# Patient Record
Sex: MALE | Race: WHITE | NOT HISPANIC OR LATINO | Employment: OTHER | URBAN - NONMETROPOLITAN AREA
[De-identification: names, ages, dates, MRNs, and addresses within clinical notes are randomized per-mention and may not be internally consistent; named-entity substitution may affect disease eponyms.]

---

## 2017-06-27 ENCOUNTER — AMBULATORY - GICH (OUTPATIENT)
Dept: SCHEDULING | Facility: OTHER | Age: 77
End: 2017-06-27

## 2017-12-14 ENCOUNTER — AMBULATORY - GICH (OUTPATIENT)
Dept: SCHEDULING | Facility: OTHER | Age: 77
End: 2017-12-14

## 2018-01-30 ENCOUNTER — DOCUMENTATION ONLY (OUTPATIENT)
Dept: FAMILY MEDICINE | Facility: OTHER | Age: 78
End: 2018-01-30

## 2018-01-30 RX ORDER — ASPIRIN 81 MG/1
81 TABLET, CHEWABLE ORAL DAILY
COMMUNITY
End: 2022-01-01

## 2018-01-30 RX ORDER — LOSARTAN POTASSIUM 100 MG/1
1 TABLET ORAL DAILY
COMMUNITY
Start: 2012-11-28 | End: 2020-03-30

## 2018-01-30 RX ORDER — INSULIN GLARGINE 100 [IU]/ML
45 INJECTION, SOLUTION SUBCUTANEOUS
COMMUNITY
Start: 2015-11-25 | End: 2020-06-04

## 2018-01-30 RX ORDER — SIMVASTATIN 20 MG
1 TABLET ORAL
COMMUNITY
Start: 2015-12-09 | End: 2020-07-30 | Stop reason: DRUGHIGH

## 2018-01-30 RX ORDER — GLIPIZIDE 10 MG/1
1 TABLET ORAL
COMMUNITY
Start: 2016-02-01 | End: 2020-03-30

## 2018-01-30 RX ORDER — DIPHENOXYLATE HYDROCHLORIDE AND ATROPINE SULFATE 2.5; .025 MG/1; MG/1
1 TABLET ORAL DAILY
COMMUNITY
Start: 2015-11-04 | End: 2021-01-01

## 2018-01-30 RX ORDER — ATENOLOL 50 MG/1
1 TABLET ORAL 2 TIMES DAILY
COMMUNITY
Start: 2012-11-28 | End: 2018-11-28

## 2018-01-30 RX ORDER — BENZONATATE 100 MG/1
1 CAPSULE ORAL 3 TIMES DAILY PRN
COMMUNITY
Start: 2016-06-14 | End: 2018-11-28

## 2018-08-19 ENCOUNTER — APPOINTMENT (OUTPATIENT)
Dept: CT IMAGING | Facility: OTHER | Age: 78
End: 2018-08-19
Attending: EMERGENCY MEDICINE
Payer: COMMERCIAL

## 2018-08-19 ENCOUNTER — HOSPITAL ENCOUNTER (EMERGENCY)
Facility: OTHER | Age: 78
Discharge: HOME | End: 2018-08-19
Attending: EMERGENCY MEDICINE | Admitting: EMERGENCY MEDICINE
Payer: COMMERCIAL

## 2018-08-19 VITALS
OXYGEN SATURATION: 98 % | DIASTOLIC BLOOD PRESSURE: 85 MMHG | RESPIRATION RATE: 16 BRPM | TEMPERATURE: 97.4 F | SYSTOLIC BLOOD PRESSURE: 153 MMHG

## 2018-08-19 DIAGNOSIS — R42 VERTIGO: ICD-10-CM

## 2018-08-19 DIAGNOSIS — R73.9 HYPERGLYCEMIA: ICD-10-CM

## 2018-08-19 LAB
ALBUMIN SERPL-MCNC: 3.9 G/DL (ref 3.5–5.7)
ALBUMIN UR-MCNC: NEGATIVE MG/DL
ALP SERPL-CCNC: 51 U/L (ref 34–104)
ALT SERPL W P-5'-P-CCNC: 8 U/L (ref 7–52)
ANION GAP SERPL CALCULATED.3IONS-SCNC: 6 MMOL/L (ref 3–14)
APPEARANCE UR: CLEAR
AST SERPL W P-5'-P-CCNC: 12 U/L (ref 13–39)
BASOPHILS # BLD AUTO: 0 10E9/L (ref 0–0.2)
BASOPHILS NFR BLD AUTO: 0.6 %
BILIRUB SERPL-MCNC: 0.6 MG/DL (ref 0.3–1)
BILIRUB UR QL STRIP: NEGATIVE
BUN SERPL-MCNC: 12 MG/DL (ref 7–25)
CALCIUM SERPL-MCNC: 9.1 MG/DL (ref 8.6–10.3)
CHLORIDE SERPL-SCNC: 101 MMOL/L (ref 98–107)
CO2 SERPL-SCNC: 27 MMOL/L (ref 21–31)
COLOR UR AUTO: YELLOW
CREAT SERPL-MCNC: 0.86 MG/DL (ref 0.7–1.3)
DIFFERENTIAL METHOD BLD: ABNORMAL
EOSINOPHIL # BLD AUTO: 0.3 10E9/L (ref 0–0.7)
EOSINOPHIL NFR BLD AUTO: 5.7 %
ERYTHROCYTE [DISTWIDTH] IN BLOOD BY AUTOMATED COUNT: 13.2 % (ref 10–15)
GFR SERPL CREATININE-BSD FRML MDRD: 86 ML/MIN/1.7M2
GLUCOSE SERPL-MCNC: 246 MG/DL (ref 70–105)
GLUCOSE UR STRIP-MCNC: >1000 MG/DL
HCT VFR BLD AUTO: 39.6 % (ref 40–53)
HGB BLD-MCNC: 13.8 G/DL (ref 13.3–17.7)
HGB UR QL STRIP: NEGATIVE
IMM GRANULOCYTES # BLD: 0 10E9/L (ref 0–0.4)
IMM GRANULOCYTES NFR BLD: 0.2 %
KETONES UR STRIP-MCNC: NEGATIVE MG/DL
LEUKOCYTE ESTERASE UR QL STRIP: NEGATIVE
LYMPHOCYTES # BLD AUTO: 1.4 10E9/L (ref 0.8–5.3)
LYMPHOCYTES NFR BLD AUTO: 26.2 %
MAGNESIUM SERPL-MCNC: 1.9 MG/DL (ref 1.9–2.7)
MCH RBC QN AUTO: 31.5 PG (ref 26.5–33)
MCHC RBC AUTO-ENTMCNC: 34.8 G/DL (ref 31.5–36.5)
MCV RBC AUTO: 90 FL (ref 78–100)
MONOCYTES # BLD AUTO: 0.4 10E9/L (ref 0–1.3)
MONOCYTES NFR BLD AUTO: 7.6 %
NEUTROPHILS # BLD AUTO: 3.1 10E9/L (ref 1.6–8.3)
NEUTROPHILS NFR BLD AUTO: 59.7 %
NITRATE UR QL: NEGATIVE
PH UR STRIP: 6 PH (ref 5–9)
PLATELET # BLD AUTO: 164 10E9/L (ref 150–450)
POTASSIUM SERPL-SCNC: 4.2 MMOL/L (ref 3.5–5.1)
PROT SERPL-MCNC: 6.2 G/DL (ref 6.4–8.9)
RBC # BLD AUTO: 4.38 10E12/L (ref 4.4–5.9)
RBC #/AREA URNS AUTO: NORMAL /HPF
SODIUM SERPL-SCNC: 134 MMOL/L (ref 134–144)
SOURCE: ABNORMAL
SP GR UR STRIP: 1.01 (ref 1–1.03)
UROBILINOGEN UR STRIP-ACNC: 0.2 EU/DL (ref 0.2–1)
WBC # BLD AUTO: 5.2 10E9/L (ref 4–11)
WBC #/AREA URNS AUTO: NORMAL /HPF

## 2018-08-19 PROCEDURE — 70450 CT HEAD/BRAIN W/O DYE: CPT

## 2018-08-19 PROCEDURE — 85025 COMPLETE CBC W/AUTO DIFF WBC: CPT | Performed by: EMERGENCY MEDICINE

## 2018-08-19 PROCEDURE — 96361 HYDRATE IV INFUSION ADD-ON: CPT | Performed by: EMERGENCY MEDICINE

## 2018-08-19 PROCEDURE — 81001 URINALYSIS AUTO W/SCOPE: CPT | Performed by: EMERGENCY MEDICINE

## 2018-08-19 PROCEDURE — 80053 COMPREHEN METABOLIC PANEL: CPT | Performed by: EMERGENCY MEDICINE

## 2018-08-19 PROCEDURE — 99284 EMERGENCY DEPT VISIT MOD MDM: CPT | Mod: Z6 | Performed by: EMERGENCY MEDICINE

## 2018-08-19 PROCEDURE — 96360 HYDRATION IV INFUSION INIT: CPT | Performed by: EMERGENCY MEDICINE

## 2018-08-19 PROCEDURE — 93010 ELECTROCARDIOGRAM REPORT: CPT | Performed by: INTERNAL MEDICINE

## 2018-08-19 PROCEDURE — 93005 ELECTROCARDIOGRAM TRACING: CPT | Performed by: EMERGENCY MEDICINE

## 2018-08-19 PROCEDURE — 83735 ASSAY OF MAGNESIUM: CPT | Performed by: EMERGENCY MEDICINE

## 2018-08-19 PROCEDURE — 36415 COLL VENOUS BLD VENIPUNCTURE: CPT | Performed by: EMERGENCY MEDICINE

## 2018-08-19 PROCEDURE — 99285 EMERGENCY DEPT VISIT HI MDM: CPT | Mod: 25 | Performed by: EMERGENCY MEDICINE

## 2018-08-19 PROCEDURE — 25000128 H RX IP 250 OP 636: Performed by: EMERGENCY MEDICINE

## 2018-08-19 RX ORDER — SODIUM CHLORIDE 9 MG/ML
100 INJECTION, SOLUTION INTRAVENOUS CONTINUOUS
Status: DISCONTINUED | OUTPATIENT
Start: 2018-08-19 | End: 2018-08-19 | Stop reason: HOSPADM

## 2018-08-19 RX ADMIN — SODIUM CHLORIDE 100 ML: 900 INJECTION, SOLUTION INTRAVENOUS at 12:53

## 2018-08-19 ASSESSMENT — ENCOUNTER SYMPTOMS
HEADACHES: 0
CONSTIPATION: 0
NAUSEA: 0
DYSURIA: 0
DIZZINESS: 1
SHORTNESS OF BREATH: 0
FACIAL ASYMMETRY: 0
ARTHRALGIAS: 0
SPEECH DIFFICULTY: 0
AGITATION: 0
SEIZURES: 0
DIARRHEA: 0
NUMBNESS: 0
CHILLS: 0
VOMITING: 0
TREMORS: 0
LIGHT-HEADEDNESS: 1
WEAKNESS: 0
CHEST TIGHTNESS: 0
FEVER: 0

## 2018-08-19 NOTE — ED PROVIDER NOTES
History     Chief Complaint   Patient presents with     Hyperglycemia     The history is provided by the patient.     Vikas Ji is a 78 year old male [place].  He said he felt fine this morning.  They went out to [employer] and he had some pancakes and syrup prior to going to the fair.  He took his oral diabetes medications and his insulin this morning about 6 AM.  When he was walking around at the fair perhaps at 11 AM or so he suddenly felt dizzy and like he was listing to the left.  He said he felt lites.  His chest felt light also.  He could hardly stand and had to sit.  He felt like the ground was tipping to the left like he was walking on the side of her roof.  Paramedics came to evaluate him got him in the ambulance took his vitals and his symptoms seem to have resolved at this time.  On seen the paramedics reported that he had a negative stroke scale.  His blood sugar however was elevated in the 270 range.  He has not been ill recently.    Problem List:    Patient Active Problem List    Diagnosis Date Noted     Dietary B12 deficiency 11/04/2015     Priority: Medium     Balance problems 10/06/2015     Priority: Medium     Generalized weakness 10/06/2015     Priority: Medium     Diabetes mellitus type 2, uncontrolled (H) 09/10/2015     Priority: Medium     History of tobacco use 09/10/2015     Priority: Medium     Low blood magnesium level 09/10/2015     Priority: Medium     Cough 11/28/2012     Priority: Medium     Diabetes mellitus without complication (H) 04/19/2011     Priority: Medium     Hypercholesterolemia 04/19/2011     Priority: Medium     Hypertension 04/19/2011     Priority: Medium     Inguinal hernia, right 04/19/2011     Priority: Medium     Diverticulosis of large intestine 10/18/2010     Priority: Medium     Personal history of other diseases of digestive system 10/18/2010     Priority: Medium        Past Medical History:    Past Medical History:    Diagnosis Date     Essential (primary) hypertension      Hypomagnesemia      Other abnormalities of gait and mobility      Pure hypercholesterolemia      Type 2 diabetes mellitus with hyperglycemia (H)        Past Surgical History:    Past Surgical History:   Procedure Laterality Date     COLONOSCOPY      2010,F/U 2020  Ulcerative colitis       Family History:    No family history on file.    Social History:  Marital Status:   [2]  Social History   Substance Use Topics     Smoking status: Former Smoker     Quit date: 1/1/1987     Smokeless tobacco: Current User     Types: Snuff     Alcohol use 3.6 oz/week      Comment: occasional        Medications:      aspirin 81 MG chewable tablet   atenolol (TENORMIN) 50 MG tablet   benzonatate (TESSALON) 100 MG capsule   calcium & magnesium carbonates 311-232 MG TABS   cyanocobalamin (RA VITAMIN B-12 TR) 1000 MCG TBCR   glipiZIDE (GLUCOTROL) 10 MG tablet   insulin glargine (LANTUS) 100 UNIT/ML injection   losartan (COZAAR) 100 MG tablet   metFORMIN (GLUCOPHAGE) 1000 MG tablet   Multiple Vitamin (MULTI-VITAMINS) TABS   omeprazole (PRILOSEC) 20 MG CR capsule   simvastatin (ZOCOR) 20 MG tablet   [DISCONTINUED] metFORMIN (GLUCOPHAGE) 1000 MG tablet         Review of Systems   Constitutional: Negative for chills and fever.   HENT: Negative for congestion.    Eyes: Negative for visual disturbance.   Respiratory: Negative for chest tightness and shortness of breath.    Cardiovascular: Negative for chest pain.   Gastrointestinal: Negative for constipation, diarrhea, nausea and vomiting.   Genitourinary: Negative for dysuria.   Musculoskeletal: Negative for arthralgias.   Skin: Negative for rash.   Neurological: Positive for dizziness and light-headedness. Negative for tremors, seizures, syncope, facial asymmetry, speech difficulty, weakness, numbness and headaches.   Psychiatric/Behavioral: Negative for agitation.       Physical Exam   BP: 148/75  Heart Rate: 60  Temp: 97.4  F  (36.3  C)  Resp: 16  SpO2: 98 %  Lying Orthostatic BP: 144/70  Lying Orthostatic Pulse: 59 bpm  Sitting Orthostatic BP: 134/70  Sitting Orthostatic Pulse: 60 bpm  Standing Orthostatic BP: 144/70  Standing Orthostatic Pulse: 62 bpm      Physical Exam   Constitutional: He is oriented to person, place, and time. He appears well-developed and well-nourished. No distress.   HENT:   Head: Normocephalic and atraumatic.   Left tympanic membrane is clear, right tympanic membrane has cerumen quite deep in the canal could be resting on tympanic membrane.  Cannot visualize tympanic membrane appear   Eyes: Conjunctivae are normal.   Neck: Neck supple.   Cardiovascular: Normal rate, regular rhythm and normal heart sounds.    Pulmonary/Chest: Effort normal and breath sounds normal. No respiratory distress.   Abdominal: Soft. Bowel sounds are normal.   Neurological: He is alert and oriented to person, place, and time. He has normal strength. He displays a negative Romberg sign. GCS eye subscore is 4. GCS verbal subscore is 5. GCS motor subscore is 6.   Cranial nerves II through XII intact.   Skin: Skin is warm and dry. He is not diaphoretic.   Psychiatric: He has a normal mood and affect. His behavior is normal.   Nursing note and vitals reviewed.      ED Course     ED Course     Procedures               EKG Interpretation:      Interpreted by Jeferson Mcgrath  Time reviewed: 1250  Symptoms at time of EKG: dizziness   Rhythm: normal sinus   Rate: 50-60  Axis: Normal  Ectopy: none  Conduction: normal  ST Segments/ T Waves: No ST-T wave changes  Q Waves: none  Comparison to prior: Unchanged    Clinical Impression: sinus bradycardia                     Results for orders placed or performed during the hospital encounter of 08/19/18 (from the past 24 hour(s))   CBC with platelets differential   Result Value Ref Range    WBC 5.2 4.0 - 11.0 10e9/L    RBC Count 4.38 (L) 4.4 - 5.9 10e12/L    Hemoglobin 13.8 13.3 - 17.7 g/dL    Hematocrit 39.6  (L) 40.0 - 53.0 %    MCV 90 78 - 100 fl    MCH 31.5 26.5 - 33.0 pg    MCHC 34.8 31.5 - 36.5 g/dL    RDW 13.2 10.0 - 15.0 %    Platelet Count 164 150 - 450 10e9/L    Diff Method Automated Method     % Neutrophils 59.7 %    % Lymphocytes 26.2 %    % Monocytes 7.6 %    % Eosinophils 5.7 %    % Basophils 0.6 %    % Immature Granulocytes 0.2 %    Absolute Neutrophil 3.1 1.6 - 8.3 10e9/L    Absolute Lymphocytes 1.4 0.8 - 5.3 10e9/L    Absolute Monocytes 0.4 0.0 - 1.3 10e9/L    Absolute Eosinophils 0.3 0.0 - 0.7 10e9/L    Absolute Basophils 0.0 0.0 - 0.2 10e9/L    Abs Immature Granulocytes 0.0 0 - 0.4 10e9/L   Comprehensive metabolic panel   Result Value Ref Range    Sodium 134 134 - 144 mmol/L    Potassium 4.2 3.5 - 5.1 mmol/L    Chloride 101 98 - 107 mmol/L    Carbon Dioxide 27 21 - 31 mmol/L    Anion Gap 6 3 - 14 mmol/L    Glucose 246 (H) 70 - 105 mg/dL    Urea Nitrogen 12 7 - 25 mg/dL    Creatinine 0.86 0.70 - 1.30 mg/dL    GFR Estimate 86 >60 mL/min/1.7m2    GFR Estimate If Black >90 >60 mL/min/1.7m2    Calcium 9.1 8.6 - 10.3 mg/dL    Bilirubin Total 0.6 0.3 - 1.0 mg/dL    Albumin 3.9 3.5 - 5.7 g/dL    Protein Total 6.2 (L) 6.4 - 8.9 g/dL    Alkaline Phosphatase 51 34 - 104 U/L    ALT 8 7 - 52 U/L    AST 12 (L) 13 - 39 U/L   Magnesium   Result Value Ref Range    Magnesium 1.9 1.9 - 2.7 mg/dL   *UA reflex to Microscopic   Result Value Ref Range    Color Urine Yellow     Appearance Urine Clear     Glucose Urine >1000 (A) NEG^Negative mg/dL    Bilirubin Urine Negative NEG^Negative    Ketones Urine Negative NEG^Negative mg/dL    Specific Gravity Urine 1.010 1.000 - 1.030    Blood Urine Negative NEG^Negative    pH Urine 6.0 5.0 - 9.0 pH    Protein Albumin Urine Negative NEG^Negative mg/dL    Urobilinogen Urine 0.2 0.2 - 1.0 EU/dL    Nitrite Urine Negative NEG^Negative    Leukocyte Esterase Urine Negative NEG^Negative    Source Midstream Urine    Urine Microscopic   Result Value Ref Range    WBC Urine 0 - 5 OTO5^0 - 5 /HPF     RBC Urine O - 2 OTO2^O - 2 /HPF   CT Head w/o Contrast    Narrative    PROCEDURE: CT HEAD W/O CONTRAST     HISTORY: vertigo; .    COMPARISON: None.    TECHNIQUE:  Helical images of the head from the foramen magnum to the  vertex were obtained without contrast.    FINDINGS: There is some widening of the cortical sulci consistent with  atrophy. There are no masses ventricular shifts or extracerebral  collections. The brainstem and cerebellum appear normal. No abnormal  fluid is seen in the middle ear cavity or mastoids.  Moderate mucosal  thickening is seen in the sphenoid sinuses.      Impression    IMPRESSION: Sphenoid sinusitis. Atrophy of the brain is noted.      NEDA TRENT MD       Medications   sodium chloride 0.9% infusion (0 mLs Intravenous Stopped 8/19/18 5153)       Assessments & Plan (with Medical Decision Making)     I have reviewed the nursing notes.    I have reviewed the findings, diagnosis, plan and need for follow up with the patient.  Patient symptoms have resolved.  With the exception of hyperglycemia which is worse than is normal for him, evaluation is unremarkable.  He does have some new hearing aids and is wondering if perhaps those could have caused this.  He also cleans his ears with a Q-tip and on his right ear he does have some cerumen which appears to be resting against his tympanic membrane and this could potentially be causing some symptoms, however I really have no definitive answer for why he was having his vertigo type symptoms.  He will be discharged home at this time, return if worse or follow-up in clinic if continues to have symptoms.    New Prescriptions    No medications on file       Final diagnoses:   Vertigo   Hyperglycemia       8/19/2018   [facility]     Jeferson Mcgrath MD  08/19/18 3304       Jeferson Mcgrath MD  08/19/18 3746

## 2018-08-19 NOTE — ED AVS SNAPSHOT
[facility]    [address]    [place] [address]    Phone:  [phone]    Fax:  [phone]                                       Vikas Ji   MRN: [id number]    Department:  [facility]   Date of Visit:  8/19/2018           After Visit Summary Signature Page     I have received my discharge instructions, and my questions have been answered. I have discussed any challenges I see with this plan with the nurse or doctor.    ..........................................................................................................................................  Patient/Patient Representative Signature      ..........................................................................................................................................  Patient Representative Print Name and Relationship to Patient    ..................................................               ................................................  Date                                            Time    ..........................................................................................................................................  Reviewed by Signature/Title    ...................................................              ..............................................  Date                                                            Time

## 2018-08-19 NOTE — DISCHARGE INSTRUCTIONS
Possible Causes of Dizziness or Fainting    Dizziness and fainting can have many causes. Below are some examples of possible causes your healthcare provider will look to rule out.  Benign paroxysmal positional vertigo (BPPV)  BPPV results when calcium crystals inside the inner ear shift into the wrong position. BPPV causes episodes of vertigo (a spinning sensation). Episodes most often happen when the head is moved in a certain way. This is more common in people 65 and older.   Infection or inflammation  The semicircular canals of the ear may become infected or inflamed. In this case, they can send the wrong balance signals. This can cause vertigo.  Meniere disease  Meniere disease happens when there is too much fluid in the semicircular canals. This can cause vertigo. It also can cause hearing problems and buzzing or ringing in the ears (called tinnitus). You may also have a feeling of pressure or fullness in the ear.  Syncope  Syncope is fainting that happens when the brain doesn t get enough oxygen-rich blood. It can be caused by low heart rate or low blood pressure. This is called vasovagal syncope. It can also be caused by sitting or standing up too quickly. This is called orthostatic hypotension. Syncope may also be due to a heart valve problem, an abnormal heart rhythm, or other heart problems. Dizziness can also happen from stroke, hemorrhage in the brain, or other problems in the brain. Your healthcare provider may do certain tests to rule out these conditions.  Other causes  Other causes include:    Medicines. Certain medicines can cause dizziness and even fainting. In some cases, stopping a medicine too quickly can lead to withdrawal symptoms, including dizziness and fainting.    Anxiety. Being anxious can lead to breathing changes, such as hyperventilation. These can lead to dizziness and fainting.  Additional causes for dizziness and fainting also exist. Talk to your healthcare provider for more  information.     Date Last Reviewed: 10/6/2015    2303-6202 [employer]. [address] [place] [address], [place] [address]. All rights reserved. This information is not intended as a substitute for professional medical care. Always follow your healthcare professional's instructions.

## 2018-08-19 NOTE — ED AVS SNAPSHOT
[facility]    [address]    [place] [address]    Phone:  [phone]    Fax:  [phone]                                       Vikas Ji   MRN: [id number]    Department:  [facility]   Date of Visit:  8/19/2018           Patient Information     Date Of Birth          1940        Your diagnoses for this visit were:     Vertigo        You were seen by Jeferson Mcgrath MD.        Discharge Instructions         Possible Causes of Dizziness or Fainting    Dizziness and fainting can have many causes. Below are some examples of possible causes your healthcare provider will look to rule out.  Benign paroxysmal positional vertigo (BPPV)  BPPV results when calcium crystals inside the inner ear shift into the wrong position. BPPV causes episodes of vertigo (a spinning sensation). Episodes most often happen when the head is moved in a certain way. This is more common in people 65 and older.   Infection or inflammation  The semicircular canals of the ear may become infected or inflamed. In this case, they can send the wrong balance signals. This can cause vertigo.  Meniere disease  Meniere disease happens when there is too much fluid in the semicircular canals. This can cause vertigo. It also can cause hearing problems and buzzing or ringing in the ears (called tinnitus). You may also have a feeling of pressure or fullness in the ear.  Syncope  Syncope is fainting that happens when the brain doesn t get enough oxygen-rich blood. It can be caused by low heart rate or low blood pressure. This is called vasovagal syncope. It can also be caused by sitting or standing up too quickly. This is called orthostatic hypotension. Syncope may also be due to a heart valve problem, an abnormal heart rhythm, or other heart problems. Dizziness can also happen from stroke, hemorrhage in the brain, or other problems in the brain. Your healthcare provider may do certain tests  to rule out these conditions.  Other causes  Other causes include:    Medicines. Certain medicines can cause dizziness and even fainting. In some cases, stopping a medicine too quickly can lead to withdrawal symptoms, including dizziness and fainting.    Anxiety. Being anxious can lead to breathing changes, such as hyperventilation. These can lead to dizziness and fainting.  Additional causes for dizziness and fainting also exist. Talk to your healthcare provider for more information.     Date Last Reviewed: 10/6/2015    0830-3278 [employer]. [address] [place] [address], [place] [address]. All rights reserved. This information is not intended as a substitute for professional medical care. Always follow your healthcare professional's instructions.          24 Hour Appointment Hotline     To schedule an appointment at [facility], please call [phone]. If you don't have a family doctor or clinic, we will help you find one. [facility] clinics are conveniently located to serve the needs of you and your family.           Review of your medicines      Our records show that you are taking the medicines listed below. If these are incorrect, please call your family doctor or clinic.        Dose / Directions Last dose taken    aspirin 81 MG chewable tablet   Dose:  81 mg        Take 81 mg by mouth once   Refills:  0        atenolol 50 MG tablet   Commonly known as:  TENORMIN   Dose:  1 tablet        Take 1 tablet by mouth 2 times daily   Refills:  0        benzonatate 100 MG capsule   Commonly known as:  TESSALON   Dose:  1 capsule        Take 1 capsule by mouth 3 times daily as needed for cough   Refills:  0        calcium & magnesium carbonates 311-232 MG Tabs   Dose:  3 tablet        Take 3 tablets by mouth daily   Refills:  0        glipiZIDE 10 MG tablet   Commonly known as:  GLUCOTROL   Dose:  1 tablet        Take 1 tablet by mouth 2 times daily (before meals) In AM and PM -- and add 1/2 tablet daily with  Lunch.   Refills:  0        insulin glargine 100 UNIT/ML injection   Commonly known as:  LANTUS   Dose:  45 Units        Inject 45 Units Subcutaneous every morning (before breakfast)   Refills:  0        losartan 100 MG tablet   Commonly known as:  COZAAR   Dose:  1 tablet        Take 1 tablet by mouth daily   Refills:  0        metFORMIN 1000 MG tablet   Commonly known as:  GLUCOPHAGE   Dose:  1 tablet        Take 1 tablet by mouth 2 times daily (with meals)   Refills:  0        MULTI-VITAMINS Tabs   Dose:  2 tablet        Take 2 tablets by mouth daily   Refills:  0        omeprazole 20 MG CR capsule   Commonly known as:  priLOSEC   Dose:  1 capsule        Take 1 capsule by mouth daily   Refills:  0        RA VITAMIN B-12 TR 1000 MCG Tbcr   Dose:  1 tablet   Generic drug:  cyanocobalamin        Take 1 tablet by mouth daily   Refills:  0        simvastatin 20 MG tablet   Commonly known as:  ZOCOR   Dose:  1 tablet        Take 1 tablet by mouth daily (with dinner) Patient started taking 20 mg 11/25/15.   Refills:  0                Procedures and tests performed during your visit     *UA reflex to Microscopic    CBC with platelets differential    CT Head w/o Contrast    Comprehensive metabolic panel    EKG 12-lead, tracing only    Magnesium    Urine Microscopic      Orders Needing Specimen Collection     None      Pending Results     No orders found from 8/17/2018 to 8/20/2018.            Pending Culture Results     No orders found from 8/17/2018 to 8/20/2018.            Pending Results Instructions     If you had any lab results that were not finalized at the time of your Discharge, you can call the ED Lab Result RN at [phone]. You will be contacted by this team for any positive Lab results or changes in treatment. The nurses are available 7 days a week from 10A to 6:30P.  You can leave a message 24 hours per day and they will return your call.        Thank you for choosing [facility]       Thank you for choosing  "[facility] for your care. Our goal is always to provide you with excellent care. Hearing back from our patients is one way we can continue to improve our services. Please take a few minutes to complete the written survey that you may receive in the mail after you visit with us. Thank you!        [employer] Information     [employer] lets you send messages to your doctor, view your test results, renew your prescriptions, schedule appointments and more. To sign up, go to [email or web address] . Click on \"Log in\" on the left side of the screen, which will take you to the Welcome page. Then click on \"Sign up Now\" on the right side of the page.     You will be asked to enter the access code listed below, as well as some personal information. Please follow the directions to create your username and password.     Your access code is: [email or web address]  Expires: 2018  3:00 PM     Your access code will  in 90 days. If you need help or a new code, please call your [facility] clinic or [phone].        Care EveryWhere ID     This is your Care EveryWhere ID. This could be used by other organizations to access your [facility] medical records  [id number]        Equal Access to Services     VASQUEZ CASTRO : Ela Sorto, david salinas, qasimona valdez, taran johnson. So [facility] [phone].    ATENCIÓN: Si habla español, tiene a gonzalez disposición servicios gratuitos de asistencia lingüística. Llame al [phone].    We comply with applicable federal civil rights laws and [place] laws. We do not discriminate on the basis of race, color, national origin, age, disability, sex, sexual orientation, or gender identity.            After Visit Summary       This is your record. Keep this with you and show to your community pharmacist(s) and doctor(s) at your next visit.                  "

## 2018-08-19 NOTE — ED NOTES
"Took pt on a \"road test\" and the pt reports not having any dizziness.  Pt's gait was not impaired.  "

## 2018-08-19 NOTE — ED TRIAGE NOTES
"[place]  Pt had a sudden onset of dizziness and left sided weakness.  Pt states he does have a hx of a CVA.  Pt states that he has new hearing aides and wondering if that may have got him off balance.  Pt states that he was walking and felt like he was leaning to the left and had never felt like this before, almost like he was \"drunk\" the pt states.  Pt states that he doesn't have any dizziness, just a mild headache across his eyes.  "

## 2018-11-28 ENCOUNTER — OFFICE VISIT (OUTPATIENT)
Dept: INTERNAL MEDICINE | Facility: OTHER | Age: 78
End: 2018-11-28
Attending: INTERNAL MEDICINE
Payer: COMMERCIAL

## 2018-11-28 VITALS
HEART RATE: 72 BPM | DIASTOLIC BLOOD PRESSURE: 74 MMHG | SYSTOLIC BLOOD PRESSURE: 138 MMHG | BODY MASS INDEX: 26.6 KG/M2 | WEIGHT: 180.13 LBS

## 2018-11-28 DIAGNOSIS — I10 BENIGN ESSENTIAL HYPERTENSION: Primary | ICD-10-CM

## 2018-11-28 DIAGNOSIS — E11.9 CONTROLLED TYPE 2 DIABETES MELLITUS WITHOUT COMPLICATION, WITH LONG-TERM CURRENT USE OF INSULIN (H): ICD-10-CM

## 2018-11-28 DIAGNOSIS — Z79.4 CONTROLLED TYPE 2 DIABETES MELLITUS WITHOUT COMPLICATION, WITH LONG-TERM CURRENT USE OF INSULIN (H): ICD-10-CM

## 2018-11-28 DIAGNOSIS — E78.2 MIXED HYPERLIPIDEMIA: ICD-10-CM

## 2018-11-28 LAB
ALBUMIN SERPL-MCNC: 4.5 G/DL (ref 3.5–5.7)
ALP SERPL-CCNC: 51 U/L (ref 34–104)
ALT SERPL W P-5'-P-CCNC: 9 U/L (ref 7–52)
ANION GAP SERPL CALCULATED.3IONS-SCNC: 6 MMOL/L (ref 3–14)
AST SERPL W P-5'-P-CCNC: 13 U/L (ref 13–39)
BILIRUB SERPL-MCNC: 0.9 MG/DL (ref 0.3–1)
BUN SERPL-MCNC: 13 MG/DL (ref 7–25)
CALCIUM SERPL-MCNC: 9.4 MG/DL (ref 8.6–10.3)
CHLORIDE SERPL-SCNC: 101 MMOL/L (ref 98–107)
CHOLEST SERPL-MCNC: 113 MG/DL
CO2 SERPL-SCNC: 29 MMOL/L (ref 21–31)
CREAT SERPL-MCNC: 0.79 MG/DL (ref 0.7–1.3)
ERYTHROCYTE [DISTWIDTH] IN BLOOD BY AUTOMATED COUNT: 12.4 % (ref 10–15)
GFR SERPL CREATININE-BSD FRML MDRD: >90 ML/MIN/1.7M2
GLUCOSE SERPL-MCNC: 127 MG/DL (ref 70–105)
HBA1C MFR BLD: 6.7 % (ref 4–6)
HCT VFR BLD AUTO: 41.4 % (ref 40–53)
HDLC SERPL-MCNC: 37 MG/DL (ref 23–92)
HGB BLD-MCNC: 14.6 G/DL (ref 13.3–17.7)
LDLC SERPL CALC-MCNC: 56 MG/DL
MCH RBC QN AUTO: 31.7 PG (ref 26.5–33)
MCHC RBC AUTO-ENTMCNC: 35.3 G/DL (ref 31.5–36.5)
MCV RBC AUTO: 90 FL (ref 78–100)
NONHDLC SERPL-MCNC: 76 MG/DL
PLATELET # BLD AUTO: 172 10E9/L (ref 150–450)
POTASSIUM SERPL-SCNC: 4.4 MMOL/L (ref 3.5–5.1)
PROT SERPL-MCNC: 7 G/DL (ref 6.4–8.9)
RBC # BLD AUTO: 4.6 10E12/L (ref 4.4–5.9)
SODIUM SERPL-SCNC: 136 MMOL/L (ref 134–144)
TRIGL SERPL-MCNC: 102 MG/DL
WBC # BLD AUTO: 7.1 10E9/L (ref 4–11)

## 2018-11-28 PROCEDURE — 80061 LIPID PANEL: CPT | Performed by: INTERNAL MEDICINE

## 2018-11-28 PROCEDURE — 80053 COMPREHEN METABOLIC PANEL: CPT | Performed by: INTERNAL MEDICINE

## 2018-11-28 PROCEDURE — 99214 OFFICE O/P EST MOD 30 MIN: CPT | Performed by: INTERNAL MEDICINE

## 2018-11-28 PROCEDURE — 36415 COLL VENOUS BLD VENIPUNCTURE: CPT | Performed by: INTERNAL MEDICINE

## 2018-11-28 PROCEDURE — 83036 HEMOGLOBIN GLYCOSYLATED A1C: CPT | Performed by: INTERNAL MEDICINE

## 2018-11-28 PROCEDURE — 85027 COMPLETE CBC AUTOMATED: CPT | Performed by: INTERNAL MEDICINE

## 2018-11-28 RX ORDER — ATENOLOL 50 MG/1
50 TABLET ORAL 2 TIMES DAILY
Qty: 180 TABLET | Refills: 3 | COMMUNITY
Start: 2018-11-28 | End: 2019-09-18

## 2018-11-28 ASSESSMENT — PAIN SCALES - GENERAL: PAINLEVEL: NO PAIN (0)

## 2018-11-28 ASSESSMENT — ENCOUNTER SYMPTOMS
CONFUSION: 0
HEMATURIA: 0
ABDOMINAL PAIN: 0
ARTHRALGIAS: 0
BRUISES/BLEEDS EASILY: 0
MYALGIAS: 0
VOMITING: 0
PALPITATIONS: 0
EYE PAIN: 0
SHORTNESS OF BREATH: 0
FEVER: 0
COUGH: 0
DIZZINESS: 0
WHEEZING: 0
CHILLS: 0
AGITATION: 0
FATIGUE: 0
LIGHT-HEADEDNESS: 0
DIARRHEA: 0
DYSURIA: 0
NAUSEA: 0

## 2018-11-28 ASSESSMENT — ANXIETY QUESTIONNAIRES
3. WORRYING TOO MUCH ABOUT DIFFERENT THINGS: NOT AT ALL
7. FEELING AFRAID AS IF SOMETHING AWFUL MIGHT HAPPEN: NOT AT ALL
2. NOT BEING ABLE TO STOP OR CONTROL WORRYING: NOT AT ALL
IF YOU CHECKED OFF ANY PROBLEMS ON THIS QUESTIONNAIRE, HOW DIFFICULT HAVE THESE PROBLEMS MADE IT FOR YOU TO DO YOUR WORK, TAKE CARE OF THINGS AT HOME, OR GET ALONG WITH OTHER PEOPLE: NOT DIFFICULT AT ALL
5. BEING SO RESTLESS THAT IT IS HARD TO SIT STILL: NOT AT ALL
GAD7 TOTAL SCORE: 0
6. BECOMING EASILY ANNOYED OR IRRITABLE: NOT AT ALL
1. FEELING NERVOUS, ANXIOUS, OR ON EDGE: NOT AT ALL

## 2018-11-28 ASSESSMENT — PATIENT HEALTH QUESTIONNAIRE - PHQ9
5. POOR APPETITE OR OVEREATING: NOT AT ALL
SUM OF ALL RESPONSES TO PHQ QUESTIONS 1-9: 0

## 2018-11-28 NOTE — PROGRESS NOTES
"Nursing Notes:   Yesenia Trammell LPN  11/28/2018 10:46 AM  Signed  Patient presents to the clinic for discussion of current medications.      Previous A1C is at goal of <8  No results found for: A1C  Urine microalbumin:creatine: n/a  Foot exam over a year ago-today  Eye exam Spring 2018    Tobacco User no  Patient is on a daily aspirin  Patient is on a Statin.  Blood pressure today of:     BP Readings from Last 1 Encounters:   08/19/18 153/85      is not at the goal of <139/89 for diabetics.    Chief Complaint   Patient presents with     Recheck Medication       Initial There were no vitals taken for this visit. Estimated body mass index is 28.8 kg/(m^2) as calculated from the following:    Height as of 3/22/16: 5' 9\" (1.753 m).    Weight as of 6/14/16: 195 lb (88.5 kg).  Medication Reconciliation: complete    Yesenia Trammell LPN        Nursing note reviewed with patient.  Accurracy and completeness verified.   Mr. Ji is a 78 year old male who:  Patient presents with:  Recheck Medication      ICD-10-CM    1. Benign essential hypertension I10 atenolol (TENORMIN) 50 MG tablet   2. Mixed hyperlipidemia E78.2 Lipid Profile     Lipid Profile   3. Controlled type 2 diabetes mellitus without complication, with long-term current use of insulin (H) E11.9 Albumin Random Urine Quantitative with Creat Ratio    Z79.4 CBC with platelets     Comprehensive metabolic panel     *UA reflex to Microscopic     Hemoglobin A1c     CBC with platelets     Comprehensive metabolic panel     Hemoglobin A1c     HPI  Patient presents with numerous medication questions.    Hypertension, currently well controlled.  Taking atenolol 50 mg twice daily.  [facility] 100 mg twice daily.  He thought that was way too much medication.  He is doing well 50 mg twice daily advised to continue.  Med list updated.  Refill sent to pharmacy.    Hyperlipidemia, currently on simvastatin.  Seems to be tolerating " well.  Check labs.    Diabetes, previously uncontrolled, now controlled.  Labs ordered.  Med list updated.    To help with weight loss and improve blood sugar control....    -- Try to avoid Carbohydrates as much as possible -- breads, pasta, baked goods, cakes, oatmeal, cold cereal, potatoes.   These are turned to sugar in one metabolic conversion, cause insulin secretion and increased fat deposition / weight gain.      -- Eat more lean meats, proteins, eggs, nuts, vegetables.      Functional Capacity: about 4 METS.   No orthopnea/paroxysmal nocturnal dyspnea  Review of Systems   Constitutional: Negative for chills, fatigue and fever.   HENT: Negative for congestion and hearing loss.    Eyes: Negative for pain and visual disturbance.   Respiratory: Negative for cough, shortness of breath and wheezing.    Cardiovascular: Negative for chest pain and palpitations.   Gastrointestinal: Negative for abdominal pain, diarrhea, nausea and vomiting.   Endocrine: Negative for cold intolerance and heat intolerance.   Genitourinary: Negative for dysuria and hematuria.   Musculoskeletal: Negative for arthralgias and myalgias.   Skin: Negative for pallor.   Allergic/Immunologic: Negative for immunocompromised state.   Neurological: Negative for dizziness and light-headedness.   Hematological: Does not bruise/bleed easily.   Psychiatric/Behavioral: Negative for agitation and confusion.      LEYDA:   LEYDA-7 SCORE 11/28/2018   Total Score 0     PHQ9:  PHQ-9 SCORE 9/10/2015 10/6/2015 11/28/2018   PHQ-9 Total Score 4 1 0       I have personally reviewed the past medical history, past surgical history, medications, allergies, family and social history as listed below, on 11/28/2018.    No Known Allergies    Current Outpatient Prescriptions   Medication Sig Dispense Refill     aspirin 81 MG chewable tablet Take 81 mg by mouth once       atenolol (TENORMIN) 50 MG tablet Take 1 tablet (50 mg) by mouth 2 times daily - dose clarification  11/28/2018 180 tablet 3     calcium & magnesium carbonates 311-232 MG TABS Take 3 tablets by mouth daily       cyanocobalamin (RA VITAMIN B-12 TR) 1000 MCG TBCR Take 1 tablet by mouth daily       glipiZIDE (GLUCOTROL) 10 MG tablet Take 1 tablet by mouth 2 times daily (before meals) In AM and PM -- and add 1/2 tablet daily with Lunch.       insulin glargine (LANTUS) 100 UNIT/ML injection Inject 45 Units Subcutaneous every morning (before breakfast)       losartan (COZAAR) 100 MG tablet Take 1 tablet by mouth daily       metFORMIN (GLUCOPHAGE) 1000 MG tablet Take 1 tablet by mouth 2 times daily (with meals)       Multiple Vitamin (MULTI-VITAMINS) TABS Take 2 tablets by mouth daily       omeprazole (PRILOSEC) 20 MG CR capsule Take 1 capsule by mouth daily       simvastatin (ZOCOR) 20 MG tablet Take 1 tablet by mouth daily (with dinner) Patient started taking 20 mg 11/25/15.          Patient Active Problem List    Diagnosis Date Noted     Benign essential hypertension 11/28/2018     Priority: Medium     Mixed hyperlipidemia 11/28/2018     Priority: Medium     Dietary B12 deficiency 11/04/2015     Priority: Medium     Balance problems 10/06/2015     Priority: Medium     Generalized weakness 10/06/2015     Priority: Medium     Controlled type 2 diabetes mellitus without complication, with long-term current use of insulin (H) 09/10/2015     Priority: Medium     History of tobacco use 09/10/2015     Priority: Medium     Low blood magnesium level 09/10/2015     Priority: Medium     Cough 11/28/2012     Priority: Medium     Inguinal hernia, right 04/19/2011     Priority: Medium     Diverticulosis of large intestine 10/18/2010     Priority: Medium     Personal history of other diseases of digestive system 10/18/2010     Priority: Medium     Past Medical History:   Diagnosis Date     Essential (primary) hypertension     4/19/2011     Hypomagnesemia     9/10/2015     Other abnormalities of gait and mobility     10/6/2015      "Pure hypercholesterolemia     4/19/2011     Type 2 diabetes mellitus with hyperglycemia (H)     9/10/2015     Past Surgical History:   Procedure Laterality Date     COLONOSCOPY      2010,F/U 2020  Ulcerative colitis     Social History     Social History     Marital status:      Spouse name: N/A     Number of children: N/A     Years of education: N/A     Social History Main Topics     Smoking status: Former Smoker     Quit date: 1/1/1987     Smokeless tobacco: Current User     Types: Snuff     Alcohol use 3.6 oz/week     Drug use: No     Sexual activity: No     Other Topics Concern     None     Social History Narrative    , lives with his wife, remains active     History reviewed. No pertinent family history.    EXAM:   Vitals:    11/28/18 1041   BP: 138/74   BP Location: Right arm   Patient Position: Sitting   Cuff Size: Adult Regular   Pulse: 72   Weight: 180 lb 2 oz (81.7 kg)       Current Pain Score: No Pain (0)     BP Readings from Last 3 Encounters:   11/28/18 138/74   08/19/18 153/85   06/14/16 (!) 152/92      Wt Readings from Last 3 Encounters:   11/28/18 180 lb 2 oz (81.7 kg)   06/14/16 195 lb (88.5 kg)   03/22/16 202 lb (91.6 kg)      Estimated body mass index is 26.6 kg/(m^2) as calculated from the following:    Height as of 3/22/16: 5' 9\" (1.753 m).    Weight as of this encounter: 180 lb 2 oz (81.7 kg).     Physical Exam   Constitutional: He appears well-developed and well-nourished. No distress.   HENT:   Head: Normocephalic and atraumatic.   Eyes: Conjunctivae and EOM are normal. No scleral icterus.   Neck: No thyromegaly present.   Cardiovascular: Normal rate and regular rhythm.    Pulmonary/Chest: Effort normal. No respiratory distress. He has no wheezes.   Abdominal: Soft. There is no tenderness.   Musculoskeletal: He exhibits no tenderness or deformity.   Lymphadenopathy:     He has no cervical adenopathy.   Neurological: He is alert. No cranial nerve deficit.   Skin: Skin is warm and " dry.   Psychiatric: He has a normal mood and affect.      Procedures   INVESTIGATIONS:  Results for orders placed or performed in visit on 11/28/18   CBC with platelets   Result Value Ref Range    WBC 7.1 4.0 - 11.0 10e9/L    RBC Count 4.60 4.4 - 5.9 10e12/L    Hemoglobin 14.6 13.3 - 17.7 g/dL    Hematocrit 41.4 40.0 - 53.0 %    MCV 90 78 - 100 fl    MCH 31.7 26.5 - 33.0 pg    MCHC 35.3 31.5 - 36.5 g/dL    RDW 12.4 10.0 - 15.0 %    Platelet Count 172 150 - 450 10e9/L   Comprehensive metabolic panel   Result Value Ref Range    Sodium 136 134 - 144 mmol/L    Potassium 4.4 3.5 - 5.1 mmol/L    Chloride 101 98 - 107 mmol/L    Carbon Dioxide 29 21 - 31 mmol/L    Anion Gap 6 3 - 14 mmol/L    Glucose 127 (H) 70 - 105 mg/dL    Urea Nitrogen 13 7 - 25 mg/dL    Creatinine 0.79 0.70 - 1.30 mg/dL    GFR Estimate >90 >60 mL/min/1.7m2    GFR Estimate If Black >90 >60 mL/min/1.7m2    Calcium 9.4 8.6 - 10.3 mg/dL    Bilirubin Total 0.9 0.3 - 1.0 mg/dL    Albumin 4.5 3.5 - 5.7 g/dL    Protein Total 7.0 6.4 - 8.9 g/dL    Alkaline Phosphatase 51 34 - 104 U/L    ALT 9 7 - 52 U/L    AST 13 13 - 39 U/L   Lipid Profile   Result Value Ref Range    Cholesterol 113 <200 mg/dL    Triglycerides 102 <150 mg/dL    HDL Cholesterol 37 23 - 92 mg/dL    LDL Cholesterol Calculated 56 <100 mg/dL    Non HDL Cholesterol 76 <130 mg/dL   Hemoglobin A1c   Result Value Ref Range    Hemoglobin A1C 6.7 (H) 4.0 - 6.0 %       ASSESSMENT AND PLAN:  Problem List Items Addressed This Visit        Endocrine    Controlled type 2 diabetes mellitus without complication, with long-term current use of insulin (H)    Relevant Orders    Albumin Random Urine Quantitative with Creat Ratio    CBC with platelets    Comprehensive metabolic panel    *UA reflex to Microscopic    Hemoglobin A1c    Mixed hyperlipidemia    Relevant Orders    Lipid Profile       Circulatory    Benign essential hypertension - Primary    Relevant Medications    atenolol (TENORMIN) 50 MG tablet         reviewed diet, exercise and weight control, recommended sodium restriction  -- Expected clinical course discussed    -- Medications and their side effects discussed    Patient Instructions   Medication list updated.     -- Continue Atenolol 50 mg -- twice daily.     Labs today.     Restart taking your Glipizide diabetes meal tablets.    [facility] [place] [facility]     Return as needed for follow-up with Dr. Salter.    Clinic : [phone]  Appointment line: [phone]      Ace Salter MD  Internal Medicine  [facility]     Portions of this note were dictated using speech recognition software. The note has been proofread but errors in the text may have been overlooked. Please contact me if there are any concerns regarding the accuracy of the dictation.

## 2018-11-28 NOTE — NURSING NOTE
"Patient presents to the clinic for discussion of current medications.      Previous A1C is at goal of <8  No results found for: A1C  Urine microalbumin:creatine: n/a  Foot exam over a year ago-today  Eye exam Spring 2018    Tobacco User no  Patient is on a daily aspirin  Patient is on a Statin.  Blood pressure today of:     BP Readings from Last 1 Encounters:   08/19/18 153/85      is not at the goal of <139/89 for diabetics.    Chief Complaint   Patient presents with     Recheck Medication       Initial There were no vitals taken for this visit. Estimated body mass index is 28.8 kg/(m^2) as calculated from the following:    Height as of 3/22/16: 5' 9\" (1.753 m).    Weight as of 6/14/16: 195 lb (88.5 kg).  Medication Reconciliation: complete    Yesenia Trammell LPN        "

## 2018-11-28 NOTE — LETTER
December 3, 2018    Vikas Molinalamarvan  [address] [address] [address]      Dear Vikas Ji,    The result of your recent tests are included below:    Hemoglobin A1c shows well-controlled type 2 diabetes.    Blood counts are normal.    Kidney function/creatinine is normal.    Cholesterol levels look good.    Results for orders placed or performed in visit on 11/28/18   CBC with platelets   Result Value Ref Range    WBC 7.1 4.0 - 11.0 10e9/L    RBC Count 4.60 4.4 - 5.9 10e12/L    Hemoglobin 14.6 13.3 - 17.7 g/dL    Hematocrit 41.4 40.0 - 53.0 %    MCV 90 78 - 100 fl    MCH 31.7 26.5 - 33.0 pg    MCHC 35.3 31.5 - 36.5 g/dL    RDW 12.4 10.0 - 15.0 %    Platelet Count 172 150 - 450 10e9/L   Comprehensive metabolic panel   Result Value Ref Range    Sodium 136 134 - 144 mmol/L    Potassium 4.4 3.5 - 5.1 mmol/L    Chloride 101 98 - 107 mmol/L    Carbon Dioxide 29 21 - 31 mmol/L    Anion Gap 6 3 - 14 mmol/L    Glucose 127 (H) 70 - 105 mg/dL    Urea Nitrogen 13 7 - 25 mg/dL    Creatinine 0.79 0.70 - 1.30 mg/dL    GFR Estimate >90 >60 mL/min/1.7m2    GFR Estimate If Black >90 >60 mL/min/1.7m2    Calcium 9.4 8.6 - 10.3 mg/dL    Bilirubin Total 0.9 0.3 - 1.0 mg/dL    Albumin 4.5 3.5 - 5.7 g/dL    Protein Total 7.0 6.4 - 8.9 g/dL    Alkaline Phosphatase 51 34 - 104 U/L    ALT 9 7 - 52 U/L    AST 13 13 - 39 U/L   Lipid Profile   Result Value Ref Range    Cholesterol 113 <200 mg/dL    Triglycerides 102 <150 mg/dL    HDL Cholesterol 37 23 - 92 mg/dL    LDL Cholesterol Calculated 56 <100 mg/dL    Non HDL Cholesterol 76 <130 mg/dL   Hemoglobin A1c   Result Value Ref Range    Hemoglobin A1C 6.7 (H) 4.0 - 6.0 %       If you have any further questions or problems, please contact my office at [phone] and schedule an appointment.    Clinic : [phone]  Appointment line: [phone]     Thank you,    Ace Salter MD    Internal Medicine  [facility]     Reviewed and electronically  signed by provider.

## 2018-11-28 NOTE — PATIENT INSTRUCTIONS
Medication list updated.     -- Continue Atenolol 50 mg -- twice daily.     Labs today.     Restart taking your Glipizide diabetes meal tablets.    -- [facility] [place] [facility].     Return as needed for follow-up with Dr. Salter.    Clinic : [phone]  Appointment line: [phone]

## 2018-11-28 NOTE — MR AVS SNAPSHOT
After Visit Summary   11/28/2018    Vikas Ji    MRN: [id number]           Patient Information     Date Of Birth          1940        Visit Information        Provider Department      11/28/2018 10:20 AM Ace Salter MD [facility]        Today's Diagnoses     Benign essential hypertension    -  1    Mixed hyperlipidemia        Uncontrolled type 2 diabetes mellitus without complication, with long-term current use of insulin (H)          Care Instructions    Medication list updated.     -- Continue Atenolol 50 mg -- twice daily.     Labs today.     Restart taking your Glipizide diabetes meal tablets.    -- [facility] [place] [facility]     Return as needed for follow-up with Dr. Salter.    Clinic : [phone]  Appointment line: [phone]            Follow-ups after your visit        Follow-up notes from your care team     Return if symptoms worsen or fail to improve.      Future tests that were ordered for you today     Open Standing Orders        Priority Remaining Interval Expires Ordered    Albumin Random Urine Quantitative with Creat Ratio Routine 4/4 Every 3 Months 11/28/2019 11/28/2018    CBC with platelets Routine 4/4 Every 3 Months 11/28/2019 11/28/2018    Comprehensive metabolic panel Routine 4/4 Every 3 Months 11/28/2019 11/28/2018    *UA reflex to Microscopic Routine 4/4 Every 3 Months 11/28/2019 11/28/2018    Lipid Profile Routine 4/4 Every 3 Months 11/28/2019 11/28/2018    Hemoglobin A1c Routine 4/4 Every 3 Months 11/28/2019 11/28/2018            Who to contact     If you have questions or need follow up information about today's clinic visit or your schedule please contact [facility] directly at [phone].  Normal or non-critical lab and imaging results will be communicated to you by MyChart, letter or phone within 4 business days after the clinic has received the results. If you do not hear from us  within 7 days, please contact the clinic through [employer] or phone. If you have a critical or abnormal lab result, we will notify you by phone as soon as possible.  Submit refill requests through [employer] or call your pharmacy and they will forward the refill request to us. Please allow 3 business days for your refill to be completed.          Additional Information About Your Visit        Care EveryWhere ID     This is your Care EveryWhere ID. This could be used by other organizations to access your [place] medical records  [id number]        Your Vitals Were     Pulse BMI (Body Mass Index)                72 26.6 kg/m2           Blood Pressure from Last 3 Encounters:   11/28/18 138/74   08/19/18 153/85   06/14/16 (!) 152/92    Weight from Last 3 Encounters:   11/28/18 180 lb 2 oz (81.7 kg)   06/14/16 195 lb (88.5 kg)   03/22/16 202 lb (91.6 kg)                 Today's Medication Changes          These changes are accurate as of 11/28/18 10:57 AM.  If you have any questions, ask your nurse or doctor.               These medicines have changed or have updated prescriptions.        Dose/Directions    atenolol 50 MG tablet   Commonly known as:  TENORMIN   This may have changed:  additional instructions   Used for:  Benign essential hypertension   Changed by:  Ace Salter MD        Dose:  50 mg   Take 1 tablet (50 mg) by mouth 2 times daily - dose clarification 11/28/2018   Quantity:  180 tablet   Refills:  3                Primary Care Provider Office Phone # Fax #    Ace Salter [phone]       [address] [facility] [address]        Equal Access to Services     [facility]: Hadii poornima garcia Sofranck, waaxda luqadaha, qaybta kaalmataran rojas . So [facility] [phone].    ATENCIÓN: Si habla español, tiene a gonzalez disposición servicios gratuitos de asistencia lingüística. Llame al [phone].    We comply with applicable federal civil  rights laws and [place] laws. We do not discriminate on the basis of race, color, national origin, age, disability, sex, sexual orientation, or gender identity.            Thank you!     Thank you for choosing [facility] AND [facility]  for your care. Our goal is always to provide you with excellent care. Hearing back from our patients is one way we can continue to improve our services. Please take a few minutes to complete the written survey that you may receive in the mail after your visit with us. Thank you!             Your Updated Medication List - Protect others around you: Learn how to safely use, store and throw away your medicines at www.disposemymeds.org.          This list is accurate as of 11/28/18 10:57 AM.  Always use your most recent med list.                   Brand Name Dispense Instructions for use Diagnosis    aspirin 81 MG chewable tablet    ASA     Take 81 mg by mouth once        atenolol 50 MG tablet    TENORMIN    180 tablet    Take 1 tablet (50 mg) by mouth 2 times daily - dose clarification 11/28/2018    Benign essential hypertension       calcium & magnesium carbonates 311-232 MG tablet      Take 3 tablets by mouth daily        glipiZIDE 10 MG tablet    GLUCOTROL     Take 1 tablet by mouth 2 times daily (before meals) In AM and PM -- and add 1/2 tablet daily with Lunch.        insulin glargine 100 UNIT/ML vial    LANTUS VIAL     Inject 45 Units Subcutaneous every morning (before breakfast)        losartan 100 MG tablet    COZAAR     Take 1 tablet by mouth daily        metFORMIN 1000 MG tablet    GLUCOPHAGE     Take 1 tablet by mouth 2 times daily (with meals)        MULTI-VITAMINS Tabs      Take 2 tablets by mouth daily        omeprazole 20 MG DR capsule    priLOSEC     Take 1 capsule by mouth daily        RA VITAMIN B-12 TR 1000 MCG CR tablet   Generic drug:  vitamin B-12      Take 1 tablet by mouth daily        simvastatin 20 MG tablet    ZOCOR     Take 1 tablet by mouth daily  (with dinner) Patient started taking 20 mg 11/25/15.

## 2018-11-29 ASSESSMENT — ANXIETY QUESTIONNAIRES: GAD7 TOTAL SCORE: 0

## 2019-01-09 ENCOUNTER — TRANSFERRED RECORDS (OUTPATIENT)
Dept: HEALTH INFORMATION MANAGEMENT | Facility: OTHER | Age: 79
End: 2019-01-09

## 2019-04-04 ENCOUNTER — THERAPY VISIT (OUTPATIENT)
Dept: CHIROPRACTIC MEDICINE | Facility: OTHER | Age: 79
End: 2019-04-04
Attending: CHIROPRACTOR
Payer: COMMERCIAL

## 2019-04-04 DIAGNOSIS — M54.6 PAIN IN THORACIC SPINE: ICD-10-CM

## 2019-04-04 DIAGNOSIS — M99.02 SEGMENTAL AND SOMATIC DYSFUNCTION OF THORACIC REGION: ICD-10-CM

## 2019-04-04 DIAGNOSIS — M99.04 SEGMENTAL AND SOMATIC DYSFUNCTION OF SACRAL REGION: Primary | ICD-10-CM

## 2019-04-04 DIAGNOSIS — M54.50 LOW BACK PAIN WITHOUT SCIATICA, UNSPECIFIED BACK PAIN LATERALITY, UNSPECIFIED CHRONICITY: ICD-10-CM

## 2019-04-04 DIAGNOSIS — M25.511 RIGHT SHOULDER PAIN: ICD-10-CM

## 2019-04-04 PROCEDURE — 98940 CHIROPRACT MANJ 1-2 REGIONS: CPT | Mod: AT | Performed by: CHIROPRACTOR

## 2019-04-04 PROCEDURE — 99202 OFFICE O/P NEW SF 15 MIN: CPT | Mod: 25 | Performed by: CHIROPRACTOR

## 2019-04-04 NOTE — PATIENT INSTRUCTIONS
Perform tennis ball massage, door frame stretch, and clamshell exercises as discussed at your visit.  Utilize ice and heat as needed for 15 minutes every 2 hours as needed.  Continue to perform other activities as tolerated.

## 2019-04-04 NOTE — PROGRESS NOTES
PATIENT:  Vikas Ji is a 79 year old male presenting for low back and right arm pain    PROBLEM:   Date of Initial Visit for this Episode:  4/4/2019     Visit #1    SUBJECTIVE / HPI: Patient presents with primary complaints of lower back pain.  Patient reports symptoms of low back pain have been ongoing for several years.  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  Patient denies any history of traumatic events or other problems with his back.  Patient has worked with chiropractic care most recently 10 years ago.  Patient denies any recent traumatic events or overuse injuries which may have caused his symptoms to increase.  Patient notes however symptoms have been progressively worsening over the past year.  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  Patient also notes secondary complaints of right arm pain.  Patient reports unsure of causation.  Patient reports approximately 6 months ago noticing pain when trying to pull a boat off of his tractor.  Patient notes pain increases with any type of rotational motion of the forearm.  Patient has not had prior history of right arm pain.  Due to ongoing symptoms patient presents to our office for further evaluation and treatment if necessary.    Duration and Frequency of Pain: Low back over the past year, right arm 6 months and intermittent  Radiation of pain: No numbness or tingling of the upper extremity.  Patient notes pain of the right arm extends from the shoulder to the elbow mostly laterally.  No numbness or tingling into the legs as reported by the patient  Pain rated at it's worst: 9/10  Pain rated currently:  3/10  Pain course: Not changing  Worse with: Standing, sitting, and lifting.  Patient also plays the accordion which will cause his right arm to become quite painful.  Improved by:  Nothing  Additional Features: Unremarkable  Other Health Care Providers seen for this: Patient was  under chiropractic care for her lower back complaints.  Patient did not name his chiropractor from 10 years ago.  Patient has had no prior history of right arm pain  Previous treatment: Patient has been under chiropractic care for low back complaints.  Previous injury: Unremarkable.  Patient noted low back pain first noted while he was in the [employer].          See flowsheets in chart for details.  4/4/2019   Oswestry (LILIA) Questionnaire    OSWESTRY DISABILITY INDEX 4/4/2019   Count 9   Sum 16   Oswestry Score (%) 35.56   Some recent data might be hidden      Functional limitations: Sitting, standing, lifting, and twisting motions of the right arm      Sleeping habits: unremarkable    Past D.C. Care: yes, helpful             PAST MEDICAL HISTORY:  Past Medical History:   Diagnosis Date     Essential (primary) hypertension     4/19/2011     Hypomagnesemia     9/10/2015     Other abnormalities of gait and mobility     10/6/2015     Pure hypercholesterolemia     4/19/2011     Type 2 diabetes mellitus with hyperglycemia (H)     9/10/2015       PAST SURGICAL HISTORY:  Past Surgical History:   Procedure Laterality Date     COLONOSCOPY      2010,F/U 2020  Ulcerative colitis       ALLERGIES:  No Known Allergies    CURRENT MEDICATIONS:  Current Outpatient Medications   Medication Sig Dispense Refill     aspirin 81 MG chewable tablet Take 81 mg by mouth once       atenolol (TENORMIN) 50 MG tablet Take 1 tablet (50 mg) by mouth 2 times daily - dose clarification 11/28/2018 180 tablet 3     calcium & magnesium carbonates 311-232 MG TABS Take 3 tablets by mouth daily       cyanocobalamin (RA VITAMIN B-12 TR) 1000 MCG TBCR Take 1 tablet by mouth daily       glipiZIDE (GLUCOTROL) 10 MG tablet Take 1 tablet by mouth 2 times daily (before meals) In AM and PM -- and add 1/2 tablet daily with Lunch.       insulin glargine (LANTUS) 100 UNIT/ML injection Inject 45 Units Subcutaneous every morning (before breakfast)       losartan  (COZAAR) 100 MG tablet Take 1 tablet by mouth daily       metFORMIN (GLUCOPHAGE) 1000 MG tablet Take 1 tablet by mouth 2 times daily (with meals)       Multiple Vitamin (MULTI-VITAMINS) TABS Take 2 tablets by mouth daily       omeprazole (PRILOSEC) 20 MG CR capsule Take 1 capsule by mouth daily       simvastatin (ZOCOR) 20 MG tablet Take 1 tablet by mouth daily (with dinner) Patient started taking 20 mg 11/25/15.         SOCIAL HISTORY:  Marital Status: .  Children: yes.  Occupation: Farmer.  Alcohol use:yes.  Tobacco use: Smoker: former.      FAMILY HISTORY:  History reviewed. No pertinent family history.    Patient Active Problem List   Diagnosis     Balance problems     Cough     Controlled type 2 diabetes mellitus without complication, with long-term current use of insulin (H)     Dietary B12 deficiency     Diverticulosis of large intestine     Generalized weakness     History of tobacco use     Inguinal hernia, right     Low blood magnesium level     Personal history of other diseases of digestive system     Benign essential hypertension     Mixed hyperlipidemia         ROS:  The patient denies any fevers, chills, nausea, vomiting, diarrhea, constipation,dysuria, hematuria, or urinary hesitancy or incontinence.  No shortness of breath, chest pain, or rashes.    OBJECTIVE:    DIAGNOSTICS:  No current spinal imaging taken.     PHYSICAL EXAM:     GENERAL APPEARANCE: healthy, alert, active, no distress, cooperative and smiling   GAIT: Patient does appear mildly antalgic anteriorly.      MUSCULOSKELETAL:   Posture: Generally poor.  Patient exhibits mild to moderate accentuation of upper thoracic kyphotic curvature with accentuation of cervical lordosis.  Right shoulder appears elevated compared to left and there is bilateral rounding of the shoulders however right is predominant.  Right iliac crest appears elevated when compared to the left.  Gait: See prior comment    .    Shoulder Depression: localized pain  on right side  Positive minors sign. Patient exhibited difficulty standing from a seated position.        Thoracic and Lumbar  ROM:  50/60 flexion with pain 50/60 extension    40/45 RLF    30/45 LLF with pain   30/45 RR      45/45 LR    +Kemps: bilateral at the sacral base  - Straight leg raise.   +Leg length inequality: none [place]ield +; Restricted left heel to buttock   Other:  + left Ely's, +left nachlas    +Tenderness: Present at the sacral base bilaterally.  Tenderness also noted at T3 on the right and T10 midline  +Muscle spasm not present.  Localized taut and tender fibers of the thoracic paraspinal musculature are noted primarily located at T3 and T10.  Right lower trapezius and right rhomboids are also taut and tender.  Tender fibers are also noted of the lumbar paraspinals bilaterally.  +Joint asymmetry and restriction: T3 with extension and right rotation.  T10 with extension.  S1 with extension    ASSESSMENT: Vikas Ji is a 79 year old male presenting with complaints of low back pain and right arm pain.  Low back pain does appear to be due to segmental somatic dysfunction of the sacrum.  Right arm pain as reported by the patient appears to be due to mechanical alteration of the thoracic spine resulting in manifestation of pain symptoms with in the right shoulder.  While there could possibly be injuries within the glenohumeral joint itself patient history did not reveal traumas or other over-use injuries consistent with soft tissue injuries of the shoulder.  We will continue to monitor patient's right arm pain as it is possible other issues may still exist.  No other contraindications present and I do believe patient will respond well with course of chiropractic care.  We will also provide patient with home exercises.     1. Segmental and somatic dysfunction of sacral region    2. Low back pain without sciatica, unspecified back pain laterality, unspecified chronicity    3. Segmental and somatic  dysfunction of thoracic region    4. Pain in thoracic spine    5. Right shoulder pain        PLAN    Evaluation and Management:  80805 Low to moderate level exam 20 min    Procedures:  Modalities:  None performed this visit    CMT:  11189 Chiropractic manipulative treatment 1-2 regions performed   Thoracic: Diversified, T3, T10, Prone  Pelvis: Drop Table, Sacrum , Prone    Therapeutic procedures:  Patient was educated on self massage by use of a tennis ball.  Patient was instructed to place tennis ball against a solid surface such as a wall, hard back chair, or floor.  Patient was then instructed to place affected sore muscles against the ball and massage for up to 2 minutes or as tolerated.  This to be done 1-2 times a day.  Patient was instructed on door frame stretch.  Patient was to grasp doorframe with affected side of body, in this case the right, patient is then to return ipsilaterally noting stretching sensation of the right scapula and posterior musculature.  This to be performed for 1-2 minutes 1-2 times daily.  Patient was also instructed on clamshell exercises.  Patient is to sit with a piece of resistance band such as Thera-Band wrapped proximally to his knees.  Patient was then instructed to keep his feet planted on the floor and move his knees laterally to one another.  This to be done for 3 sets of 10 repetitions or as tolerated by the patient 1-2 times a day.    Response to Treatment  Reduction in symptoms as reported by patient    Prognosis: Good    4/4/2019 Plan of Care:  4-6 visits of Chiropractic Care including Spinal Adjustments and/or physiotherapy and active rehabilitation, to include exercises in the office and/or at home to meet care plan goals.     Frequency: 1-2xweek for up to 4 weeks. A reevaluation would be clinically appropriate in 4-6 visits, to determine progress and further course of care.    POC discussed and patient agreeable to plan of care.      4/4/2019 Goals:      Patient will  report improved pain of the low back and right arm.   Patient will report able to sit, stand, and lift without painful limits.   Patient will report able to play the accordion without painful limits.   Patient will demonstrate an improved ability to complete Activities of Daily Living  as shown by a reported 10% reduced score on back index.    Patient will demonstrate improved ROM of the thoracic and lumbar spine.        INSTRUCTIONS   Perform tennis ball massage, door frame stretch, and clamshell exercises as discussed at your visit.  Utilize ice and heat as needed for 15 minutes every 2 hours as needed.  Continue to perform other activities as tolerated.    Follow-up:  Return to care in 5 days.        Disclaimer: This note consists of symbols derived from keyboarding, dictation and/or voice recognition software. As a result, there may be errors in the script that have gone undetected. Please consider this when interpreting information found in this chart.

## 2019-09-17 PROBLEM — E53.8 VITAMIN B12 DEFICIENCY: Status: ACTIVE | Noted: 2019-09-17

## 2019-09-17 PROBLEM — R41.3 MEMORY PROBLEM: Status: ACTIVE | Noted: 2019-09-17

## 2019-09-17 NOTE — PROGRESS NOTES
"Nursing Notes:   Yesenia Trammell LPN  9/18/2019  2:50 PM  Signed  Patient presents to the clinic for gradual memory loss for the past year-increase difficulty over the past 1-2 months.      Previous A1C is at goal of <8  Lab Results   Component Value Date    A1C 6.7 11/28/2018     Urine microalbumin:creatine: n.a  [facility]  [facility]    Tobacco User no  Patient is on a daily aspirin  Patient is on a Statin.  Blood pressure today of:     BP Readings from Last 1 Encounters:   11/28/18 138/74      is at the goal of <139/89 for diabetics.      Chief Complaint   Patient presents with     Memory Loss       Initial /84 (BP Location: Right arm, Patient Position: Sitting, Cuff Size: Adult Regular)   Pulse 108   Temp 98.1  F (36.7  C) (Tympanic)   Resp 16   Ht 1.721 m (5' 7.75\")   Wt 79.5 kg (175 lb 4 oz)   BMI 26.84 kg/m    Estimated body mass index is 26.84 kg/m  as calculated from the following:    Height as of this encounter: 1.721 m (5' 7.75\").    Weight as of this encounter: 79.5 kg (175 lb 4 oz).  Medication Reconciliation: complete    Yesenia Trammell LPN    Nursing note reviewed with patient.  Accuracy and completeness verified.   Mr. Ji is a 79 year old male who:  Patient presents with:  Memory Loss      ICD-10-CM    1. Memory problem R41.3    2. Vitamin B12 deficiency E53.8 cyanocobalamin injection 1,000 mcg   3. Controlled type 2 diabetes mellitus without complication, with long-term current use of insulin (H) E11.9 Hemoglobin A1c    Z79.4 Comprehensive metabolic panel     CBC with platelets   4. Mixed hyperlipidemia E78.2 Lipid Profile     HPI  Patient comes in with his wife.  Daughter states that he is been having more issues with memory in the last couple months.  The last year has also been noticing some decrease in memory.  More balance issues recently as well.  Vitamin B12 level was noted to be very low back in 2015.  See results below.  We discussed need for B12/B " complex replacement.  He would like to get a B12 shot today.    [facility]  [facility].    Hypertension, currently controlled.  Tolerating current medication regimen.  Labs today.    Mixed hyperlipidemia, seems to be doing well with simvastatin.  [facility]  Labs today.    Functional Capacity: about 4 METS.   Review of Systems   Constitutional: Negative for chills and fever.   HENT: Negative for congestion and hearing loss.    Eyes: Negative for visual disturbance.   Respiratory: Negative for cough, shortness of breath and wheezing.    Cardiovascular: Negative for chest pain and palpitations.   Gastrointestinal: Negative for abdominal pain, diarrhea, nausea and vomiting.   Endocrine: Negative for cold intolerance and heat intolerance.   Genitourinary: Negative for dysuria and hematuria.   Musculoskeletal: Positive for gait problem (+ some balance problems  ). Negative for arthralgias and myalgias.   Skin: Negative for rash and wound.   Allergic/Immunologic: Negative for immunocompromised state.   Neurological: Negative for dizziness and light-headedness.   Hematological: Does not bruise/bleed easily.   Psychiatric/Behavioral: Positive for confusion (  + memory loss). Negative for agitation.        LEYDA:   LEYDA-7 SCORE 11/28/2018 9/18/2019   Total Score 0 0     PHQ9:  PHQ-9 SCORE 10/6/2015 11/28/2018 9/18/2019   PHQ-9 Total Score 1 0 0       I have personally reviewed the past medical history, past surgical history, medications, allergies, family and social history as listed below.     No Known Allergies    Current Outpatient Medications   Medication Sig Dispense Refill     aspirin 81 MG chewable tablet Take 81 mg by mouth once       calcium & magnesium carbonates 311-232 MG TABS Take 3 tablets by mouth daily       glipiZIDE (GLUCOTROL) 10 MG tablet Take 1 tablet by mouth 2 times daily (before meals) In AM and PM -- and add 1/2 tablet daily with Lunch.       insulin glargine  (LANTUS) 100 UNIT/ML injection Inject 45 Units Subcutaneous every morning (before breakfast)       losartan (COZAAR) 100 MG tablet Take 1 tablet by mouth daily       metFORMIN (GLUCOPHAGE) 1000 MG tablet Take 1 tablet by mouth 2 times daily (with meals)       Multiple Vitamin (MULTI-VITAMINS) TABS Take 2 tablets by mouth daily       omeprazole (PRILOSEC) 20 MG CR capsule Take 1 capsule by mouth daily       simvastatin (ZOCOR) 20 MG tablet Take 1 tablet by mouth daily (with dinner) Patient started taking 20 mg 11/25/15.          Patient Active Problem List    Diagnosis Date Noted     Vitamin B12 deficiency 09/17/2019     Priority: Medium     Memory problem 09/17/2019     Priority: Medium     Benign essential hypertension 11/28/2018     Priority: Medium     Mixed hyperlipidemia 11/28/2018     Priority: Medium     Balance problems 10/06/2015     Priority: Medium     Generalized weakness 10/06/2015     Priority: Medium     Controlled type 2 diabetes mellitus without complication, with long-term current use of insulin (H) 09/10/2015     Priority: Medium     History of tobacco use 09/10/2015     Priority: Medium     Low blood magnesium level 09/10/2015     Priority: Medium     Cough 11/28/2012     Priority: Medium     Inguinal hernia, right 04/19/2011     Priority: Medium     Diverticulosis of large intestine 10/18/2010     Priority: Medium     Personal history of other diseases of digestive system 10/18/2010     Priority: Medium     Past Medical History:   Diagnosis Date     Essential (primary) hypertension     4/19/2011     Hypomagnesemia     9/10/2015     Other abnormalities of gait and mobility     10/6/2015     Pure hypercholesterolemia     4/19/2011     Type 2 diabetes mellitus with hyperglycemia (H)     9/10/2015     Past Surgical History:   Procedure Laterality Date     COLONOSCOPY  09/27/2010 2010,F/U 2020  Ulcerative colitis     Social History     Socioeconomic History     Marital status:      Spouse  "name: None     Number of children: None     Years of education: None     Highest education level: None   Occupational History     None   Social Needs     Financial resource strain: None     Food insecurity:     Worry: None     Inability: None     Transportation needs:     Medical: None     Non-medical: None   Tobacco Use     Smoking status: Former Smoker     Last attempt to quit: 1987     Years since quittin.7     Smokeless tobacco: Current User     Types: Snuff   Substance and Sexual Activity     Alcohol use: Yes     Alcohol/week: 3.6 oz     Drug use: No     Sexual activity: Never   Lifestyle     Physical activity:     Days per week: None     Minutes per session: None     Stress: None   Relationships     Social connections:     Talks on phone: None     Gets together: None     Attends Protestant service: None     Active member of club or organization: None     Attends meetings of clubs or organizations: None     Relationship status: None     Intimate partner violence:     Fear of current or ex partner: None     Emotionally abused: None     Physically abused: None     Forced sexual activity: None   Other Topics Concern     Parent/sibling w/ CABG, MI or angioplasty before 65F 55M? Not Asked   Social History Narrative    , lives with his wife, remains active     History reviewed. No pertinent family history.    EXAM:   Vitals:    19 1433   BP: 132/84   BP Location: Right arm   Patient Position: Sitting   Cuff Size: Adult Regular   Pulse: 108   Resp: 16   Temp: 98.1  F (36.7  C)   TempSrc: Tympanic   Weight: 79.5 kg (175 lb 4 oz)   Height: 1.721 m (5' 7.75\")       Current Pain Score: No Pain (0)     BP Readings from Last 3 Encounters:   19 132/84   18 138/74   18 153/85      Wt Readings from Last 3 Encounters:   19 79.5 kg (175 lb 4 oz)   18 81.7 kg (180 lb 2 oz)   16 88.5 kg (195 lb)      Estimated body mass index is 26.84 kg/m  as calculated from the " "following:    Height as of this encounter: 1.721 m (5' 7.75\").    Weight as of this encounter: 79.5 kg (175 lb 4 oz).     Physical Exam   Constitutional: He appears well-developed and well-nourished. No distress.   HENT:   Head: Normocephalic and atraumatic.   Eyes: Conjunctivae are normal. No scleral icterus.   Neck: Neck supple.   Cardiovascular: Normal rate and regular rhythm.   Pulmonary/Chest: Effort normal and breath sounds normal.   Abdominal: Soft. There is no tenderness.   Musculoskeletal: He exhibits no deformity.   Lymphadenopathy:     He has no cervical adenopathy.   Neurological: He is alert.   Skin: Skin is warm and dry. No rash noted. He is not diaphoretic.   Psychiatric: He has a normal mood and affect.        Procedures   INVESTIGATIONS:  Results for orders placed or performed in visit on 09/18/19   CBC with platelets   Result Value Ref Range    WBC 7.3 4.0 - 11.0 10e9/L    RBC Count 4.70 4.4 - 5.9 10e12/L    Hemoglobin 14.9 13.3 - 17.7 g/dL    Hematocrit 43.3 40.0 - 53.0 %    MCV 92 78 - 100 fl    MCH 31.7 26.5 - 33.0 pg    MCHC 34.4 31.5 - 36.5 g/dL    RDW 12.1 10.0 - 15.0 %    Platelet Count 193 150 - 450 10e9/L       ASSESSMENT AND PLAN:  Problem List Items Addressed This Visit        Digestive    Vitamin B12 deficiency    Relevant Medications    cyanocobalamin injection 1,000 mcg       Endocrine    Controlled type 2 diabetes mellitus without complication, with long-term current use of insulin (H)    Mixed hyperlipidemia       Other    Memory problem - Primary        reviewed diet, exercise and weight control, recommended sodium restriction, cardiovascular risk and specific lipid/LDL goals reviewed  -- Expected clinical course discussed    -- Medications and their side effects discussed    Patient Instructions     Goal B12 level is about 1000.    B12 shot today.     -- Take a Super-B-Complex with B12 -- DAILY -- to help energy / mood and balance.     -- Consider Under the tongue / Liquid. " ([facility])     Results for DYLON DESOUZA (MRN [id number]) as of 9/18/2019 14:51   Ref. Range 10/6/2015 17:24 11/4/2015 17:44   Vitamin B12 Latest Ref Range: 180 - 914 pg/mL 285 354       The Memory pills are not very helpful..... (Like Namenda)    Return as needed for follow-up with Dr. Salter.    Clinic : [phone]  Appointment line: [phone]     Ace Salter MD  Internal Medicine  [facility]     Portions of this note were dictated using speech recognition software. The note has been proofread but errors in the text may have been overlooked. Please contact me if there are any concerns regarding the accuracy of the dictation.

## 2019-09-18 ENCOUNTER — OFFICE VISIT (OUTPATIENT)
Dept: INTERNAL MEDICINE | Facility: OTHER | Age: 79
End: 2019-09-18
Attending: INTERNAL MEDICINE
Payer: COMMERCIAL

## 2019-09-18 VITALS
HEART RATE: 108 BPM | SYSTOLIC BLOOD PRESSURE: 132 MMHG | RESPIRATION RATE: 16 BRPM | HEIGHT: 68 IN | BODY MASS INDEX: 26.56 KG/M2 | WEIGHT: 175.25 LBS | TEMPERATURE: 98.1 F | DIASTOLIC BLOOD PRESSURE: 84 MMHG

## 2019-09-18 DIAGNOSIS — E78.2 MIXED HYPERLIPIDEMIA: ICD-10-CM

## 2019-09-18 DIAGNOSIS — Z79.4 CONTROLLED TYPE 2 DIABETES MELLITUS WITHOUT COMPLICATION, WITH LONG-TERM CURRENT USE OF INSULIN (H): ICD-10-CM

## 2019-09-18 DIAGNOSIS — R41.3 MEMORY PROBLEM: Primary | ICD-10-CM

## 2019-09-18 DIAGNOSIS — E53.8 VITAMIN B12 DEFICIENCY: ICD-10-CM

## 2019-09-18 DIAGNOSIS — E11.9 CONTROLLED TYPE 2 DIABETES MELLITUS WITHOUT COMPLICATION, WITH LONG-TERM CURRENT USE OF INSULIN (H): ICD-10-CM

## 2019-09-18 LAB
ALBUMIN SERPL-MCNC: 4.5 G/DL (ref 3.5–5.7)
ALP SERPL-CCNC: 45 U/L (ref 34–104)
ALT SERPL W P-5'-P-CCNC: 11 U/L (ref 7–52)
ANION GAP SERPL CALCULATED.3IONS-SCNC: 8 MMOL/L (ref 3–14)
AST SERPL W P-5'-P-CCNC: 17 U/L (ref 13–39)
BILIRUB SERPL-MCNC: 0.5 MG/DL (ref 0.3–1)
BUN SERPL-MCNC: 16 MG/DL (ref 7–25)
CALCIUM SERPL-MCNC: 9.4 MG/DL (ref 8.6–10.3)
CHLORIDE SERPL-SCNC: 102 MMOL/L (ref 98–107)
CHOLEST SERPL-MCNC: 122 MG/DL
CO2 SERPL-SCNC: 24 MMOL/L (ref 21–31)
CREAT SERPL-MCNC: 0.79 MG/DL (ref 0.7–1.3)
ERYTHROCYTE [DISTWIDTH] IN BLOOD BY AUTOMATED COUNT: 12.1 % (ref 10–15)
GFR SERPL CREATININE-BSD FRML MDRD: >90 ML/MIN/{1.73_M2}
GLUCOSE SERPL-MCNC: 76 MG/DL (ref 70–105)
HBA1C MFR BLD: 5.8 % (ref 4–6)
HCT VFR BLD AUTO: 43.3 % (ref 40–53)
HDLC SERPL-MCNC: 36 MG/DL (ref 23–92)
HGB BLD-MCNC: 14.9 G/DL (ref 13.3–17.7)
LDLC SERPL CALC-MCNC: 67 MG/DL
MCH RBC QN AUTO: 31.7 PG (ref 26.5–33)
MCHC RBC AUTO-ENTMCNC: 34.4 G/DL (ref 31.5–36.5)
MCV RBC AUTO: 92 FL (ref 78–100)
NONHDLC SERPL-MCNC: 86 MG/DL
PLATELET # BLD AUTO: 193 10E9/L (ref 150–450)
POTASSIUM SERPL-SCNC: 4.2 MMOL/L (ref 3.5–5.1)
PROT SERPL-MCNC: 6.6 G/DL (ref 6.4–8.9)
RBC # BLD AUTO: 4.7 10E12/L (ref 4.4–5.9)
SODIUM SERPL-SCNC: 134 MMOL/L (ref 134–144)
TRIGL SERPL-MCNC: 97 MG/DL
WBC # BLD AUTO: 7.3 10E9/L (ref 4–11)

## 2019-09-18 PROCEDURE — 96372 THER/PROPH/DIAG INJ SC/IM: CPT

## 2019-09-18 PROCEDURE — 85027 COMPLETE CBC AUTOMATED: CPT | Mod: ZL | Performed by: INTERNAL MEDICINE

## 2019-09-18 PROCEDURE — 36415 COLL VENOUS BLD VENIPUNCTURE: CPT | Mod: ZL | Performed by: INTERNAL MEDICINE

## 2019-09-18 PROCEDURE — 99214 OFFICE O/P EST MOD 30 MIN: CPT | Performed by: INTERNAL MEDICINE

## 2019-09-18 PROCEDURE — 80053 COMPREHEN METABOLIC PANEL: CPT | Mod: ZL | Performed by: INTERNAL MEDICINE

## 2019-09-18 PROCEDURE — 25000128 H RX IP 250 OP 636: Performed by: INTERNAL MEDICINE

## 2019-09-18 PROCEDURE — 83036 HEMOGLOBIN GLYCOSYLATED A1C: CPT | Mod: ZL | Performed by: INTERNAL MEDICINE

## 2019-09-18 PROCEDURE — 80061 LIPID PANEL: CPT | Mod: ZL | Performed by: INTERNAL MEDICINE

## 2019-09-18 RX ORDER — CYANOCOBALAMIN 1000 UG/ML
1000 INJECTION, SOLUTION INTRAMUSCULAR; SUBCUTANEOUS ONCE
Status: COMPLETED | OUTPATIENT
Start: 2019-09-18 | End: 2019-09-18

## 2019-09-18 RX ADMIN — CYANOCOBALAMIN 1000 MCG: 1000 INJECTION, SOLUTION INTRAMUSCULAR at 15:13

## 2019-09-18 ASSESSMENT — MIFFLIN-ST. JEOR: SCORE: 1475.46

## 2019-09-18 ASSESSMENT — ENCOUNTER SYMPTOMS
CHILLS: 0
VOMITING: 0
BRUISES/BLEEDS EASILY: 0
DYSURIA: 0
HEMATURIA: 0
PALPITATIONS: 0
CONFUSION: 1
COUGH: 0
DIZZINESS: 0
SHORTNESS OF BREATH: 0
WHEEZING: 0
WOUND: 0
LIGHT-HEADEDNESS: 0
DIARRHEA: 0
FEVER: 0
MYALGIAS: 0
ARTHRALGIAS: 0
ABDOMINAL PAIN: 0
NAUSEA: 0
AGITATION: 0

## 2019-09-18 ASSESSMENT — ANXIETY QUESTIONNAIRES
5. BEING SO RESTLESS THAT IT IS HARD TO SIT STILL: NOT AT ALL
1. FEELING NERVOUS, ANXIOUS, OR ON EDGE: NOT AT ALL
7. FEELING AFRAID AS IF SOMETHING AWFUL MIGHT HAPPEN: NOT AT ALL
3. WORRYING TOO MUCH ABOUT DIFFERENT THINGS: NOT AT ALL
GAD7 TOTAL SCORE: 0
IF YOU CHECKED OFF ANY PROBLEMS ON THIS QUESTIONNAIRE, HOW DIFFICULT HAVE THESE PROBLEMS MADE IT FOR YOU TO DO YOUR WORK, TAKE CARE OF THINGS AT HOME, OR GET ALONG WITH OTHER PEOPLE: NOT DIFFICULT AT ALL
6. BECOMING EASILY ANNOYED OR IRRITABLE: NOT AT ALL
2. NOT BEING ABLE TO STOP OR CONTROL WORRYING: NOT AT ALL

## 2019-09-18 ASSESSMENT — PATIENT HEALTH QUESTIONNAIRE - PHQ9
SUM OF ALL RESPONSES TO PHQ QUESTIONS 1-9: 0
5. POOR APPETITE OR OVEREATING: NOT AT ALL

## 2019-09-18 ASSESSMENT — PAIN SCALES - GENERAL: PAINLEVEL: NO PAIN (0)

## 2019-09-18 NOTE — NURSING NOTE
"Patient presents to the clinic for gradual memory loss for the past year-increase difficulty over the past 1-2 months.      Previous A1C is at goal of <8  Lab Results   Component Value Date    A1C 6.7 11/28/2018     Urine microalbumin:creatine: n.a  [facility]  [facility]    Tobacco User no  Patient is on a daily aspirin  Patient is on a Statin.  Blood pressure today of:     BP Readings from Last 1 Encounters:   11/28/18 138/74      is at the goal of <139/89 for diabetics.      Chief Complaint   Patient presents with     Memory Loss       Initial /84 (BP Location: Right arm, Patient Position: Sitting, Cuff Size: Adult Regular)   Pulse 108   Temp 98.1  F (36.7  C) (Tympanic)   Resp 16   Ht 1.721 m (5' 7.75\")   Wt 79.5 kg (175 lb 4 oz)   BMI 26.84 kg/m   Estimated body mass index is 26.84 kg/m  as calculated from the following:    Height as of this encounter: 1.721 m (5' 7.75\").    Weight as of this encounter: 79.5 kg (175 lb 4 oz).  Medication Reconciliation: complete    Yesenia Trammell LPN    "

## 2019-09-18 NOTE — PATIENT INSTRUCTIONS
Goal B12 level is about 1000.    B12 shot today.     -- Take a Super-B-Complex with B12 -- DAILY -- to help energy / mood and balance.     -- Consider Under the tongue / Liquid. ([facility])     Results for DYLON DESOUZA (MRN [id number]) as of 9/18/2019 14:51   Ref. Range 10/6/2015 17:24 11/4/2015 17:44   Vitamin B12 Latest Ref Range: 180 - 914 pg/mL 285 354       The Memory pills are not very helpful..... (Like Namenda)    Return as needed for follow-up with Dr. Salter.    Clinic : [phone]  Appointment line: [phone]

## 2019-09-18 NOTE — LETTER
September 18, 2019      Vikas Ji  [address] [address] [address]        Dear ,    We are writing to inform you of your test results.    Your labs look excellent.  Continue current medications.    Resulted Orders   Hemoglobin A1c   Result Value Ref Range    Hemoglobin A1C 5.8 4.0 - 6.0 %   Lipid Profile   Result Value Ref Range    Cholesterol 122 <200 mg/dL    Triglycerides 97 <150 mg/dL    HDL Cholesterol 36 23 - 92 mg/dL    LDL Cholesterol Calculated 67 <100 mg/dL      Comment:      Desirable:       <100 mg/dl    Non HDL Cholesterol 86 <130 mg/dL   Comprehensive metabolic panel   Result Value Ref Range    Sodium 134 134 - 144 mmol/L    Potassium 4.2 3.5 - 5.1 mmol/L    Chloride 102 98 - 107 mmol/L    Carbon Dioxide 24 21 - 31 mmol/L    Anion Gap 8 3 - 14 mmol/L    Glucose 76 70 - 105 mg/dL    Urea Nitrogen 16 7 - 25 mg/dL    Creatinine 0.79 0.70 - 1.30 mg/dL    GFR Estimate >90 >60 mL/min/[1.73_m2]    GFR Estimate If Black >90 >60 mL/min/[1.73_m2]    Calcium 9.4 8.6 - 10.3 mg/dL    Bilirubin Total 0.5 0.3 - 1.0 mg/dL    Albumin 4.5 3.5 - 5.7 g/dL    Protein Total 6.6 6.4 - 8.9 g/dL    Alkaline Phosphatase 45 34 - 104 U/L    ALT 11 7 - 52 U/L    AST 17 13 - 39 U/L   CBC with platelets   Result Value Ref Range    WBC 7.3 4.0 - 11.0 10e9/L    RBC Count 4.70 4.4 - 5.9 10e12/L    Hemoglobin 14.9 13.3 - 17.7 g/dL    Hematocrit 43.3 40.0 - 53.0 %    MCV 92 78 - 100 fl    MCH 31.7 26.5 - 33.0 pg    MCHC 34.4 31.5 - 36.5 g/dL    RDW 12.1 10.0 - 15.0 %    Platelet Count 193 150 - 450 10e9/L       If you have any questions or concerns, please call the clinic at the number listed above.       Sincerely,        Ace Salter MD

## 2019-09-19 DIAGNOSIS — Z79.4 CONTROLLED TYPE 2 DIABETES MELLITUS WITHOUT COMPLICATION, WITH LONG-TERM CURRENT USE OF INSULIN (H): ICD-10-CM

## 2019-09-19 DIAGNOSIS — E11.9 CONTROLLED TYPE 2 DIABETES MELLITUS WITHOUT COMPLICATION, WITH LONG-TERM CURRENT USE OF INSULIN (H): ICD-10-CM

## 2019-09-19 LAB
ALBUMIN UR-MCNC: NEGATIVE MG/DL
APPEARANCE UR: CLEAR
BILIRUB UR QL STRIP: NEGATIVE
COLOR UR AUTO: YELLOW
CREAT UR-MCNC: 44 MG/DL
GLUCOSE UR STRIP-MCNC: NEGATIVE MG/DL
HGB UR QL STRIP: NEGATIVE
KETONES UR STRIP-MCNC: NEGATIVE MG/DL
LEUKOCYTE ESTERASE UR QL STRIP: NEGATIVE
MICROALBUMIN UR-MCNC: 6 MG/L
MICROALBUMIN/CREAT UR: 13.11 MG/G CR (ref 0–17)
NITRATE UR QL: NEGATIVE
PH UR STRIP: 5.5 PH (ref 5–9)
SOURCE: NORMAL
SP GR UR STRIP: 1.01 (ref 1–1.03)
UROBILINOGEN UR STRIP-ACNC: 0.2 EU/DL (ref 0.2–1)

## 2019-09-19 PROCEDURE — 82043 UR ALBUMIN QUANTITATIVE: CPT | Mod: ZL | Performed by: INTERNAL MEDICINE

## 2019-09-19 PROCEDURE — 81003 URINALYSIS AUTO W/O SCOPE: CPT | Mod: ZL | Performed by: INTERNAL MEDICINE

## 2019-09-19 ASSESSMENT — ANXIETY QUESTIONNAIRES: GAD7 TOTAL SCORE: 0

## 2019-09-19 NOTE — NURSING NOTE
Clinic Administered Medication Documentation    MEDICATION LIST:   Injectable Medication Documentation    Patient was given Cyanocobalamin (B-12). Prior to medication administration, verified patients identity using patient s name and date of birth. Please see MAR and medication order for additional information. Patient instructed to remain in clinic for 15 minutes.      Was entire vial of medication used? Yes  Vial/Syringe: Single dose vial  Expiration Date:  02/01/2021  Was this medication supplied by the patient? No       Yesenia Trammell LPN 9/19/2019 12:35 PM

## 2019-09-28 ENCOUNTER — HOSPITAL ENCOUNTER (EMERGENCY)
Facility: OTHER | Age: 79
Discharge: HOME | End: 2019-09-28
Attending: EMERGENCY MEDICINE | Admitting: EMERGENCY MEDICINE
Payer: COMMERCIAL

## 2019-09-28 VITALS
WEIGHT: 197.09 LBS | BODY MASS INDEX: 28.22 KG/M2 | TEMPERATURE: 96.8 F | RESPIRATION RATE: 14 BRPM | OXYGEN SATURATION: 97 % | HEART RATE: 75 BPM | HEIGHT: 70 IN | DIASTOLIC BLOOD PRESSURE: 78 MMHG | SYSTOLIC BLOOD PRESSURE: 149 MMHG

## 2019-09-28 DIAGNOSIS — E16.2 HYPOGLYCEMIA: ICD-10-CM

## 2019-09-28 LAB
ALBUMIN UR-MCNC: NEGATIVE MG/DL
ANION GAP SERPL CALCULATED.3IONS-SCNC: 9 MMOL/L (ref 3–14)
APPEARANCE UR: CLEAR
BASOPHILS # BLD AUTO: 0 10E9/L (ref 0–0.2)
BASOPHILS NFR BLD AUTO: 0.3 %
BILIRUB UR QL STRIP: NEGATIVE
BUN SERPL-MCNC: 14 MG/DL (ref 7–25)
CALCIUM SERPL-MCNC: 9.4 MG/DL (ref 8.6–10.3)
CHLORIDE SERPL-SCNC: 99 MMOL/L (ref 98–107)
CO2 SERPL-SCNC: 25 MMOL/L (ref 21–31)
COLOR UR AUTO: YELLOW
CREAT SERPL-MCNC: 0.83 MG/DL (ref 0.7–1.3)
DIFFERENTIAL METHOD BLD: NORMAL
EOSINOPHIL # BLD AUTO: 0.3 10E9/L (ref 0–0.7)
EOSINOPHIL NFR BLD AUTO: 3.3 %
ERYTHROCYTE [DISTWIDTH] IN BLOOD BY AUTOMATED COUNT: 12 % (ref 10–15)
GFR SERPL CREATININE-BSD FRML MDRD: 89 ML/MIN/{1.73_M2}
GLUCOSE SERPL-MCNC: 87 MG/DL (ref 70–105)
GLUCOSE UR STRIP-MCNC: NEGATIVE MG/DL
HCT VFR BLD AUTO: 40.6 % (ref 40–53)
HGB BLD-MCNC: 13.9 G/DL (ref 13.3–17.7)
HGB UR QL STRIP: NEGATIVE
IMM GRANULOCYTES # BLD: 0 10E9/L (ref 0–0.4)
IMM GRANULOCYTES NFR BLD: 0.3 %
KETONES UR STRIP-MCNC: ABNORMAL MG/DL
LEUKOCYTE ESTERASE UR QL STRIP: NEGATIVE
LYMPHOCYTES # BLD AUTO: 1 10E9/L (ref 0.8–5.3)
LYMPHOCYTES NFR BLD AUTO: 11.6 %
MCH RBC QN AUTO: 31.5 PG (ref 26.5–33)
MCHC RBC AUTO-ENTMCNC: 34.2 G/DL (ref 31.5–36.5)
MCV RBC AUTO: 92 FL (ref 78–100)
MONOCYTES # BLD AUTO: 0.5 10E9/L (ref 0–1.3)
MONOCYTES NFR BLD AUTO: 5.8 %
NEUTROPHILS # BLD AUTO: 6.8 10E9/L (ref 1.6–8.3)
NEUTROPHILS NFR BLD AUTO: 78.7 %
NITRATE UR QL: NEGATIVE
PH UR STRIP: 6.5 PH (ref 5–9)
PLATELET # BLD AUTO: 181 10E9/L (ref 150–450)
POTASSIUM SERPL-SCNC: 3.9 MMOL/L (ref 3.5–5.1)
RBC # BLD AUTO: 4.41 10E12/L (ref 4.4–5.9)
SODIUM SERPL-SCNC: 133 MMOL/L (ref 134–144)
SOURCE: ABNORMAL
SP GR UR STRIP: 1.01 (ref 1–1.03)
UROBILINOGEN UR STRIP-ACNC: 0.2 EU/DL (ref 0.2–1)
WBC # BLD AUTO: 8.6 10E9/L (ref 4–11)

## 2019-09-28 PROCEDURE — 81003 URINALYSIS AUTO W/O SCOPE: CPT | Performed by: EMERGENCY MEDICINE

## 2019-09-28 PROCEDURE — 99283 EMERGENCY DEPT VISIT LOW MDM: CPT | Performed by: EMERGENCY MEDICINE

## 2019-09-28 PROCEDURE — 99282 EMERGENCY DEPT VISIT SF MDM: CPT | Mod: Z6 | Performed by: EMERGENCY MEDICINE

## 2019-09-28 PROCEDURE — 36415 COLL VENOUS BLD VENIPUNCTURE: CPT | Performed by: EMERGENCY MEDICINE

## 2019-09-28 PROCEDURE — 80048 BASIC METABOLIC PNL TOTAL CA: CPT | Performed by: EMERGENCY MEDICINE

## 2019-09-28 PROCEDURE — 85025 COMPLETE CBC W/AUTO DIFF WBC: CPT | Performed by: EMERGENCY MEDICINE

## 2019-09-28 ASSESSMENT — ENCOUNTER SYMPTOMS
NAUSEA: 0
CONFUSION: 1
CHEST TIGHTNESS: 0
VOMITING: 0
AGITATION: 0
LIGHT-HEADEDNESS: 0
FEVER: 0
DYSURIA: 0
CHILLS: 0
ARTHRALGIAS: 0
SHORTNESS OF BREATH: 0

## 2019-09-28 ASSESSMENT — MIFFLIN-ST. JEOR: SCORE: 1610.25

## 2019-09-28 NOTE — ED AVS SNAPSHOT
[facility]  [address]  [place] [address]  Phone:  [phone]  Fax:  [phone]                                    Vikas Ji   MRN: [id number]    Department:  [facility]   Date of Visit:  9/28/2019           After Visit Summary Signature Page    I have received my discharge instructions, and my questions have been answered. I have discussed any challenges I see with this plan with the nurse or doctor.    ..........................................................................................................................................  Patient/Patient Representative Signature      ..........................................................................................................................................  Patient Representative Print Name and Relationship to Patient    ..................................................               ................................................  Date                                   Time    ..........................................................................................................................................  Reviewed by Signature/Title    ...................................................              ..............................................  Date                                               Time          22EPIC Rev 08/18

## 2019-09-28 NOTE — ED TRIAGE NOTES
"Patient at a restaurant and suddenfy felt weak, dizzy and \"jibbery\", odd behavior per family.  Patient having difficulty following commands, repeats sentences. Denies pain. Family says it lasted about 30 minutes.  last known well at 1730.  "

## 2019-09-29 NOTE — ED NOTES
Patient drinking juice and milk and eating cookies. Provider at bedside. Christine Hernandez RN on 9/28/2019 at 7:10 PM

## 2019-09-29 NOTE — ED PROVIDER NOTES
"  History     Chief Complaint   Patient presents with     Altered Mental Status     HPI  Vikas Ji is a 79 year old male who is brought in by family after having had an episode of confusion which has resolved.  Patient is a type II diabetic.  He does say that he took his insulin this morning, however he is a little bit of a hard time remembering exactly how much he took and what his blood sugar was.  He just takes the Lantus once daily.  He is also on both glipizide and metformin.  Says he has been taking his normally.  He thinks maybe he was a little is here today than normal and is not sure if he ate well this afternoon.  His wife thought he looked a little bit listless all day.  They went to a restaurant and as he was walking into the restaurant he stumbled and nearly fell.  He was then \"acting goofy\" seemed confused and even had a little bit of hard time sitting upright in the chair.  He was able to eat a little bit there and they brought him here.  By the time he arrives here his mentation is pretty much back to baseline.  Initial glucose was 60 here.    Allergies:  No Known Allergies    Problem List:    Patient Active Problem List    Diagnosis Date Noted     Vitamin B12 deficiency 09/17/2019     Priority: Medium     Memory problem 09/17/2019     Priority: Medium     Benign essential hypertension 11/28/2018     Priority: Medium     Mixed hyperlipidemia 11/28/2018     Priority: Medium     Balance problems 10/06/2015     Priority: Medium     Generalized weakness 10/06/2015     Priority: Medium     Controlled type 2 diabetes mellitus without complication, with long-term current use of insulin (H) 09/10/2015     Priority: Medium     History of tobacco use 09/10/2015     Priority: Medium     Low blood magnesium level 09/10/2015     Priority: Medium     Cough 11/28/2012     Priority: Medium     Inguinal hernia, right 04/19/2011     Priority: Medium     Diverticulosis of large intestine 10/18/2010     Priority: " Medium     Personal history of other diseases of digestive system 10/18/2010     Priority: Medium        Past Medical History:    Past Medical History:   Diagnosis Date     Essential (primary) hypertension      Hypomagnesemia      Other abnormalities of gait and mobility      Pure hypercholesterolemia      Type 2 diabetes mellitus with hyperglycemia (H)        Past Surgical History:    Past Surgical History:   Procedure Laterality Date     COLONOSCOPY  2010,F/U 2020  Ulcerative colitis       Family History:    History reviewed. No pertinent family history.    Social History:  Marital Status:   [2]  Social History     Tobacco Use     Smoking status: Former Smoker     Last attempt to quit: 1987     Years since quittin.7     Smokeless tobacco: Current User     Types: Snuff   Substance Use Topics     Alcohol use: Yes     Alcohol/week: 6.0 standard drinks     Drug use: No        Medications:    aspirin 81 MG chewable tablet  glipiZIDE (GLUCOTROL) 10 MG tablet  insulin glargine (LANTUS) 100 UNIT/ML injection  metFORMIN (GLUCOPHAGE) 1000 MG tablet  calcium & magnesium carbonates 311-232 MG TABS  losartan (COZAAR) 100 MG tablet  Multiple Vitamin (MULTI-VITAMINS) TABS  omeprazole (PRILOSEC) 20 MG CR capsule  simvastatin (ZOCOR) 20 MG tablet          Review of Systems   Constitutional: Negative for chills and fever.   HENT: Negative for congestion.    Eyes: Negative for visual disturbance.   Respiratory: Negative for chest tightness and shortness of breath.    Cardiovascular: Negative for chest pain.   Gastrointestinal: Negative for nausea and vomiting.   Genitourinary: Negative for dysuria.   Musculoskeletal: Negative for arthralgias.   Skin: Negative for rash.   Neurological: Negative for light-headedness.   Psychiatric/Behavioral: Positive for confusion. Negative for agitation.       Physical Exam   BP: (!) 156/84  Pulse: 83  Heart Rate: 78  Temp: 96.8  F (36  C)  Resp: 12  Height: 177.8 cm  "(5' 10\")  Weight: 89.4 kg (197 lb 1.5 oz)  SpO2: 96 %      Physical Exam  Vitals signs and nursing note reviewed.   Constitutional:       General: He is not in acute distress.     Appearance: He is well-developed. He is not toxic-appearing.   HENT:      Head: Normocephalic.   Eyes:      Conjunctiva/sclera: Conjunctivae normal.   Neck:      Musculoskeletal: Normal range of motion.   Cardiovascular:      Rate and Rhythm: Normal rate and regular rhythm.      Heart sounds: Normal heart sounds.   Pulmonary:      Effort: Pulmonary effort is normal.      Breath sounds: Normal breath sounds.   Abdominal:      General: Bowel sounds are normal.      Tenderness: There is no tenderness.   Skin:     General: Skin is warm and dry.   Neurological:      General: No focal deficit present.      Mental Status: He is alert.   Psychiatric:         Mood and Affect: Mood normal.         Behavior: Behavior normal.         ED Course        Procedures                 Results for orders placed or performed during the hospital encounter of 09/28/19 (from the past 24 hour(s))   CBC with platelets differential   Result Value Ref Range    WBC 8.6 4.0 - 11.0 10e9/L    RBC Count 4.41 4.4 - 5.9 10e12/L    Hemoglobin 13.9 13.3 - 17.7 g/dL    Hematocrit 40.6 40.0 - 53.0 %    MCV 92 78 - 100 fl    MCH 31.5 26.5 - 33.0 pg    MCHC 34.2 31.5 - 36.5 g/dL    RDW 12.0 10.0 - 15.0 %    Platelet Count 181 150 - 450 10e9/L    Diff Method Automated Method     % Neutrophils 78.7 %    % Lymphocytes 11.6 %    % Monocytes 5.8 %    % Eosinophils 3.3 %    % Basophils 0.3 %    % Immature Granulocytes 0.3 %    Absolute Neutrophil 6.8 1.6 - 8.3 10e9/L    Absolute Lymphocytes 1.0 0.8 - 5.3 10e9/L    Absolute Monocytes 0.5 0.0 - 1.3 10e9/L    Absolute Eosinophils 0.3 0.0 - 0.7 10e9/L    Absolute Basophils 0.0 0.0 - 0.2 10e9/L    Abs Immature Granulocytes 0.0 0 - 0.4 10e9/L   Basic metabolic panel   Result Value Ref Range    Sodium 133 (L) 134 - 144 mmol/L    Potassium 3.9 " 3.5 - 5.1 mmol/L    Chloride 99 98 - 107 mmol/L    Carbon Dioxide 25 21 - 31 mmol/L    Anion Gap 9 3 - 14 mmol/L    Glucose 87 70 - 105 mg/dL    Urea Nitrogen 14 7 - 25 mg/dL    Creatinine 0.83 0.70 - 1.30 mg/dL    GFR Estimate 89 >60 mL/min/[1.73_m2]    GFR Estimate If Black >90 >60 mL/min/[1.73_m2]    Calcium 9.4 8.6 - 10.3 mg/dL   *UA reflex to Microscopic   Result Value Ref Range    Color Urine Yellow     Appearance Urine Clear     Glucose Urine Negative NEG^Negative mg/dL    Bilirubin Urine Negative NEG^Negative    Ketones Urine Trace (A) NEG^Negative mg/dL    Specific Gravity Urine 1.015 1.000 - 1.030    Blood Urine Negative NEG^Negative    pH Urine 6.5 5.0 - 9.0 pH    Protein Albumin Urine Negative NEG^Negative mg/dL    Urobilinogen Urine 0.2 0.2 - 1.0 EU/dL    Nitrite Urine Negative NEG^Negative    Leukocyte Esterase Urine Negative NEG^Negative    Source Midstream Urine        Medications - No data to display    Assessments & Plan (with Medical Decision Making)     I have reviewed the nursing notes.    I have reviewed the findings, diagnosis, plan and need for follow up with the patient.  He is feeling back to his baseline.  Labs are very reassuring.  I suspect this was just an episode of hypoglycemia.  He will be discharged home at this time.  Return if worse.    New Prescriptions    No medications on file       Final diagnoses:   Hypoglycemia       9/28/2019   [facility]     Jeferson Mcgrath MD  09/28/19 2020

## 2019-12-13 ENCOUNTER — TELEPHONE (OUTPATIENT)
Dept: INTERNAL MEDICINE | Facility: OTHER | Age: 79
End: 2019-12-13

## 2019-12-13 NOTE — TELEPHONE ENCOUNTER
Left message for patient to call back to schedule an appointment for paperwork that needs to be filled out. Left message on daughter Nirmala's phone as well Provider would like daughter to attend the appointment with patient .  Regina Hernandez LPN on 12/13/2019 at 3:54 PM

## 2019-12-18 ENCOUNTER — HOSPITAL ENCOUNTER (OUTPATIENT)
Dept: GENERAL RADIOLOGY | Facility: OTHER | Age: 79
Discharge: HOME | End: 2019-12-18
Attending: INTERNAL MEDICINE | Admitting: INTERNAL MEDICINE
Payer: COMMERCIAL

## 2019-12-18 ENCOUNTER — OFFICE VISIT (OUTPATIENT)
Dept: INTERNAL MEDICINE | Facility: OTHER | Age: 79
End: 2019-12-18
Attending: INTERNAL MEDICINE
Payer: COMMERCIAL

## 2019-12-18 ENCOUNTER — TELEPHONE (OUTPATIENT)
Dept: INTERNAL MEDICINE | Facility: OTHER | Age: 79
End: 2019-12-18

## 2019-12-18 VITALS
WEIGHT: 174.13 LBS | DIASTOLIC BLOOD PRESSURE: 82 MMHG | OXYGEN SATURATION: 97 % | TEMPERATURE: 97.8 F | BODY MASS INDEX: 24.98 KG/M2 | SYSTOLIC BLOOD PRESSURE: 132 MMHG | RESPIRATION RATE: 16 BRPM | HEART RATE: 88 BPM

## 2019-12-18 DIAGNOSIS — R05.8 PRODUCTIVE COUGH: ICD-10-CM

## 2019-12-18 DIAGNOSIS — R05.8 PRODUCTIVE COUGH: Primary | ICD-10-CM

## 2019-12-18 DIAGNOSIS — Z72.0 CHEWING TOBACCO USE: ICD-10-CM

## 2019-12-18 PROCEDURE — 71046 X-RAY EXAM CHEST 2 VIEWS: CPT

## 2019-12-18 PROCEDURE — 99213 OFFICE O/P EST LOW 20 MIN: CPT | Performed by: INTERNAL MEDICINE

## 2019-12-18 ASSESSMENT — ENCOUNTER SYMPTOMS
VOMITING: 0
CONFUSION: 1
DIZZINESS: 0
CHILLS: 0
WHEEZING: 1
MYALGIAS: 0
ABDOMINAL PAIN: 0
DYSURIA: 0
SHORTNESS OF BREATH: 1
HEMATURIA: 0
WOUND: 0
DIARRHEA: 0
LIGHT-HEADEDNESS: 0
ARTHRALGIAS: 0
FEVER: 0
PALPITATIONS: 0
COUGH: 1
AGITATION: 0
NAUSEA: 0
BRUISES/BLEEDS EASILY: 0

## 2019-12-18 ASSESSMENT — ANXIETY QUESTIONNAIRES
7. FEELING AFRAID AS IF SOMETHING AWFUL MIGHT HAPPEN: NOT AT ALL
2. NOT BEING ABLE TO STOP OR CONTROL WORRYING: NOT AT ALL
IF YOU CHECKED OFF ANY PROBLEMS ON THIS QUESTIONNAIRE, HOW DIFFICULT HAVE THESE PROBLEMS MADE IT FOR YOU TO DO YOUR WORK, TAKE CARE OF THINGS AT HOME, OR GET ALONG WITH OTHER PEOPLE: NOT DIFFICULT AT ALL
6. BECOMING EASILY ANNOYED OR IRRITABLE: NOT AT ALL
5. BEING SO RESTLESS THAT IT IS HARD TO SIT STILL: NOT AT ALL
3. WORRYING TOO MUCH ABOUT DIFFERENT THINGS: NOT AT ALL
GAD7 TOTAL SCORE: 0
1. FEELING NERVOUS, ANXIOUS, OR ON EDGE: NOT AT ALL

## 2019-12-18 ASSESSMENT — PATIENT HEALTH QUESTIONNAIRE - PHQ9
SUM OF ALL RESPONSES TO PHQ QUESTIONS 1-9: 0
5. POOR APPETITE OR OVEREATING: NOT AT ALL

## 2019-12-18 ASSESSMENT — PAIN SCALES - GENERAL: PAINLEVEL: NO PAIN (0)

## 2019-12-18 NOTE — TELEPHONE ENCOUNTER
Spoke with patient and relayed message patient will be here at 1515. Pati Duran LPN .............12/18/2019  2:24 PM

## 2019-12-18 NOTE — TELEPHONE ENCOUNTER
He should probably be evaluated to make sure he does not have pneumonia.      I can see him this afternoon.   He could probably come to the clinic anytime now --- we can get a chest x-ray after he gets here.    Ace Salter MD

## 2019-12-18 NOTE — PATIENT INSTRUCTIONS
Chest xray today - concern for possible pneumonia.     --> Xray returned negative for acute infiltrate / pneumonia.     XR Chest 2 Views  Narrative: PROCEDURE:  XR CHEST 2 VW    HISTORY: Productive cough, .    COMPARISON:  6/14/2016    FINDINGS:  The cardiomediastinal contours are normal.  The trachea is midline.   There is calcific aortic atherosclerosis.  No focal consolidation, effusion or pneumothorax.  Slight interstitial  coarsening is chronic. Lung volumes are upper normal.  No suspicious osseous lesion or subdiaphragmatic free air.  Impression: IMPRESSION:      No new focal consolidation.      ENZO WELDON MD     Consider taking generic Mucinex DM --- to help with cough and phlegm.     Have a protein snack every evening.... to help prevent low blood sugars.     Return as needed for follow-up with Dr. Salter.    Clinic : [phone]  Appointment line: [phone]

## 2019-12-18 NOTE — NURSING NOTE
"Patient presents to the clinic for productive cough with white/clear sputum and chest congestion over the past couple of weeks.  [facility] all [facility].      Previous A1C is at goal of <8  Lab Results   Component Value Date    A1C 5.8 09/18/2019    A1C 6.7 11/28/2018     Urine microalbumin:creatine: n/a  Foot exam over a year ago-[facility] checks and patient declines today  Eye exam 1-9-19    Tobacco User no  Patient is on a daily aspirin  Patient is on a Statin.  Blood pressure today of:     BP Readings from Last 1 Encounters:   09/28/19 (!) 149/78      is not at the goal of <139/89 for diabetics.    Chief Complaint   Patient presents with     Cough       Initial BP (!) 142/82 (BP Location: Left arm, Patient Position: Sitting, Cuff Size: Adult Regular)   Pulse 88   Temp 97.8  F (36.6  C) (Tympanic)   Resp 16   Wt 79 kg (174 lb 2 oz)   SpO2 97%   BMI 24.98 kg/m   Estimated body mass index is 24.98 kg/m  as calculated from the following:    Height as of 9/28/19: 1.778 m (5' 10\").    Weight as of this encounter: 79 kg (174 lb 2 oz).  Medication Reconciliation: complete    Yesenia Trammell LPN    "

## 2019-12-18 NOTE — TELEPHONE ENCOUNTER
Called and confirmed last name and . Pt states that he is having a build up of mucus that he needs to cough up. He clears it and it comes back within an hour. Color of mucus is white to grey. Is having light headedness. Isn't out of breath when walking around.     Félix Gore LPN .......  2019  8:37 AM

## 2019-12-18 NOTE — PROGRESS NOTES
"Nursing Notes:   Yesenia Trammell LPN  12/18/2019  4:18 PM  Signed  Patient presents to the clinic for productive cough with white/clear sputum and chest congestion over the past couple of weeks.  [facility].      Previous A1C is at goal of <8  Lab Results   Component Value Date    A1C 5.8 09/18/2019    A1C 6.7 11/28/2018     Urine microalbumin:creatine: n/a  Foot exam over a year ago-[facility] checks and patient declines today  Eye exam 1-9-19    Tobacco User no  Patient is on a daily aspirin  Patient is on a Statin.  Blood pressure today of:     BP Readings from Last 1 Encounters:   09/28/19 (!) 149/78      is not at the goal of <139/89 for diabetics.    Chief Complaint   Patient presents with     Cough       Initial BP (!) 142/82 (BP Location: Left arm, Patient Position: Sitting, Cuff Size: Adult Regular)   Pulse 88   Temp 97.8  F (36.6  C) (Tympanic)   Resp 16   Wt 79 kg (174 lb 2 oz)   SpO2 97%   BMI 24.98 kg/m    Estimated body mass index is 24.98 kg/m  as calculated from the following:    Height as of 9/28/19: 1.778 m (5' 10\").    Weight as of this encounter: 79 kg (174 lb 2 oz).  Medication Reconciliation: complete    Yesenia Trammell LPN    Nursing note reviewed with patient.  Accuracy and completeness verified.   Mr. Ji is a 79 year old male who:  Patient presents with:  Cough      ICD-10-CM    1. Productive cough R05 XR Chest 2 Views   2. Chewing tobacco use Z72.0      HPI  Couple weeks of cough and phlegm. Yellow/grey phlegm. Some wheezing. Chest congestion. Has been getting a little better, per his wife, in past few days. Was worried about possible infection.     States that he still feels like he is having a lot of mid chest congestion.  Reports less cough recently.    Chest x-ray obtained, no infiltrate noted.    Does use chewing tobacco.  We discussed possibility of some tobacco juice microaspirations.  Wife states that he does need this from time to time.  Encouraged chewing " tobacco discontinuation.  States that he has been doing this for years he is not sure how easy that will be.    Functional Capacity: > or about 4 METS.   Review of Systems   Constitutional: Negative for chills and fever.   HENT: Negative for congestion and hearing loss.    Eyes: Negative for visual disturbance.   Respiratory: Positive for cough (  and phlegm), shortness of breath and wheezing.    Cardiovascular: Negative for chest pain and palpitations.   Gastrointestinal: Negative for abdominal pain, diarrhea, nausea and vomiting.   Endocrine: Negative for cold intolerance and heat intolerance.   Genitourinary: Negative for dysuria and hematuria.   Musculoskeletal: Positive for gait problem (+ some balance problems  ). Negative for arthralgias and myalgias.   Skin: Negative for rash and wound.   Allergic/Immunologic: Negative for immunocompromised state.   Neurological: Negative for dizziness and light-headedness.   Hematological: Does not bruise/bleed easily.   Psychiatric/Behavioral: Positive for confusion (  + memory loss). Negative for agitation.        Problem List/PMH: reviewed in EMR, and made relevant updates today.  Medications: reviewed in EMR, and made relevant updates today.  Allergies: reviewed in EMR, and made relevant updates today.  I reviewed family and social history and made relevant updates today.  Social History     Tobacco Use     Smoking status: Former Smoker     Last attempt to quit: 1987     Years since quittin.9     Smokeless tobacco: Current User     Types: Snuff   Substance Use Topics     Alcohol use: Yes     Alcohol/week: 6.0 standard drinks     Comment: 6 pack of beer per week     Drug use: No      Family History   Problem Relation Age of Onset     No Known Problems Mother      No Known Problems Father        EXAM:   Vitals:    19 1528   BP: 132/82   BP Location: Left arm   Patient Position: Sitting   Cuff Size: Adult Regular   Pulse: 88   Resp: 16   Temp: 97.8  F (36.6  " C)   TempSrc: Tympanic   SpO2: 97%   Weight: 79 kg (174 lb 2 oz)       Current Pain Score: No Pain (0)     BP Readings from Last 3 Encounters:   12/18/19 132/82   09/28/19 (!) 149/78   09/18/19 132/84      Wt Readings from Last 3 Encounters:   12/18/19 79 kg (174 lb 2 oz)   09/28/19 89.4 kg (197 lb 1.5 oz)   09/18/19 79.5 kg (175 lb 4 oz)      Estimated body mass index is 24.98 kg/m  as calculated from the following:    Height as of 9/28/19: 1.778 m (5' 10\").    Weight as of this encounter: 79 kg (174 lb 2 oz).     Physical Exam  Constitutional:       General: He is not in acute distress.     Appearance: He is well-developed. He is not diaphoretic.   HENT:      Head: Normocephalic and atraumatic.   Eyes:      General: No scleral icterus.     Conjunctiva/sclera: Conjunctivae normal.   Neck:      Musculoskeletal: Neck supple.   Cardiovascular:      Rate and Rhythm: Normal rate and regular rhythm.   Pulmonary:      Effort: Pulmonary effort is normal.      Breath sounds: Normal breath sounds. No wheezing or rhonchi.   Abdominal:      Palpations: Abdomen is soft.      Tenderness: There is no abdominal tenderness.   Musculoskeletal:         General: No deformity.   Lymphadenopathy:      Cervical: No cervical adenopathy.   Skin:     General: Skin is warm and dry.      Findings: No rash.   Neurological:      General: No focal deficit present.      Mental Status: He is alert.   Psychiatric:         Mood and Affect: Mood normal.         Behavior: Behavior normal.          Procedures   INVESTIGATIONS:  No results found for any visits on 12/18/19.    ASSESSMENT AND PLAN:  Problem List Items Addressed This Visit        Behavioral    Chewing tobacco use      Other Visit Diagnoses     Productive cough    -  Primary    Relevant Orders    XR Chest 2 Views (Completed)        reviewed diet, exercise and weight control, recommended sodium restriction  -- Expected clinical course discussed    -- Medications and their side effects " discussed    Patient Instructions   Chest xray today - concern for possible pneumonia.     --> Xray returned negative for acute infiltrate / pneumonia.     XR Chest 2 Views  Narrative: PROCEDURE:  XR CHEST 2 VW    HISTORY: Productive cough, .    COMPARISON:  6/14/2016    FINDINGS:  The cardiomediastinal contours are normal.  The trachea is midline.   There is calcific aortic atherosclerosis.  No focal consolidation, effusion or pneumothorax.  Slight interstitial  coarsening is chronic. Lung volumes are upper normal.  No suspicious osseous lesion or subdiaphragmatic free air.  Impression: IMPRESSION:      No new focal consolidation.      ENZO WELDON MD     Consider taking generic Mucinex DM --- to help with cough and phlegm.     Have a protein snack every evening.... to help prevent low blood sugars.     Return as needed for follow-up with Dr. Salter.    Clinic : [phone]  Appointment line: [phone]     Ace Salter MD  Internal Medicine  [facility]     Portions of this note were dictated using speech recognition software. The note has been proofread but errors in the text may have been overlooked. Please contact me if there are any concerns regarding the accuracy of the dictation.

## 2019-12-19 ASSESSMENT — ANXIETY QUESTIONNAIRES: GAD7 TOTAL SCORE: 0

## 2020-02-26 ENCOUNTER — TELEPHONE (OUTPATIENT)
Dept: INTERNAL MEDICINE | Facility: OTHER | Age: 80
End: 2020-02-26

## 2020-02-26 NOTE — TELEPHONE ENCOUNTER
DJS - patient is having trouble swallowing at times. Will not wait until next appt on March 16th with Joie. Wants to see him sooner and only him. Please call to advise.   Regina Tobar on 2/26/2020 at 3:15 PM

## 2020-02-26 NOTE — TELEPHONE ENCOUNTER
Go see one of the surgeons in surgery clinic... may need Esophageal Xray... swallow study... or possibly even an EGD.     Ace Salter MD

## 2020-02-27 NOTE — TELEPHONE ENCOUNTER
Spoke with patient's wife.  Dr. Salter's response was relayed and an appointment was made with Dr. Arshad on Monday 3.2.2020.  Chandni Gill on 2/27/2020 at 9:02 AM

## 2020-03-02 ENCOUNTER — HOSPITAL ENCOUNTER (OUTPATIENT)
Facility: OTHER | Age: 80
End: 2020-03-02
Attending: SURGERY | Admitting: SURGERY
Payer: COMMERCIAL

## 2020-03-02 ENCOUNTER — OFFICE VISIT (OUTPATIENT)
Dept: SURGERY | Facility: OTHER | Age: 80
End: 2020-03-02
Attending: SURGERY
Payer: COMMERCIAL

## 2020-03-02 ENCOUNTER — TELEPHONE (OUTPATIENT)
Dept: SURGERY | Facility: OTHER | Age: 80
End: 2020-03-02

## 2020-03-02 VITALS
BODY MASS INDEX: 24.97 KG/M2 | HEART RATE: 98 BPM | HEIGHT: 70 IN | SYSTOLIC BLOOD PRESSURE: 140 MMHG | RESPIRATION RATE: 22 BRPM | TEMPERATURE: 96.6 F | DIASTOLIC BLOOD PRESSURE: 100 MMHG | WEIGHT: 174.4 LBS

## 2020-03-02 DIAGNOSIS — R13.10 ODYNOPHAGIA: Primary | ICD-10-CM

## 2020-03-02 PROCEDURE — 99204 OFFICE O/P NEW MOD 45 MIN: CPT | Performed by: SURGERY

## 2020-03-02 ASSESSMENT — MIFFLIN-ST. JEOR: SCORE: 1502.32

## 2020-03-02 ASSESSMENT — PAIN SCALES - GENERAL: PAINLEVEL: NO PAIN (0)

## 2020-03-02 NOTE — TELEPHONE ENCOUNTER
Screening Questions for the Scheduling of Screening Colonoscopies   (If Colonoscopy is diagnostic, Provider should review the chart before scheduling.)  Are you younger than 50 or older than 80?  YES   Do you take aspirin or fish oil?  YES - ASPIRIN   (if yes, tell patient to stop 1 week prior to Colonoscopy)  Do you take warfarin (Coumadin), clopidogrel (Plavix), apixaban (Eliquis), dabigatram (Pradaxa), rivaroxaban (Xarelto) or any blood thinner? NO   Do you use oxygen at home?  NO   Do you have kidney disease? NO   Are you on dialysis? NO   Have you had a stroke or heart attack in the last year? NO   Have you had a stent in your heart or any blood vessel in the last year? NO   Have you had a transplant of any organ? NO   Have you had a colonoscopy or upper endoscopy (EGD) before? NO          When?    Date of scheduled 03/20/2020  Provider [facility]   Pharmacy

## 2020-03-02 NOTE — PROGRESS NOTES
GENERAL SURGERY CONSULTATION NOTE    Vikas Ji   [address] [address] [address]  80 year old  male    Primary Care Provider:  Ace Salter      HPI: Vikas Ji presents to clinic with odynophagia and constipation. He states he is treating constipation with increased water intake and senna as needed. He is having 1-2 BM's per day and is satisfied with this. He denies thin caliber stools and blood in stools. He has lost weight, ~23lbs over the past 5 months. He states he is not eating as much as he used too because he is not as active. Last colonoscopy was 2010- ?ulcerative colitis. He states he has had pain with swallowing now for the past 1 year. He says this is only when he is eating. He denies classic reflux symptoms such as substernal burning chest pain, water brash and globus sensation. He denies symptoms at night or when supine. He states it feels like food goes through slow and burns. He denies regurgitation. Sometimes he has to drink water to help things get through. He states this has not gotten worse. He has used chewing tobacco regularly since he was a very young man.       REVIEW OF SYSTEMS:    GENERAL: No fevers or chills. Positive fatigue, recent weight loss.  HEENT: No sinus drainage. No changes with vision or hearing. No difficulty swallowing.   LYMPHATICS:  No swollen nodes in axilla, neck or groin.  CARDIOVASCULAR: Denies chest pain, palpitations and dyspnea on exertion.  PULMONARY: No shortness of breath or cough. No increase in sputum production.  GI: Denies melena,bright red blood in stools. No hematemesis. No constipation or diarrhea.  : No dysuria or hematuria. Increased trouble with initiating urination   SKIN: No recent rashes or ulcers.   HEMATOLOGY:  No history of easy bruising or bleeding.  ENDOCRINE:  No history of diabetes or thyroid problems.  NEUROLOGY:  No history of seizures or headaches. No motor or sensory changes.        Patient Active Problem List    Diagnosis     Balance problems     Cough     Controlled type 2 diabetes mellitus without complication, with long-term current use of insulin (H)     Diverticulosis of large intestine     Generalized weakness     History of tobacco use     Inguinal hernia, right     Low blood magnesium level     Personal history of other diseases of digestive system     Benign essential hypertension     Mixed hyperlipidemia     Vitamin B12 deficiency     Memory problem     Chewing tobacco use       Past Medical History:   Diagnosis Date     Hypomagnesemia     9/10/2015     Other abnormalities of gait and mobility     10/6/2015     Type 2 diabetes mellitus with hyperglycemia (H)     9/10/2015       Past Surgical History:   Procedure Laterality Date     COLONOSCOPY  2010,F/U   Ulcerative colitis       Family History   Problem Relation Age of Onset     No Known Problems Mother      No Known Problems Father        Social History     Social History Narrative    , lives with his wife, remains active       Social History     Socioeconomic History     Marital status:      Spouse name: Not on file     Number of children: Not on file     Years of education: Not on file     Highest education level: Not on file   Occupational History     Not on file   Social Needs     Financial resource strain: Not on file     Food insecurity:     Worry: Not on file     Inability: Not on file     Transportation needs:     Medical: Not on file     Non-medical: Not on file   Tobacco Use     Smoking status: Former Smoker     Last attempt to quit: 1987     Years since quittin.1     Smokeless tobacco: Current User     Types: Snuff   Substance and Sexual Activity     Alcohol use: Yes     Alcohol/week: 6.0 standard drinks     Comment: 6 pack of beer per week     Drug use: No     Sexual activity: Not Currently   Lifestyle     Physical activity:     Days per week: Not on file     Minutes per session: Not on file     Stress: Not  "on file   Relationships     Social connections:     Talks on phone: Not on file     Gets together: Not on file     Attends Jain service: Not on file     Active member of club or organization: Not on file     Attends meetings of clubs or organizations: Not on file     Relationship status: Not on file     Intimate partner violence:     Fear of current or ex partner: Not on file     Emotionally abused: Not on file     Physically abused: Not on file     Forced sexual activity: Not on file   Other Topics Concern     Parent/sibling w/ CABG, MI or angioplasty before 65F 55M? Not Asked   Social History Narrative    , lives with his wife, remains active       aspirin 81 MG chewable tablet, Take 81 mg by mouth once  calcium & magnesium carbonates 311-232 MG TABS, Take 3 tablets by mouth daily  glipiZIDE (GLUCOTROL) 10 MG tablet, Take 1 tablet by mouth 2 times daily (before meals) In AM and PM -- and add 1/2 tablet daily with Lunch.  insulin glargine (LANTUS) 100 UNIT/ML injection, Inject 45 Units Subcutaneous every morning (before breakfast)  losartan (COZAAR) 100 MG tablet, Take 1 tablet by mouth daily  metFORMIN (GLUCOPHAGE) 1000 MG tablet, Take 1 tablet by mouth 2 times daily (with meals)  Multiple Vitamin (MULTI-VITAMINS) TABS, Take 2 tablets by mouth daily  omeprazole (PRILOSEC) 20 MG CR capsule, Take 1 capsule by mouth daily  simvastatin (ZOCOR) 20 MG tablet, Take 1 tablet by mouth daily (with dinner) Patient started taking 20 mg 11/25/15.    No current facility-administered medications on file prior to visit.         ALLERGIES/SENSITIVITIES: No Known Allergies    PHYSICAL EXAM:     BP (!) 140/100 (BP Location: Right arm, Patient Position: Sitting)   Pulse 98   Temp 96.6  F (35.9  C) (Tympanic)   Resp 22   Ht 1.778 m (5' 10\")   Wt 79.1 kg (174 lb 6.4 oz)   BMI 25.02 kg/m      General Appearance:   Sitting up in the chair, no apparent distress  HEENT: Pupils are equal and reactive, no scleral icterus, "   Heart & CV:  RRR no murmur.  Intact distal pulses, good cap refill.  LUNGS: No increased work of breathing.Lugns are CTA B/L, no wheezing or crackles.  Abd:  Soft, non-tender, non-distended   Ext: normal bulk and tone, no lower extremity edema   Neuro: alert and oriented, normal speech and mentation         CONSULTATION ASSESSMENT AND PLAN:    80 year old male with odynophagia and possible dysphagia. This may represent atypical reflux symptoms. He may also have a primary esophogeal problem such as web, ring, mass or esophogeal motility disorder.  Mass is especially concerning given history of chewing tobacco use and weight loss.     Xray esophagram  Upper GI endoscopy         Eliseo Arshad MD on 3/2/2020 at 10:26 AM

## 2020-03-02 NOTE — NURSING NOTE
"Chief Complaint   Patient presents with     Consult     difficulty swallowing       Initial BP (!) 140/100 (BP Location: Right arm, Patient Position: Sitting, Cuff Size: Adult Large)   Pulse 98   Temp 96.6  F (35.9  C) (Tympanic)   Resp 22   Ht 1.778 m (5' 10\")   Wt 79.1 kg (174 lb 6.4 oz)   BMI 25.02 kg/m   Estimated body mass index is 25.02 kg/m  as calculated from the following:    Height as of this encounter: 1.778 m (5' 10\").    Weight as of this encounter: 79.1 kg (174 lb 6.4 oz).  Medication Reconciliation: complete    Virginia Umana LPN  "

## 2020-03-04 ENCOUNTER — TELEPHONE (OUTPATIENT)
Dept: INTERNAL MEDICINE | Facility: OTHER | Age: 80
End: 2020-03-04

## 2020-03-04 NOTE — TELEPHONE ENCOUNTER
Patient stopped by with questions regarding the EGD procedure he was scheduled for.  He would like to do a colonoscopy versus the EGD.  In a previous phone note it stated that he was having swallowing issues.  He stated that he is not having any swallowing issues and feels like his issues are from the other end.  He canceled the EGD and would like a new referral for the colonoscopy.  He is aware that Dr. Salter is out of the office until next week.  They are okay to wait until then for a response.  Chandni Gill on 3/4/2020 at 10:08 AM

## 2020-03-10 NOTE — TELEPHONE ENCOUNTER
Have him schedule appointment in surgery clinic to discuss his issues  And they can decide on best test to order.     Ace Salter MD

## 2020-03-10 NOTE — TELEPHONE ENCOUNTER
Patient was called by general surgery schedulers and an appointment was made with Dr. Borges for 3.11.2020 @ 3:30pm to discuss scopes.  Chandni Gill on 3/10/2020 at 12:21 PM

## 2020-03-11 ENCOUNTER — OFFICE VISIT (OUTPATIENT)
Dept: SURGERY | Facility: OTHER | Age: 80
End: 2020-03-11
Attending: SURGERY
Payer: COMMERCIAL

## 2020-03-11 VITALS
DIASTOLIC BLOOD PRESSURE: 72 MMHG | TEMPERATURE: 96.9 F | SYSTOLIC BLOOD PRESSURE: 134 MMHG | WEIGHT: 176.6 LBS | BODY MASS INDEX: 25.34 KG/M2 | RESPIRATION RATE: 16 BRPM | HEART RATE: 84 BPM

## 2020-03-11 DIAGNOSIS — R41.3 MEMORY PROBLEM: ICD-10-CM

## 2020-03-11 DIAGNOSIS — R13.19 OTHER DYSPHAGIA: ICD-10-CM

## 2020-03-11 DIAGNOSIS — R19.4 CHANGE IN BOWEL HABITS: Primary | ICD-10-CM

## 2020-03-11 DIAGNOSIS — R39.198 DIFFICULTY URINATING: ICD-10-CM

## 2020-03-11 PROCEDURE — 99212 OFFICE O/P EST SF 10 MIN: CPT | Performed by: SURGERY

## 2020-03-11 RX ORDER — ONDANSETRON 2 MG/ML
4 INJECTION INTRAMUSCULAR; INTRAVENOUS
Status: CANCELLED | OUTPATIENT
Start: 2020-03-11

## 2020-03-11 RX ORDER — LIDOCAINE 40 MG/G
CREAM TOPICAL
Status: CANCELLED | OUTPATIENT
Start: 2020-03-11

## 2020-03-11 RX ORDER — SODIUM CHLORIDE, SODIUM LACTATE, POTASSIUM CHLORIDE, CALCIUM CHLORIDE 600; 310; 30; 20 MG/100ML; MG/100ML; MG/100ML; MG/100ML
INJECTION, SOLUTION INTRAVENOUS CONTINUOUS
Status: CANCELLED | OUTPATIENT
Start: 2020-03-11

## 2020-03-11 RX ORDER — BISACODYL 5 MG/1
TABLET, DELAYED RELEASE ORAL
Qty: 2 TABLET | Refills: 0 | Status: SHIPPED | OUTPATIENT
Start: 2020-03-11 | End: 2020-07-30

## 2020-03-11 RX ORDER — POLYETHYLENE GLYCOL 3350, SODIUM CHLORIDE, SODIUM BICARBONATE, POTASSIUM CHLORIDE 420; 11.2; 5.72; 1.48 G/4L; G/4L; G/4L; G/4L
POWDER, FOR SOLUTION ORAL
Qty: 4000 ML | Refills: 0 | Status: SHIPPED | OUTPATIENT
Start: 2020-03-11 | End: 2020-07-30

## 2020-03-11 ASSESSMENT — PAIN SCALES - GENERAL: PAINLEVEL: NO PAIN (0)

## 2020-03-11 NOTE — PROGRESS NOTES
Surgical Clinic Consult  Referring physician:  Ace Salter   Primary physician:     Ace Salter    Chief complaint:   Discuss EGD/colonoscopy    History of present illness:  This is a 80 year old male I am seeing in consultation for discussing EGD/colonoscopy.  The patient's last colonoscopy was in 2010.  He has not had upper endoscopy in the past.  He has 3 separate issues.  His main complaint is difficulty urinating.  He wakes up a couple of times at night depending on how much beer he drinks.  He is noticed a change in his bowel habits.  In the past he moved his bowels daily and regularly.  They have become slightly more irregular.  He is taken some senna laxatives and then had diarrhea.  Lastly, he has had some difficulty with swallowing.  There is been no pain with swallowing but food does get caught.  It is never resulted in vomiting.  Food is always made it down.  He has lost some weight.  He chews tobacco daily.  He has a difficult historian as he rapidly forgets what we just discussed.     Past medical history:   Past Medical History:   Diagnosis Date     Hypomagnesemia     9/10/2015     Other abnormalities of gait and mobility     10/6/2015     Type 2 diabetes mellitus with hyperglycemia (H)     9/10/2015       Pastsurgical history:  Past Surgical History:   Procedure Laterality Date     COLONOSCOPY  09/27/2010 2010,F/U 2020  Ulcerative colitis       Current medications:  Current Outpatient Medications   Medication Sig Dispense Refill     bisacodyl (DULCOLAX) 5 MG EC tablet Take two tablets at noon day prior to colonoscopy 2 tablet 0     polyethylene glycol-electrolytes (NULYTELY WITH FLAVOR PACKS) 420 g solution Take 250 mL every 10 minutes the day prior to colonoscopy 4000 mL 0     aspirin 81 MG chewable tablet Take 81 mg by mouth once       calcium & magnesium carbonates 311-232 MG TABS Take 3 tablets by mouth daily       glipiZIDE (GLUCOTROL) 10 MG tablet Take 1 tablet by mouth 2 times daily  (before meals) In AM and PM -- and add 1/2 tablet daily with Lunch.       insulin glargine (LANTUS) 100 UNIT/ML injection Inject 45 Units Subcutaneous every morning (before breakfast)       losartan (COZAAR) 100 MG tablet Take 1 tablet by mouth daily       metFORMIN (GLUCOPHAGE) 1000 MG tablet Take 1 tablet by mouth 2 times daily (with meals)       Multiple Vitamin (MULTI-VITAMINS) TABS Take 2 tablets by mouth daily       omeprazole (PRILOSEC) 20 MG CR capsule Take 1 capsule by mouth daily       simvastatin (ZOCOR) 20 MG tablet Take 1 tablet by mouth daily (with dinner) Patient started taking 20 mg 11/25/15.         Allergies:  No Known Allergies    Family history:  Family History   Problem Relation Age of Onset     No Known Problems Mother      No Known Problems Father        Social history:  Social History     Socioeconomic History     Marital status:      Spouse name: Not on file     Number of children: Not on file     Years of education: Not on file     Highest education level: Not on file   Occupational History     Not on file   Social Needs     Financial resource strain: Not on file     Food insecurity     Worry: Not on file     Inability: Not on file     Transportation needs     Medical: Not on file     Non-medical: Not on file   Tobacco Use     Smoking status: Former Smoker     Last attempt to quit: 1987     Years since quittin.2     Smokeless tobacco: Current User     Types: Snuff   Substance and Sexual Activity     Alcohol use: Yes     Alcohol/week: 6.0 standard drinks     Comment: 6 pack of beer per week     Drug use: No     Sexual activity: Not Currently   Lifestyle     Physical activity     Days per week: Not on file     Minutes per session: Not on file     Stress: Not on file   Relationships     Social connections     Talks on phone: Not on file     Gets together: Not on file     Attends Sikh service: Not on file     Active member of club or organization: Not on file     Attends  meetings of clubs or organizations: Not on file     Relationship status: Not on file     Intimate partner violence     Fear of current or ex partner: Not on file     Emotionally abused: Not on file     Physically abused: Not on file     Forced sexual activity: Not on file   Other Topics Concern     Parent/sibling w/ CABG, MI or angioplasty before 65F 55M? Not Asked   Social History Narrative    , lives with his wife, remains active       PROBLEM LIST:  Patient Active Problem List   Diagnosis     Balance problems     Cough     Controlled type 2 diabetes mellitus without complication, with long-term current use of insulin (H)     Diverticulosis of large intestine     Generalized weakness     History of tobacco use     Inguinal hernia, right     Low blood magnesium level     Personal history of other diseases of digestive system     Benign essential hypertension     Mixed hyperlipidemia     Vitamin B12 deficiency     Memory problem     Chewing tobacco use     Change in bowel habits     Other dysphagia     Difficulty urinating     Review of systems:  COMPLETE REVIEW OF SYSTEMS: Weight loss  Gastrointestinal: Change in bowel habits reflux constipation and difficulty swallowing  Cardiovascular: Denies problems  Respiratory: Shortness of breath and pain with breathing  Genitourinary: Difficulty urinating  Musculoskeletal: Joint pain  Skin: Denies problems  Neurologic: Denies problems  Psychiatric: Anxiety  Endocrine: Diabetes mellitus and cold intolerance  Heme/Lymphatic: Denies problems  Vascular: Leg pain with walking      Physical exam: /72 (BP Location: Right arm, Patient Position: Sitting, Cuff Size: Adult Regular)   Pulse 84   Temp 96.9  F (36.1  C) (Tympanic)   Resp 16   Wt 80.1 kg (176 lb 9.6 oz)   BMI 25.34 kg/m        General: this is a pleasant male patient in no acute distress.  Patient is awake alert and oriented x3 .   Respiratory: Clear without wheezes  Cardiovascular: Regular rate and  rhythm  Abdominal: Soft and nontender.  Reducible bulge in the right inguinal area.  Some discomfort in left inguinal area but no bulging.   Rectal: Deferred to colonoscopy    Assessment:   1.  Difficulty urinating, likely a prostate issue  2.  Change in bowel habits  3.  Dysphasia  4.  Short-term memory loss    Plan:    1.  Consult urology Dr. Carty  2.  EGD/colonoscopy.  Risks of bleeding, perforation, incomplete examination, aspiration discussed and wishes to proceed. MAC needed for age, ASA III.   3.  Recommend work-up of memory loss      Russ Borges MD FACS

## 2020-03-11 NOTE — NURSING NOTE
"Chief Complaint   Patient presents with     Consult     discuss EGD/ Colonoscopy.        Initial /72 (BP Location: Right arm, Patient Position: Sitting, Cuff Size: Adult Regular)   Pulse 84   Temp 96.9  F (36.1  C) (Tympanic)   Resp 16   Wt 80.1 kg (176 lb 9.6 oz)   BMI 25.34 kg/m   Estimated body mass index is 25.34 kg/m  as calculated from the following:    Height as of 3/2/20: 1.778 m (5' 10\").    Weight as of this encounter: 80.1 kg (176 lb 9.6 oz).  Medication Reconciliation: Completed     Christine Lew LPN  "

## 2020-03-12 ENCOUNTER — TELEPHONE (OUTPATIENT)
Dept: SURGERY | Facility: OTHER | Age: 80
End: 2020-03-12

## 2020-03-12 ENCOUNTER — HOSPITAL ENCOUNTER (OUTPATIENT)
Facility: OTHER | Age: 80
End: 2020-03-12
Attending: SURGERY | Admitting: SURGERY
Payer: COMMERCIAL

## 2020-03-30 ENCOUNTER — TELEPHONE (OUTPATIENT)
Dept: INTERNAL MEDICINE | Facility: OTHER | Age: 80
End: 2020-03-30

## 2020-03-30 DIAGNOSIS — E11.9 CONTROLLED TYPE 2 DIABETES MELLITUS WITHOUT COMPLICATION, WITH LONG-TERM CURRENT USE OF INSULIN (H): Primary | ICD-10-CM

## 2020-03-30 DIAGNOSIS — Z79.4 CONTROLLED TYPE 2 DIABETES MELLITUS WITHOUT COMPLICATION, WITH LONG-TERM CURRENT USE OF INSULIN (H): Primary | ICD-10-CM

## 2020-03-30 DIAGNOSIS — E78.2 MIXED HYPERLIPIDEMIA: ICD-10-CM

## 2020-03-30 DIAGNOSIS — I10 BENIGN ESSENTIAL HYPERTENSION: ICD-10-CM

## 2020-03-30 DIAGNOSIS — R12 HEARTBURN: ICD-10-CM

## 2020-03-31 RX ORDER — LOSARTAN POTASSIUM 100 MG/1
100 TABLET ORAL DAILY
Qty: 90 TABLET | Refills: 3 | Status: SHIPPED | OUTPATIENT
Start: 2020-03-31 | End: 2020-09-11

## 2020-03-31 RX ORDER — SIMVASTATIN 40 MG
40 TABLET ORAL AT BEDTIME
Qty: 90 TABLET | Refills: 3 | Status: SHIPPED | OUTPATIENT
Start: 2020-03-31 | End: 2020-09-11

## 2020-03-31 RX ORDER — GLIPIZIDE 10 MG/1
10 TABLET ORAL
Qty: 180 TABLET | Refills: 3 | Status: SHIPPED | OUTPATIENT
Start: 2020-03-31 | End: 2020-08-19

## 2020-05-07 ENCOUNTER — HOSPITAL ENCOUNTER (EMERGENCY)
Facility: OTHER | Age: 80
Discharge: HOME | End: 2020-05-07
Attending: PHYSICIAN ASSISTANT | Admitting: PHYSICIAN ASSISTANT
Payer: COMMERCIAL

## 2020-05-07 ENCOUNTER — APPOINTMENT (OUTPATIENT)
Dept: ULTRASOUND IMAGING | Facility: OTHER | Age: 80
End: 2020-05-07
Attending: PHYSICIAN ASSISTANT
Payer: COMMERCIAL

## 2020-05-07 ENCOUNTER — APPOINTMENT (OUTPATIENT)
Dept: CT IMAGING | Facility: OTHER | Age: 80
End: 2020-05-07
Attending: PHYSICIAN ASSISTANT
Payer: COMMERCIAL

## 2020-05-07 VITALS
OXYGEN SATURATION: 96 % | SYSTOLIC BLOOD PRESSURE: 161 MMHG | HEIGHT: 65 IN | TEMPERATURE: 98.2 F | RESPIRATION RATE: 18 BRPM | HEART RATE: 72 BPM | DIASTOLIC BLOOD PRESSURE: 78 MMHG | WEIGHT: 168 LBS | BODY MASS INDEX: 27.99 KG/M2

## 2020-05-07 DIAGNOSIS — N39.0 URINARY TRACT INFECTION: ICD-10-CM

## 2020-05-07 DIAGNOSIS — R10.84 ABDOMINAL PAIN, GENERALIZED: ICD-10-CM

## 2020-05-07 LAB
ALBUMIN SERPL-MCNC: 4.5 G/DL (ref 3.5–5.7)
ALBUMIN UR-MCNC: 30 MG/DL
ALP SERPL-CCNC: 42 U/L (ref 34–104)
ALT SERPL W P-5'-P-CCNC: 13 U/L (ref 7–52)
ANION GAP SERPL CALCULATED.3IONS-SCNC: 8 MMOL/L (ref 3–14)
APPEARANCE UR: ABNORMAL
AST SERPL W P-5'-P-CCNC: 20 U/L (ref 13–39)
BACTERIA #/AREA URNS HPF: ABNORMAL /HPF
BASOPHILS # BLD AUTO: 0.1 10E9/L (ref 0–0.2)
BASOPHILS NFR BLD AUTO: 0.9 %
BILIRUB SERPL-MCNC: 0.7 MG/DL (ref 0.3–1)
BILIRUB UR QL STRIP: NEGATIVE
BUN SERPL-MCNC: 16 MG/DL (ref 7–25)
CALCIUM SERPL-MCNC: 9.9 MG/DL (ref 8.6–10.3)
CHLORIDE SERPL-SCNC: 103 MMOL/L (ref 98–107)
CO2 SERPL-SCNC: 28 MMOL/L (ref 21–31)
COLOR UR AUTO: YELLOW
CREAT SERPL-MCNC: 0.86 MG/DL (ref 0.7–1.3)
DIFFERENTIAL METHOD BLD: NORMAL
EOSINOPHIL # BLD AUTO: 0.1 10E9/L (ref 0–0.7)
EOSINOPHIL NFR BLD AUTO: 1.8 %
ERYTHROCYTE [DISTWIDTH] IN BLOOD BY AUTOMATED COUNT: 12.6 % (ref 10–15)
GFR SERPL CREATININE-BSD FRML MDRD: 85 ML/MIN/{1.73_M2}
GLUCOSE SERPL-MCNC: 114 MG/DL (ref 70–105)
GLUCOSE UR STRIP-MCNC: NEGATIVE MG/DL
HCT VFR BLD AUTO: 43.2 % (ref 40–53)
HGB BLD-MCNC: 14.4 G/DL (ref 13.3–17.7)
HGB UR QL STRIP: ABNORMAL
IMM GRANULOCYTES # BLD: 0 10E9/L (ref 0–0.4)
IMM GRANULOCYTES NFR BLD: 0.3 %
KETONES UR STRIP-MCNC: 10 MG/DL
LACTATE BLD-SCNC: 1 MMOL/L (ref 0.7–2)
LEUKOCYTE ESTERASE UR QL STRIP: ABNORMAL
LIPASE SERPL-CCNC: 7 U/L (ref 11–82)
LYMPHOCYTES # BLD AUTO: 1.1 10E9/L (ref 0.8–5.3)
LYMPHOCYTES NFR BLD AUTO: 16.9 %
MCH RBC QN AUTO: 30.6 PG (ref 26.5–33)
MCHC RBC AUTO-ENTMCNC: 33.3 G/DL (ref 31.5–36.5)
MCV RBC AUTO: 92 FL (ref 78–100)
MONOCYTES # BLD AUTO: 0.4 10E9/L (ref 0–1.3)
MONOCYTES NFR BLD AUTO: 6.2 %
MUCOUS THREADS #/AREA URNS LPF: PRESENT /LPF
NEUTROPHILS # BLD AUTO: 4.9 10E9/L (ref 1.6–8.3)
NEUTROPHILS NFR BLD AUTO: 73.9 %
NITRATE UR QL: POSITIVE
PH UR STRIP: 7 PH (ref 5–7)
PLATELET # BLD AUTO: 219 10E9/L (ref 150–450)
POTASSIUM SERPL-SCNC: 4.5 MMOL/L (ref 3.5–5.1)
PROT SERPL-MCNC: 7.1 G/DL (ref 6.4–8.9)
RBC # BLD AUTO: 4.7 10E12/L (ref 4.4–5.9)
RBC #/AREA URNS AUTO: 28 /HPF (ref 0–2)
SODIUM SERPL-SCNC: 139 MMOL/L (ref 134–144)
SOURCE: ABNORMAL
SP GR UR STRIP: 1.02 (ref 1–1.03)
UROBILINOGEN UR STRIP-MCNC: NORMAL MG/DL (ref 0–2)
WBC # BLD AUTO: 6.6 10E9/L (ref 4–11)
WBC #/AREA URNS AUTO: >182 /HPF (ref 0–5)
WBC CLUMPS #/AREA URNS HPF: PRESENT /HPF

## 2020-05-07 PROCEDURE — 99284 EMERGENCY DEPT VISIT MOD MDM: CPT | Mod: Z6 | Performed by: PHYSICIAN ASSISTANT

## 2020-05-07 PROCEDURE — 96365 THER/PROPH/DIAG IV INF INIT: CPT | Mod: XU | Performed by: PHYSICIAN ASSISTANT

## 2020-05-07 PROCEDURE — 74177 CT ABD & PELVIS W/CONTRAST: CPT

## 2020-05-07 PROCEDURE — 25000128 H RX IP 250 OP 636: Performed by: PHYSICIAN ASSISTANT

## 2020-05-07 PROCEDURE — 81001 URINALYSIS AUTO W/SCOPE: CPT | Performed by: PHYSICIAN ASSISTANT

## 2020-05-07 PROCEDURE — 87088 URINE BACTERIA CULTURE: CPT | Performed by: PHYSICIAN ASSISTANT

## 2020-05-07 PROCEDURE — 85025 COMPLETE CBC W/AUTO DIFF WBC: CPT | Performed by: PHYSICIAN ASSISTANT

## 2020-05-07 PROCEDURE — 36415 COLL VENOUS BLD VENIPUNCTURE: CPT | Performed by: PHYSICIAN ASSISTANT

## 2020-05-07 PROCEDURE — 76705 ECHO EXAM OF ABDOMEN: CPT

## 2020-05-07 PROCEDURE — 25800030 ZZH RX IP 258 OP 636: Performed by: PHYSICIAN ASSISTANT

## 2020-05-07 PROCEDURE — 25500064 ZZH RX 255 OP 636: Performed by: PHYSICIAN ASSISTANT

## 2020-05-07 PROCEDURE — 80053 COMPREHEN METABOLIC PANEL: CPT | Performed by: PHYSICIAN ASSISTANT

## 2020-05-07 PROCEDURE — 99285 EMERGENCY DEPT VISIT HI MDM: CPT | Mod: 25 | Performed by: PHYSICIAN ASSISTANT

## 2020-05-07 PROCEDURE — 87086 URINE CULTURE/COLONY COUNT: CPT | Performed by: PHYSICIAN ASSISTANT

## 2020-05-07 PROCEDURE — 83605 ASSAY OF LACTIC ACID: CPT | Performed by: PHYSICIAN ASSISTANT

## 2020-05-07 PROCEDURE — 96366 THER/PROPH/DIAG IV INF ADDON: CPT | Performed by: PHYSICIAN ASSISTANT

## 2020-05-07 PROCEDURE — 83690 ASSAY OF LIPASE: CPT | Performed by: PHYSICIAN ASSISTANT

## 2020-05-07 RX ORDER — CEPHALEXIN 500 MG/1
500 CAPSULE ORAL 4 TIMES DAILY
Qty: 40 CAPSULE | Refills: 0 | Status: SHIPPED | OUTPATIENT
Start: 2020-05-07 | End: 2020-06-23

## 2020-05-07 RX ORDER — CEPHALEXIN 500 MG/1
500 CAPSULE ORAL 4 TIMES DAILY
Qty: 40 CAPSULE | Refills: 0 | Status: SHIPPED | OUTPATIENT
Start: 2020-05-07 | End: 2020-05-07

## 2020-05-07 RX ORDER — ONDANSETRON 2 MG/ML
4 INJECTION INTRAMUSCULAR; INTRAVENOUS ONCE
Status: COMPLETED | OUTPATIENT
Start: 2020-05-07 | End: 2020-05-07

## 2020-05-07 RX ORDER — CEFTRIAXONE SODIUM 1 G/50ML
1 INJECTION, SOLUTION INTRAVENOUS ONCE
Status: COMPLETED | OUTPATIENT
Start: 2020-05-07 | End: 2020-05-07

## 2020-05-07 RX ADMIN — ONDANSETRON 4 MG: 2 INJECTION INTRAMUSCULAR; INTRAVENOUS at 11:48

## 2020-05-07 RX ADMIN — IOHEXOL 100 ML: 350 INJECTION, SOLUTION INTRAVENOUS at 14:01

## 2020-05-07 RX ADMIN — SODIUM CHLORIDE 1000 ML: 9 INJECTION, SOLUTION INTRAVENOUS at 11:50

## 2020-05-07 RX ADMIN — CEFTRIAXONE SODIUM 1 G: 1 INJECTION, SOLUTION INTRAVENOUS at 14:10

## 2020-05-07 ASSESSMENT — ENCOUNTER SYMPTOMS
BACK PAIN: 0
FEVER: 0
CONFUSION: 0
ADENOPATHY: 0
ABDOMINAL PAIN: 1
NAUSEA: 1
HEMATURIA: 0
CHEST TIGHTNESS: 0
WOUND: 0
CHILLS: 0
SHORTNESS OF BREATH: 0
VOMITING: 0
BRUISES/BLEEDS EASILY: 0

## 2020-05-07 ASSESSMENT — MIFFLIN-ST. JEOR: SCORE: 1393.92

## 2020-05-07 NOTE — ED AVS SNAPSHOT
[facility]  [address]  [place] [address]  Phone:  [phone]  Fax:  [phone]                                    Vikas Ji   MRN: [id number]    Department:  [facility]   Date of Visit:  5/7/2020           After Visit Summary Signature Page    I have received my discharge instructions, and my questions have been answered. I have discussed any challenges I see with this plan with the nurse or doctor.    ..........................................................................................................................................  Patient/Patient Representative Signature      ..........................................................................................................................................  Patient Representative Print Name and Relationship to Patient    ..................................................               ................................................  Date                                   Time    ..........................................................................................................................................  Reviewed by Signature/Title    ...................................................              ..............................................  Date                                               Time          22EPIC Rev 08/18

## 2020-05-07 NOTE — DISCHARGE INSTRUCTIONS
Get plenty of fluids and rest.  Take your antibiotic as directed.  The rest of your lab work and imaging appears well today.  A referral is placed for you to follow-up with PCP for reassessment.  Return the ED if there are worsening or concerning symptoms.

## 2020-05-07 NOTE — ED PROVIDER NOTES
History     Chief Complaint   Patient presents with     Abdominal Pain     HPI  Vikas Ji is a 80 year old male who presents to the ED today with chief complaint of abdominal pain.  He is somewhat of a poor historian but feels he has had this ongoing abdominal pain for approximately 1 month.  He feels like it is mostly located in his epigastric as well as his suprapubic region.  He does feel like there is a slight burning with urination.  He did feel little nauseous earlier today and has a decreased appetite today.  He denies any close sick contacts, diarrhea, testicle pain, fevers, chest pain or shortness of breath.    Allergies:  No Known Allergies    Problem List:    Patient Active Problem List    Diagnosis Date Noted     Change in bowel habits 03/11/2020     Priority: Medium     Other dysphagia 03/11/2020     Priority: Medium     Difficulty urinating 03/11/2020     Priority: Medium     Chewing tobacco use 12/18/2019     Priority: Medium     Vitamin B12 deficiency 09/17/2019     Priority: Medium     Memory problem 09/17/2019     Priority: Medium     Benign essential hypertension 11/28/2018     Priority: Medium     Mixed hyperlipidemia 11/28/2018     Priority: Medium     Balance problems 10/06/2015     Priority: Medium     Generalized weakness 10/06/2015     Priority: Medium     Controlled type 2 diabetes mellitus without complication, with long-term current use of insulin (H) 09/10/2015     Priority: Medium     History of tobacco use 09/10/2015     Priority: Medium     Low blood magnesium level 09/10/2015     Priority: Medium     Cough 11/28/2012     Priority: Medium     Inguinal hernia, right 04/19/2011     Priority: Medium     Diverticulosis of large intestine 10/18/2010     Priority: Medium     Personal history of other diseases of digestive system 10/18/2010     Priority: Medium        Past Medical History:    Past Medical History:   Diagnosis Date     Hypomagnesemia      Other abnormalities of gait  and mobility      Type 2 diabetes mellitus with hyperglycemia (H)        Past Surgical History:    Past Surgical History:   Procedure Laterality Date     COLONOSCOPY  2010,F/U   Ulcerative colitis       Family History:    Family History   Problem Relation Age of Onset     No Known Problems Mother      No Known Problems Father        Social History:  Marital Status:   [2]  Social History     Tobacco Use     Smoking status: Former Smoker     Last attempt to quit: 1987     Years since quittin.3     Smokeless tobacco: Current User     Types: Snuff   Substance Use Topics     Alcohol use: Yes     Alcohol/week: 6.0 standard drinks     Comment: 6 pack of beer per week     Drug use: No        Medications:    aspirin 81 MG chewable tablet  bisacodyl (DULCOLAX) 5 MG EC tablet  calcium & magnesium carbonates 311-232 MG TABS  cephALEXin (KEFLEX) 500 MG capsule  glipiZIDE (GLUCOTROL) 10 MG tablet  insulin glargine (LANTUS) 100 UNIT/ML injection  losartan (COZAAR) 100 MG tablet  metFORMIN (GLUCOPHAGE) 1000 MG tablet  Multiple Vitamin (MULTI-VITAMINS) TABS  omeprazole (PRILOSEC) 20 MG DR capsule  polyethylene glycol-electrolytes (NULYTELY WITH FLAVOR PACKS) 420 g solution  simvastatin (ZOCOR) 20 MG tablet  simvastatin (ZOCOR) 40 MG tablet          Review of Systems   Constitutional: Negative for chills and fever.   HENT: Negative for congestion.    Eyes: Negative for visual disturbance.   Respiratory: Negative for chest tightness and shortness of breath.    Cardiovascular: Negative for chest pain.   Gastrointestinal: Positive for abdominal pain and nausea. Negative for vomiting.   Genitourinary: Negative for hematuria.   Musculoskeletal: Negative for back pain.   Skin: Negative for rash and wound.   Neurological: Negative for syncope.   Hematological: Negative for adenopathy. Does not bruise/bleed easily.   Psychiatric/Behavioral: Negative for confusion.       Physical Exam   BP: (!) 162/87  Pulse:  "79  Heart Rate: 85  Temp: 98.2  F (36.8  C)  Resp: 9  Height: 165.1 cm (5' 5\")  Weight: 76.2 kg (168 lb)  SpO2: 97 %      Physical Exam  Constitutional:       General: He is not in acute distress.     Appearance: He is well-developed. He is not ill-appearing or diaphoretic.   HENT:      Head: Normocephalic and atraumatic.   Eyes:      General: No scleral icterus.     Conjunctiva/sclera: Conjunctivae normal.   Neck:      Musculoskeletal: Neck supple.   Cardiovascular:      Rate and Rhythm: Normal rate and regular rhythm.   Pulmonary:      Effort: Pulmonary effort is normal.      Breath sounds: Normal breath sounds.   Abdominal:      Palpations: Abdomen is soft.      Tenderness: There is generalized abdominal tenderness and tenderness in the epigastric area and suprapubic area.   Musculoskeletal:         General: No deformity.   Lymphadenopathy:      Cervical: No cervical adenopathy.   Skin:     General: Skin is warm and dry.      Findings: No rash.   Neurological:      Mental Status: He is alert and oriented to person, place, and time. Mental status is at baseline.   Psychiatric:         Mood and Affect: Mood normal.         Behavior: Behavior normal.         ED Course        Procedures               Critical Care time:  none               Results for orders placed or performed during the hospital encounter of 05/07/20 (from the past 24 hour(s))   CBC with platelets differential   Result Value Ref Range    WBC 6.6 4.0 - 11.0 10e9/L    RBC Count 4.70 4.4 - 5.9 10e12/L    Hemoglobin 14.4 13.3 - 17.7 g/dL    Hematocrit 43.2 40.0 - 53.0 %    MCV 92 78 - 100 fl    MCH 30.6 26.5 - 33.0 pg    MCHC 33.3 31.5 - 36.5 g/dL    RDW 12.6 10.0 - 15.0 %    Platelet Count 219 150 - 450 10e9/L    Diff Method Automated Method     % Neutrophils 73.9 %    % Lymphocytes 16.9 %    % Monocytes 6.2 %    % Eosinophils 1.8 %    % Basophils 0.9 %    % Immature Granulocytes 0.3 %    Absolute Neutrophil 4.9 1.6 - 8.3 10e9/L    Absolute " Lymphocytes 1.1 0.8 - 5.3 10e9/L    Absolute Monocytes 0.4 0.0 - 1.3 10e9/L    Absolute Eosinophils 0.1 0.0 - 0.7 10e9/L    Absolute Basophils 0.1 0.0 - 0.2 10e9/L    Abs Immature Granulocytes 0.0 0 - 0.4 10e9/L   Comprehensive metabolic panel   Result Value Ref Range    Sodium 139 134 - 144 mmol/L    Potassium 4.5 3.5 - 5.1 mmol/L    Chloride 103 98 - 107 mmol/L    Carbon Dioxide 28 21 - 31 mmol/L    Anion Gap 8 3 - 14 mmol/L    Glucose 114 (H) 70 - 105 mg/dL    Urea Nitrogen 16 7 - 25 mg/dL    Creatinine 0.86 0.70 - 1.30 mg/dL    GFR Estimate 85 >60 mL/min/[1.73_m2]    GFR Estimate If Black >90 >60 mL/min/[1.73_m2]    Calcium 9.9 8.6 - 10.3 mg/dL    Bilirubin Total 0.7 0.3 - 1.0 mg/dL    Albumin 4.5 3.5 - 5.7 g/dL    Protein Total 7.1 6.4 - 8.9 g/dL    Alkaline Phosphatase 42 34 - 104 U/L    ALT 13 7 - 52 U/L    AST 20 13 - 39 U/L   Lipase   Result Value Ref Range    Lipase 7 (L) 11 - 82 U/L   Lactic acid whole blood   Result Value Ref Range    Lactic Acid 1.0 0.7 - 2.0 mmol/L   UA reflex to Microscopic   Result Value Ref Range    Color Urine Yellow     Appearance Urine Slightly Cloudy     Glucose Urine Negative NEG^Negative mg/dL    Bilirubin Urine Negative NEG^Negative    Ketones Urine 10 (A) NEG^Negative mg/dL    Specific Gravity Urine 1.018 1.003 - 1.035    Blood Urine Small (A) NEG^Negative    pH Urine 7.0 5.0 - 7.0 pH    Protein Albumin Urine 30 (A) NEG^Negative mg/dL    Urobilinogen mg/dL Normal 0.0 - 2.0 mg/dL    Nitrite Urine Positive (A) NEG^Negative    Leukocyte Esterase Urine Large (A) NEG^Negative    Source Midstream Urine     RBC Urine 28 (H) 0 - 2 /HPF    WBC Urine >182 (H) 0 - 5 /HPF    WBC Clumps Present (A) NEG^Negative /HPF    Bacteria Urine Few (A) NEG^Negative /HPF    Mucous Urine Present (A) NEG^Negative /LPF   CT Abdomen Pelvis w Contrast    Narrative    CT ABDOMEN PELVIS W CONTRAST    CLINICAL HISTORY: Male, age 80 years,  generalized abdominal pain,  worsening today, most pain located  epigastric and suprapubic region;    Comparison:  None.    TECHNIQUE:  CT was performed of the abdomen and pelvis with IV  contrast. Sagittal, coronal and axial reconstructions were reviewed.     FINDINGS:    Abdomen/Pelvis CT:  Lung Bases:  Peripheral areas of atelectasis and early fibrotic  changes in the lung bases.    Esophagus/stomach: Moderate size hiatal hernia. No acute normality.    Liver:  Low dense lesions are too small to characterize.  Portal venous system: Patent.  Gallbladder: Gallbladder appears only partially distended. There is an  area of wall enhancement or perhaps a centrally lucent peripherally  dense stone along the lateral margin the gallbladder/gallbladder  fossa.    Spleen: Normal.    Pancreas: Atrophic changes of the pancreas.    Adrenal Glands: Normal.    Kidneys: Normal.  Ureters: Normal.  Urinary bladder: Normal.    Abdominal Aorta: Scattered atherosclerotic calcifications.  IVC: Normal.    Lymph Nodes: Normal.    Large and Small Bowel: Mild diverticulosis of the distal colon. No  diverticulitis or acute abnormality.  Appendix: Normal.    Pelvic Organs:  Prostate hypertrophy.  Peritoneum: Normal.  Bony structures: No acute normality. Degenerative changes are seen  within the lumbar spine, most evident at L4-5.    Other Findings:  Inguinal lymph nodes are normal.      Impression    IMPRESSION:   Abnormal appearance of the gallbladder. It is unclear if the findings  are related to a phrygian cap, incomplete distention or neoplasm.  Ultrasound evaluation may be helpful in assessing whether or not this  mixed density structure actually lies within the gallbladder lumen or  adjacent to the gallbladder.    Low dense lesions of the liver are too small to characterize however  suggest the presence of hepatic cysts.    Diverticulosis of the distal colon without evidence of diverticulitis.    Normal-appearing appendix.    JOSEPH BOSWELL MD   US Abdomen Limited    Narrative    PROCEDURES: US  ABDOMEN LIMITED    HISTORY:80 years Male . With generalized abdominal pain and abnormal  gallbladder as seen on CT from today.    TECHNIQUE:  Ultrasound of the right upper quadrant was performed.    COMPARISON: CT 5/7/2020     FINDINGS:    PANCREAS: The head and body are unremarkable.    ABDOMINAL AORTA AND IVC: There is atherosclerotic disease of the  abdominal aorta without evidence of aneurysm. The inferior vena cava  is patent.    LIVER: Echogenicity is uniform.    GALLBLADDER: Configuration of the gallbladder suggests presence of a  phrygian cap. Wall thickness of the apical component is 4 mm. There is  a small amount of internal sludge. Wall thickness of the lower  component is 2 to 3 mm. There is no pericholecystic fluid.    The common bile duct is 8 to 10 mm in diameter at the upper limits of  normal for patient's age.    Right Kidney: The right kidney is 11.8 cm in length without  hydronephrosis.        Impression    IMPRESSION: Morphology of the gallbladder suggestive of a phrygian  cap.    There is a small volume of sludge in the gallbladder lumen. There is  no significant gallbladder wall thickening or pericholecystic edema.  There is no evidence of biliary dilatation.    BJORN JENKINS MD       Medications   0.9% sodium chloride BOLUS (0 mLs Intravenous Stopped 5/7/20 1343)   ondansetron (ZOFRAN) injection 4 mg (4 mg Intravenous Given 5/7/20 1148)   cefTRIAXone in d5w (ROCEPHIN) intermittent infusion 1 g (0 g Intravenous Stopped 5/7/20 1623)   iohexol (OMNIPAQUE) 350 mg/mL solution 100 mL (100 mLs Intravenous Given 5/7/20 1401)       Assessments & Plan (with Medical Decision Making)   Patient is nontoxic in no acute distress.  Heart, lung, bowel sounds are normal.  Abdomen is soft but generalized tenderness throughout, mostly located in the epigastric and suprapubic region, but without guarding or rebound.  Vital signs are stable and he is afebrile.    His urinalysis is consistent with infection.  He  has no leukocytosis, lactic acid is 1.0.  CMP appears very well with normal LFTs and bilirubin..  Lipase is 7.    CT abdomen pelvis read as Abnormal appearance of the gallbladder. It is unclear if the findings  are related to a phrygian cap, incomplete distention or neoplasm.  Ultrasound evaluation may be helpful in assessing whether or not this  mixed density structure actually lies within the gallbladder lumen or  adjacent to the gallbladder.     Low dense lesions of the liver are too small to characterize however  suggest the presence of hepatic cysts.     Diverticulosis of the distal colon without evidence of diverticulitis.     Normal-appearing appendix.    Ultrasound read as Morphology of the gallbladder suggestive of a phrygian  cap.     There is a small volume of sludge in the gallbladder lumen. There is  no significant gallbladder wall thickening or pericholecystic edema.  There is no evidence of biliary dilatation.  Patient was given    A dose of Rocephin in the ED and will be sent home on Keflex.  Referrals placed for him to follow-up with PCP for reassessment and to ensure that he is improving.    Strict return precautions are given to the pt, they will return if symptoms are worsening or concerning. The pt understands and agrees with the plan and they are discharged.     Pancho Hopson PA-C    I have reviewed the nursing notes.    I have reviewed the findings, diagnosis, plan and need for follow up with the patient.       Discharge Medication List as of 5/7/2020  4:28 PM          Final diagnoses:   Urinary tract infection   Abdominal pain, generalized       5/7/2020   [facility]     Pancho Hopson PA  05/07/20 [id number]

## 2020-05-07 NOTE — ED NOTES
Daughter called request writer call her therefore I called daughter Nirmala [phone]. Answered Nirmala's questions and provided update of patient disposition (discharging) with RX and daughter will await for her father at ER doors and  RX at the [facility] retail pharmacy.

## 2020-05-07 NOTE — ED TRIAGE NOTES
Became slightly nauseated last night but went to bed and woke up this morning with severe nausea and abdominal pain across epigastric area. Rates pain a 10 out of 10. Has vomited in ER approx. 50cc.

## 2020-05-09 LAB
BACTERIA SPEC CULT: ABNORMAL
SPECIMEN SOURCE: ABNORMAL

## 2020-05-21 ENCOUNTER — DOCUMENTATION ONLY (OUTPATIENT)
Dept: OTHER | Facility: CLINIC | Age: 80
End: 2020-05-21

## 2020-06-04 ENCOUNTER — TELEPHONE (OUTPATIENT)
Dept: INTERNAL MEDICINE | Facility: OTHER | Age: 80
End: 2020-06-04

## 2020-06-04 DIAGNOSIS — Z79.4 CONTROLLED TYPE 2 DIABETES MELLITUS WITHOUT COMPLICATION, WITH LONG-TERM CURRENT USE OF INSULIN (H): Primary | ICD-10-CM

## 2020-06-04 DIAGNOSIS — E11.9 CONTROLLED TYPE 2 DIABETES MELLITUS WITHOUT COMPLICATION, WITH LONG-TERM CURRENT USE OF INSULIN (H): Primary | ICD-10-CM

## 2020-06-04 RX ORDER — INSULIN GLARGINE 100 [IU]/ML
45 INJECTION, SOLUTION SUBCUTANEOUS
Qty: 10 ML | Refills: 11 | Status: ON HOLD | OUTPATIENT
Start: 2020-06-04 | End: 2020-08-16

## 2020-06-04 NOTE — TELEPHONE ENCOUNTER
Reason for call: Medication or medication refill    Name of medication requested: Insulin    Are you out of the medication? ALMOST    What pharmacy do you use? [facility] to [facility] for refill    Preferred method for responding to this message: Telephone Call    Phone number patient can be reached at: Home number on file [phone] (home)    If we cannot reach you directly, may we leave a detailed response at the number you provided? Yes    Please contact patients wife, Nirmala regarding this refill.     Daniela Hall on 6/4/2020 at 4:12 PM

## 2020-06-04 NOTE — TELEPHONE ENCOUNTER
Routing refill request to provider for review/approval because:  Medication is reported/historical    LOV: 12/18/2019  Aide Sykes RN on 6/4/2020 at 4:15 PM

## 2020-06-05 RX ORDER — CEPHALEXIN 500 MG/1
500 CAPSULE ORAL 4 TIMES DAILY
Qty: 40 CAPSULE | Refills: 0 | OUTPATIENT
Start: 2020-06-05

## 2020-06-05 NOTE — TELEPHONE ENCOUNTER
Refill denied. Medication prescribed in ED. Patient needs to follow up. Michaela Muhammad RN on 6/5/2020 at 11:26 AM

## 2020-06-09 ENCOUNTER — TELEPHONE (OUTPATIENT)
Dept: UROLOGY | Facility: OTHER | Age: 80
End: 2020-06-09

## 2020-06-09 NOTE — TELEPHONE ENCOUNTER
I called patient to schedule for the urology referral we received. This had been postponed due to covid. Patient states at this time he is not having any issues and will wait to schedule. He will call us if he needs to be seen.    Aileen Norman on 6/9/2020 at 3:34 PM

## 2020-06-16 ENCOUNTER — HOSPITAL ENCOUNTER (EMERGENCY)
Facility: OTHER | Age: 80
Discharge: HOME | End: 2020-06-16
Attending: FAMILY MEDICINE | Admitting: FAMILY MEDICINE
Payer: COMMERCIAL

## 2020-06-16 VITALS
BODY MASS INDEX: 23.3 KG/M2 | RESPIRATION RATE: 16 BRPM | DIASTOLIC BLOOD PRESSURE: 71 MMHG | HEART RATE: 84 BPM | OXYGEN SATURATION: 98 % | TEMPERATURE: 98.2 F | SYSTOLIC BLOOD PRESSURE: 129 MMHG | WEIGHT: 140 LBS

## 2020-06-16 DIAGNOSIS — E16.2 HYPOGLYCEMIA: ICD-10-CM

## 2020-06-16 DIAGNOSIS — R32 URINARY INCONTINENCE, UNSPECIFIED TYPE: ICD-10-CM

## 2020-06-16 LAB
ALBUMIN SERPL-MCNC: 4.6 G/DL (ref 3.5–5.7)
ALBUMIN UR-MCNC: 10 MG/DL
ALP SERPL-CCNC: 45 U/L (ref 34–104)
ALT SERPL W P-5'-P-CCNC: 11 U/L (ref 7–52)
AMMONIA PLAS-SCNC: 37 UMOL/L (ref 16–53)
AMORPH CRY #/AREA URNS HPF: ABNORMAL /HPF
AMYLASE SERPL-CCNC: 22 U/L (ref 29–103)
ANION GAP SERPL CALCULATED.3IONS-SCNC: 10 MMOL/L (ref 3–14)
APPEARANCE UR: ABNORMAL
APTT PPP: 32 SEC (ref 22–37)
AST SERPL W P-5'-P-CCNC: 17 U/L (ref 13–39)
BACTERIA #/AREA URNS HPF: ABNORMAL /HPF
BASOPHILS # BLD AUTO: 0 10E9/L (ref 0–0.2)
BASOPHILS NFR BLD AUTO: 0.3 %
BILIRUB SERPL-MCNC: 1.1 MG/DL (ref 0.3–1)
BILIRUB UR QL STRIP: NEGATIVE
BUN SERPL-MCNC: 16 MG/DL (ref 7–25)
CALCIUM SERPL-MCNC: 10.1 MG/DL (ref 8.6–10.3)
CHLORIDE SERPL-SCNC: 104 MMOL/L (ref 98–107)
CO2 SERPL-SCNC: 26 MMOL/L (ref 21–31)
COLOR UR AUTO: YELLOW
CREAT SERPL-MCNC: 0.85 MG/DL (ref 0.7–1.3)
CRP SERPL-MCNC: 0.3 MG/L
DIFFERENTIAL METHOD BLD: ABNORMAL
EOSINOPHIL # BLD AUTO: 0.2 10E9/L (ref 0–0.7)
EOSINOPHIL NFR BLD AUTO: 1.4 %
ERYTHROCYTE [DISTWIDTH] IN BLOOD BY AUTOMATED COUNT: 12.5 % (ref 10–15)
ERYTHROCYTE [SEDIMENTATION RATE] IN BLOOD BY WESTERGREN METHOD: 6 MM/H (ref 1–10)
GFR SERPL CREATININE-BSD FRML MDRD: 87 ML/MIN/{1.73_M2}
GLUCOSE SERPL-MCNC: 91 MG/DL (ref 70–105)
GLUCOSE UR STRIP-MCNC: 300 MG/DL
HCT VFR BLD AUTO: 42.9 % (ref 40–53)
HGB BLD-MCNC: 14.6 G/DL (ref 13.3–17.7)
HGB UR QL STRIP: NEGATIVE
IMM GRANULOCYTES # BLD: 0.1 10E9/L (ref 0–0.4)
IMM GRANULOCYTES NFR BLD: 0.4 %
INR PPP: 1.05 (ref 0–1.3)
KETONES UR STRIP-MCNC: NEGATIVE MG/DL
LACTATE BLD-SCNC: 1.9 MMOL/L (ref 0.7–2)
LEUKOCYTE ESTERASE UR QL STRIP: NEGATIVE
LIPASE SERPL-CCNC: 10 U/L (ref 11–82)
LYMPHOCYTES # BLD AUTO: 0.9 10E9/L (ref 0.8–5.3)
LYMPHOCYTES NFR BLD AUTO: 7.4 %
MCH RBC QN AUTO: 31.4 PG (ref 26.5–33)
MCHC RBC AUTO-ENTMCNC: 34 G/DL (ref 31.5–36.5)
MCV RBC AUTO: 92 FL (ref 78–100)
MONOCYTES # BLD AUTO: 0.7 10E9/L (ref 0–1.3)
MONOCYTES NFR BLD AUTO: 5.5 %
MUCOUS THREADS #/AREA URNS LPF: PRESENT /LPF
NEUTROPHILS # BLD AUTO: 10.6 10E9/L (ref 1.6–8.3)
NEUTROPHILS NFR BLD AUTO: 85 %
NITRATE UR QL: NEGATIVE
PH UR STRIP: 8 PH (ref 5–7)
PLATELET # BLD AUTO: 196 10E9/L (ref 150–450)
POTASSIUM SERPL-SCNC: 3.9 MMOL/L (ref 3.5–5.1)
PROT SERPL-MCNC: 7.1 G/DL (ref 6.4–8.9)
RBC # BLD AUTO: 4.65 10E12/L (ref 4.4–5.9)
RBC #/AREA URNS AUTO: 5 /HPF (ref 0–2)
SODIUM SERPL-SCNC: 140 MMOL/L (ref 134–144)
SOURCE: ABNORMAL
SP GR UR STRIP: 1.02 (ref 1–1.03)
TROPONIN I SERPL-MCNC: 6.3 PG/ML
UROBILINOGEN UR STRIP-MCNC: NORMAL MG/DL (ref 0–2)
WBC # BLD AUTO: 12.5 10E9/L (ref 4–11)
WBC #/AREA URNS AUTO: 3 /HPF (ref 0–5)

## 2020-06-16 PROCEDURE — 81001 URINALYSIS AUTO W/SCOPE: CPT | Performed by: FAMILY MEDICINE

## 2020-06-16 PROCEDURE — 85025 COMPLETE CBC W/AUTO DIFF WBC: CPT | Performed by: FAMILY MEDICINE

## 2020-06-16 PROCEDURE — 36415 COLL VENOUS BLD VENIPUNCTURE: CPT | Performed by: FAMILY MEDICINE

## 2020-06-16 PROCEDURE — 85730 THROMBOPLASTIN TIME PARTIAL: CPT | Performed by: FAMILY MEDICINE

## 2020-06-16 PROCEDURE — 82140 ASSAY OF AMMONIA: CPT | Performed by: FAMILY MEDICINE

## 2020-06-16 PROCEDURE — 80053 COMPREHEN METABOLIC PANEL: CPT | Performed by: FAMILY MEDICINE

## 2020-06-16 PROCEDURE — 99284 EMERGENCY DEPT VISIT MOD MDM: CPT | Performed by: FAMILY MEDICINE

## 2020-06-16 PROCEDURE — 84484 ASSAY OF TROPONIN QUANT: CPT | Performed by: FAMILY MEDICINE

## 2020-06-16 PROCEDURE — 86140 C-REACTIVE PROTEIN: CPT | Performed by: FAMILY MEDICINE

## 2020-06-16 PROCEDURE — 25800030 ZZH RX IP 258 OP 636: Performed by: FAMILY MEDICINE

## 2020-06-16 PROCEDURE — 93010 ELECTROCARDIOGRAM REPORT: CPT | Performed by: INTERNAL MEDICINE

## 2020-06-16 PROCEDURE — 85652 RBC SED RATE AUTOMATED: CPT | Performed by: FAMILY MEDICINE

## 2020-06-16 PROCEDURE — 85610 PROTHROMBIN TIME: CPT | Performed by: FAMILY MEDICINE

## 2020-06-16 PROCEDURE — 99284 EMERGENCY DEPT VISIT MOD MDM: CPT | Mod: 25 | Performed by: FAMILY MEDICINE

## 2020-06-16 PROCEDURE — 96361 HYDRATE IV INFUSION ADD-ON: CPT | Performed by: FAMILY MEDICINE

## 2020-06-16 PROCEDURE — 99284 EMERGENCY DEPT VISIT MOD MDM: CPT | Mod: Z6 | Performed by: FAMILY MEDICINE

## 2020-06-16 PROCEDURE — 82150 ASSAY OF AMYLASE: CPT | Performed by: FAMILY MEDICINE

## 2020-06-16 PROCEDURE — 83605 ASSAY OF LACTIC ACID: CPT | Performed by: FAMILY MEDICINE

## 2020-06-16 PROCEDURE — 93005 ELECTROCARDIOGRAM TRACING: CPT | Performed by: FAMILY MEDICINE

## 2020-06-16 PROCEDURE — 25800025 ZZH RX 258: Performed by: FAMILY MEDICINE

## 2020-06-16 PROCEDURE — 96360 HYDRATION IV INFUSION INIT: CPT | Performed by: FAMILY MEDICINE

## 2020-06-16 PROCEDURE — 83690 ASSAY OF LIPASE: CPT | Performed by: FAMILY MEDICINE

## 2020-06-16 PROCEDURE — 87040 BLOOD CULTURE FOR BACTERIA: CPT | Mod: 91 | Performed by: FAMILY MEDICINE

## 2020-06-16 RX ORDER — DEXTROSE MONOHYDRATE 25 G/50ML
50 INJECTION, SOLUTION INTRAVENOUS ONCE
Status: COMPLETED | OUTPATIENT
Start: 2020-06-16 | End: 2020-06-16

## 2020-06-16 RX ORDER — SODIUM CHLORIDE 9 MG/ML
1000 INJECTION, SOLUTION INTRAVENOUS CONTINUOUS
Status: DISCONTINUED | OUTPATIENT
Start: 2020-06-16 | End: 2020-06-16 | Stop reason: HOSPADM

## 2020-06-16 RX ADMIN — SODIUM CHLORIDE 1000 ML: 9 INJECTION, SOLUTION INTRAVENOUS at 10:54

## 2020-06-16 RX ADMIN — DEXTROSE MONOHYDRATE 50 ML: 500 INJECTION PARENTERAL at 09:57

## 2020-06-16 ASSESSMENT — ENCOUNTER SYMPTOMS: ABDOMINAL PAIN: 1

## 2020-06-16 NOTE — ED AVS SNAPSHOT
[facility]  [address]  [place] [address]  Phone:  [phone]  Fax:  [phone]                                    Vikas Ji   MRN: [id number]    Department:  [facility]   Date of Visit:  6/16/2020           After Visit Summary Signature Page    I have received my discharge instructions, and my questions have been answered. I have discussed any challenges I see with this plan with the nurse or doctor.    ..........................................................................................................................................  Patient/Patient Representative Signature      ..........................................................................................................................................  Patient Representative Print Name and Relationship to Patient    ..................................................               ................................................  Date                                   Time    ..........................................................................................................................................  Reviewed by Signature/Title    ...................................................              ..............................................  Date                                               Time          22EPIC Rev 08/18

## 2020-06-16 NOTE — ED TRIAGE NOTES
For about 1 month has been experiencing upper abdominal discomfort with nausea and some vomiting which comes and goes but has been getting worse. Eating makes it worse.

## 2020-06-16 NOTE — DISCHARGE INSTRUCTIONS
Schedule follow up appointment with DR Salter . Return to ER for worsening or concerning symptoms

## 2020-06-16 NOTE — ED PROVIDER NOTES
History     Chief Complaint   Patient presents with     Abdominal Pain       Abdominal Pain   Associated symptoms: nausea    Associated symptoms: no constipation, no diarrhea and no vomiting      Vikas Ji is a 80 year old male who presents with issues with urination over the last 1 month . States hes had issues with urination and incontinence over the last month . Also states hes had some nausea and vomitting over the last month  Also has had some urgency . He was recently seen in the ER on 5/7 for UTI and abdominal pain . He also was supposted to have ans outpatient EGD for evaluate his abdominal pain but he has now cancelled this twice . He denies chest pain . NO cough . NO shortness of breath . Denies recent issues with constipation . While in triage patients blood sugar was checked and found to be quite low at 55, Patient states that his insulin dose has not been recently changed and that he did remember to eat breakfast this am prior to returning to ER   Allergies:  No Known Allergies    Problem List:    Patient Active Problem List    Diagnosis Date Noted     Change in bowel habits 03/11/2020     Priority: Medium     Other dysphagia 03/11/2020     Priority: Medium     Difficulty urinating 03/11/2020     Priority: Medium     Chewing tobacco use 12/18/2019     Priority: Medium     Vitamin B12 deficiency 09/17/2019     Priority: Medium     Memory problem 09/17/2019     Priority: Medium     Benign essential hypertension 11/28/2018     Priority: Medium     Mixed hyperlipidemia 11/28/2018     Priority: Medium     Balance problems 10/06/2015     Priority: Medium     Generalized weakness 10/06/2015     Priority: Medium     Controlled type 2 diabetes mellitus without complication, with long-term current use of insulin (H) 09/10/2015     Priority: Medium     History of tobacco use 09/10/2015     Priority: Medium     Low blood magnesium level 09/10/2015     Priority: Medium     Cough 11/28/2012     Priority:  Medium     Inguinal hernia, right 2011     Priority: Medium     Diverticulosis of large intestine 10/18/2010     Priority: Medium     Personal history of other diseases of digestive system 10/18/2010     Priority: Medium        Past Medical History:    Past Medical History:   Diagnosis Date     Hypomagnesemia      Other abnormalities of gait and mobility      Type 2 diabetes mellitus with hyperglycemia (H)        Past Surgical History:    Past Surgical History:   Procedure Laterality Date     COLONOSCOPY  2010,F/U   Ulcerative colitis       Family History:    Family History   Problem Relation Age of Onset     No Known Problems Mother      No Known Problems Father        Social History:  Marital Status:   [2]  Social History     Tobacco Use     Smoking status: Former Smoker     Last attempt to quit: 1987     Years since quittin.4     Smokeless tobacco: Current User     Types: Snuff   Substance Use Topics     Alcohol use: Yes     Alcohol/week: 6.0 standard drinks     Comment: 6 pack of beer per week     Drug use: No        Medications:    aspirin 81 MG chewable tablet  bisacodyl (DULCOLAX) 5 MG EC tablet  calcium & magnesium carbonates 311-232 MG TABS  cephALEXin (KEFLEX) 500 MG capsule  glipiZIDE (GLUCOTROL) 10 MG tablet  insulin glargine (LANTUS VIAL) 100 UNIT/ML vial  losartan (COZAAR) 100 MG tablet  metFORMIN (GLUCOPHAGE) 1000 MG tablet  Multiple Vitamin (MULTI-VITAMINS) TABS  omeprazole (PRILOSEC) 20 MG DR capsule  polyethylene glycol-electrolytes (NULYTELY WITH FLAVOR PACKS) 420 g solution  simvastatin (ZOCOR) 20 MG tablet  simvastatin (ZOCOR) 40 MG tablet          Review of Systems   Constitutional: Negative.    HENT: Negative.    Respiratory: Negative.    Gastrointestinal: Positive for abdominal pain and nausea. Negative for anal bleeding, blood in stool, constipation, diarrhea and vomiting.   Genitourinary: Positive for enuresis, frequency and urgency.    Musculoskeletal: Negative.    Skin: Negative.    Neurological: Negative for dizziness and seizures.       Physical Exam   BP: (!) 153/81  Pulse: 93  Temp: 98.2  F (36.8  C)  Resp: 18  Weight: 63.5 kg (140 lb)  SpO2: 96 %      Physical Exam  Vitals signs and nursing note reviewed.   Constitutional:       General: He is not in acute distress.  HENT:      Head: Normocephalic and atraumatic.   Cardiovascular:      Rate and Rhythm: Normal rate and regular rhythm.   Pulmonary:      Effort: Pulmonary effort is normal.      Breath sounds: Normal breath sounds.   Abdominal:      General: Abdomen is flat. Bowel sounds are normal. There is no distension.      Palpations: Abdomen is soft.      Tenderness: There is no abdominal tenderness.   Skin:     General: Skin is warm and dry.   Neurological:      Mental Status: He is alert.         ED Course        Procedures          Patient presents to ER for evaluation of nausea , vomitting as well as urinary urgency and incontinence, ON initial triage patient found to have a blood sugar of 55 . Patient brought immediately back to exam room . Patient given orange juice while peripheral IV inserted D50 given following IV insertion . Blood sugar improved to 87 after orange juice. Vital signs reviewed. Patient without fever . Stable vital signs. Medical records reviewed. Patient has had consultation for EGD which he has now cancelled times 2 . Patient also appears to have consult with DR Caryt Hourly glucose checks scheduled. Labs and diagnostics ordered. Patients white count elevated but UA not diagnostic for UTI. Normal CRP, ESR , lactic acid which is reassuring l Amylase and lipase not elevated. Metabolic panel significant only for miminally elevated bilirubin of 1.1 . While I was in a code and prior to informing patient of Urinalysis results and other lab results patient ripped IV out of his own hand got dressed and left ER AMA  Hopefully he will follow up with his primary care to  discuss his insulin as I recommended after he first arrived and reschedule the EGD study which he has cancelled twice         Results for orders placed or performed during the hospital encounter of 06/16/20   CBC with platelets differential     Status: Abnormal   Result Value Ref Range    WBC 12.5 (H) 4.0 - 11.0 10e9/L    RBC Count 4.65 4.4 - 5.9 10e12/L    Hemoglobin 14.6 13.3 - 17.7 g/dL    Hematocrit 42.9 40.0 - 53.0 %    MCV 92 78 - 100 fl    MCH 31.4 26.5 - 33.0 pg    MCHC 34.0 31.5 - 36.5 g/dL    RDW 12.5 10.0 - 15.0 %    Platelet Count 196 150 - 450 10e9/L    Diff Method Automated Method     % Neutrophils 85.0 %    % Lymphocytes 7.4 %    % Monocytes 5.5 %    % Eosinophils 1.4 %    % Basophils 0.3 %    % Immature Granulocytes 0.4 %    Absolute Neutrophil 10.6 (H) 1.6 - 8.3 10e9/L    Absolute Lymphocytes 0.9 0.8 - 5.3 10e9/L    Absolute Monocytes 0.7 0.0 - 1.3 10e9/L    Absolute Eosinophils 0.2 0.0 - 0.7 10e9/L    Absolute Basophils 0.0 0.0 - 0.2 10e9/L    Abs Immature Granulocytes 0.1 0 - 0.4 10e9/L   INR     Status: None   Result Value Ref Range    INR 1.05 0 - 1.3   Partial thromboplastin time     Status: None   Result Value Ref Range    PTT 32 22 - 37 sec   Comprehensive metabolic panel     Status: Abnormal   Result Value Ref Range    Sodium 140 134 - 144 mmol/L    Potassium 3.9 3.5 - 5.1 mmol/L    Chloride 104 98 - 107 mmol/L    Carbon Dioxide 26 21 - 31 mmol/L    Anion Gap 10 3 - 14 mmol/L    Glucose 91 70 - 105 mg/dL    Urea Nitrogen 16 7 - 25 mg/dL    Creatinine 0.85 0.70 - 1.30 mg/dL    GFR Estimate 87 >60 mL/min/[1.73_m2]    GFR Estimate If Black >90 >60 mL/min/[1.73_m2]    Calcium 10.1 8.6 - 10.3 mg/dL    Bilirubin Total 1.1 (H) 0.3 - 1.0 mg/dL    Albumin 4.6 3.5 - 5.7 g/dL    Protein Total 7.1 6.4 - 8.9 g/dL    Alkaline Phosphatase 45 34 - 104 U/L    ALT 11 7 - 52 U/L    AST 17 13 - 39 U/L   Ammonia     Status: None   Result Value Ref Range    Ammonia 37 16 - 53 umol/L   Lactic acid whole blood      Status: None   Result Value Ref Range    Lactic Acid 1.9 0.7 - 2.0 mmol/L   Lipase     Status: Abnormal   Result Value Ref Range    Lipase 10 (L) 11 - 82 U/L   Amylase     Status: Abnormal   Result Value Ref Range    Amylase 22 (L) 29 - 103 U/L   Troponin GH     Status: None   Result Value Ref Range    Troponin 6.3 <34.0 pg/mL   Erythrocyte sedimentation rate auto     Status: None   Result Value Ref Range    Sed Rate 6 1 - 10 mm/h   CRP inflammation     Status: None   Result Value Ref Range    CRP Inflammation 0.3 <0.5 mg/L   UA with Microscopic reflex to Culture     Status: Abnormal    Specimen: Urine clean catch; Midstream Urine   Result Value Ref Range    Color Urine Yellow     Appearance Urine Slightly Cloudy     Glucose Urine 300 (A) NEG^Negative mg/dL    Bilirubin Urine Negative NEG^Negative    Ketones Urine Negative NEG^Negative mg/dL    Specific Gravity Urine 1.020 1.003 - 1.035    Blood Urine Negative NEG^Negative    pH Urine 8.0 (H) 5.0 - 7.0 pH    Protein Albumin Urine 10 (A) NEG^Negative mg/dL    Urobilinogen mg/dL Normal 0.0 - 2.0 mg/dL    Nitrite Urine Negative NEG^Negative    Leukocyte Esterase Urine Negative NEG^Negative    Source Midstream Urine     WBC Urine 3 0 - 5 /HPF    RBC Urine 5 (H) 0 - 2 /HPF    Bacteria Urine Few (A) NEG^Negative /HPF    Mucous Urine Present (A) NEG^Negative /LPF    Amorphous Crystals Few (A) NEG^Negative /HPF   EKG 12-lead, tracing only     Status: None   Result Value Ref Range    Interpretation ECG Click View Image link to view waveform and result    Blood culture     Status: None (Preliminary result)    Specimen: Blood   Result Value Ref Range    Specimen Description Blood     Culture Micro No growth after 22 hours    Blood culture     Status: None (Preliminary result)    Specimen: Blood   Result Value Ref Range    Specimen Description Blood     Culture Micro No growth after 22 hours         No results found for this or any previous visit (from the past 24  hour(s)).    Medications - No data to display    Assessments & Plan (with Medical Decision Making)     I have reviewed the nursing notes.    I have reviewed the findings, diagnosis, plan and need for follow up with the patient.      New Prescriptions    No medications on file       Final diagnoses:   Urinary incontinence, unspecified type   Hypoglycemia       6/16/2020   [facility]     [facility] Ena Wilson MD  06/17/20 [id number]

## 2020-06-17 LAB — INTERPRETATION ECG - MUSE: NORMAL

## 2020-06-17 ASSESSMENT — ENCOUNTER SYMPTOMS
DIARRHEA: 0
FREQUENCY: 1
CONSTITUTIONAL NEGATIVE: 1
CONSTIPATION: 0
ANAL BLEEDING: 0
NAUSEA: 1
DIZZINESS: 0
BLOOD IN STOOL: 0
VOMITING: 0
RESPIRATORY NEGATIVE: 1
SEIZURES: 0
MUSCULOSKELETAL NEGATIVE: 1

## 2020-06-18 ENCOUNTER — TELEPHONE (OUTPATIENT)
Dept: INTERNAL MEDICINE | Facility: OTHER | Age: 80
End: 2020-06-18

## 2020-06-18 NOTE — TELEPHONE ENCOUNTER
He needs to get his upper endoscopy completed.     If there are other swallowing issues as well.... consider appointment in surgery clinic 1st...   -- may need to consider esophageal xray vs other testing.       Routed to surgery clinic to call patient re: above.     Ace Salter MD

## 2020-06-18 NOTE — TELEPHONE ENCOUNTER
Patient was recently in the er with esophagus issues. Patient is having trouble and will not see anyone but DJS. Is a work in possible?    Betty Crockett on 6/18/2020 at 12:16 PM

## 2020-06-19 NOTE — TELEPHONE ENCOUNTER
Spoke with patient's wife.  She is mainly concerned about the problem swallowing.  I will have Endoscopy   call patient to re-schedule EGD in [facility] to swallowing issues.    Christine Lew LPN........................6/19/2020  8:19 AM

## 2020-06-22 LAB
BACTERIA SPEC CULT: NORMAL
BACTERIA SPEC CULT: NORMAL
SPECIMEN SOURCE: NORMAL
SPECIMEN SOURCE: NORMAL

## 2020-06-26 ENCOUNTER — TELEPHONE (OUTPATIENT)
Dept: INTERNAL MEDICINE | Facility: OTHER | Age: 80
End: 2020-06-26

## 2020-06-26 DIAGNOSIS — Z01.818 PRE-OP EXAM: Primary | ICD-10-CM

## 2020-06-26 NOTE — TELEPHONE ENCOUNTER
Please matthew up order and return to me. I am in [facility] tomorrow so he could stop in for that.    Thanks.

## 2020-06-26 NOTE — TELEPHONE ENCOUNTER
Dr. Salter patient, but Dr. Salter is out of office.  Patient has a colonoscope Tuesday and needs orders for a Covid test.  Cristina Jovel LPN on 6/26/2020 at 1:11 PM

## 2020-06-27 ENCOUNTER — ALLIED HEALTH/NURSE VISIT (OUTPATIENT)
Dept: FAMILY MEDICINE | Facility: OTHER | Age: 80
End: 2020-06-27
Attending: SURGERY
Payer: COMMERCIAL

## 2020-06-27 DIAGNOSIS — Z01.818 PRE-OP EXAM: ICD-10-CM

## 2020-06-27 PROCEDURE — C9803 HOPD COVID-19 SPEC COLLECT: HCPCS

## 2020-06-27 PROCEDURE — U0003 INFECTIOUS AGENT DETECTION BY NUCLEIC ACID (DNA OR RNA); SEVERE ACUTE RESPIRATORY SYNDROME CORONAVIRUS 2 (SARS-COV-2) (CORONAVIRUS DISEASE [COVID-19]), AMPLIFIED PROBE TECHNIQUE, MAKING USE OF HIGH THROUGHPUT TECHNOLOGIES AS DESCRIBED BY CMS-2020-01-R: HCPCS | Mod: ZL | Performed by: NURSE PRACTITIONER

## 2020-06-29 LAB
SARS-COV-2 RNA SPEC QL NAA+PROBE: NOT DETECTED
SPECIMEN SOURCE: NORMAL

## 2020-06-30 ENCOUNTER — ANESTHESIA EVENT (OUTPATIENT)
Dept: SURGERY | Facility: OTHER | Age: 80
End: 2020-06-30
Payer: COMMERCIAL

## 2020-06-30 ENCOUNTER — HOSPITAL ENCOUNTER (OUTPATIENT)
Facility: OTHER | Age: 80
Discharge: HOME | End: 2020-06-30
Attending: SURGERY | Admitting: SURGERY
Payer: COMMERCIAL

## 2020-06-30 ENCOUNTER — ANESTHESIA (OUTPATIENT)
Dept: SURGERY | Facility: OTHER | Age: 80
End: 2020-06-30
Payer: COMMERCIAL

## 2020-06-30 VITALS
SYSTOLIC BLOOD PRESSURE: 141 MMHG | DIASTOLIC BLOOD PRESSURE: 79 MMHG | RESPIRATION RATE: 12 BRPM | WEIGHT: 140 LBS | HEIGHT: 65 IN | HEART RATE: 74 BPM | BODY MASS INDEX: 23.32 KG/M2 | TEMPERATURE: 96.8 F | OXYGEN SATURATION: 99 %

## 2020-06-30 DIAGNOSIS — K44.9 HIATAL HERNIA: ICD-10-CM

## 2020-06-30 DIAGNOSIS — K22.89 ESOPHAGEAL MASS: Primary | ICD-10-CM

## 2020-06-30 DIAGNOSIS — R19.4 CHANGE IN BOWEL HABITS: ICD-10-CM

## 2020-06-30 PROCEDURE — 43239 EGD BIOPSY SINGLE/MULTIPLE: CPT | Mod: 51 | Performed by: SURGERY

## 2020-06-30 PROCEDURE — 25800030 ZZH RX IP 258 OP 636: Performed by: SURGERY

## 2020-06-30 PROCEDURE — 25000132 ZZH RX MED GY IP 250 OP 250 PS 637: Performed by: SURGERY

## 2020-06-30 PROCEDURE — 45380 COLONOSCOPY AND BIOPSY: CPT | Mod: XU | Performed by: SURGERY

## 2020-06-30 PROCEDURE — 43239 EGD BIOPSY SINGLE/MULTIPLE: CPT | Performed by: SURGERY

## 2020-06-30 PROCEDURE — 88360 TUMOR IMMUNOHISTOCHEM/MANUAL: CPT

## 2020-06-30 PROCEDURE — 25000125 ZZHC RX 250: Performed by: SURGERY

## 2020-06-30 PROCEDURE — 99100 ANES PT EXTEME AGE<1 YR&>70: CPT | Performed by: NURSE ANESTHETIST, CERTIFIED REGISTERED

## 2020-06-30 PROCEDURE — 43239 EGD BIOPSY SINGLE/MULTIPLE: CPT | Performed by: NURSE ANESTHETIST, CERTIFIED REGISTERED

## 2020-06-30 PROCEDURE — 25000128 H RX IP 250 OP 636: Performed by: NURSE ANESTHETIST, CERTIFIED REGISTERED

## 2020-06-30 PROCEDURE — 45385 COLONOSCOPY W/LESION REMOVAL: CPT

## 2020-06-30 PROCEDURE — 45385 COLONOSCOPY W/LESION REMOVAL: CPT | Performed by: SURGERY

## 2020-06-30 PROCEDURE — 88305 TISSUE EXAM BY PATHOLOGIST: CPT

## 2020-06-30 PROCEDURE — 25000125 ZZHC RX 250: Performed by: NURSE ANESTHETIST, CERTIFIED REGISTERED

## 2020-06-30 RX ORDER — LIDOCAINE HYDROCHLORIDE 20 MG/ML
INJECTION, SOLUTION INFILTRATION; PERINEURAL PRN
Status: DISCONTINUED | OUTPATIENT
Start: 2020-06-30 | End: 2020-06-30

## 2020-06-30 RX ORDER — LIDOCAINE 40 MG/G
CREAM TOPICAL
Status: DISCONTINUED | OUTPATIENT
Start: 2020-06-30 | End: 2020-06-30 | Stop reason: HOSPADM

## 2020-06-30 RX ORDER — FLUMAZENIL 0.1 MG/ML
0.2 INJECTION, SOLUTION INTRAVENOUS
Status: CANCELLED | OUTPATIENT
Start: 2020-06-30 | End: 2020-07-01

## 2020-06-30 RX ORDER — SODIUM CHLORIDE, SODIUM LACTATE, POTASSIUM CHLORIDE, CALCIUM CHLORIDE 600; 310; 30; 20 MG/100ML; MG/100ML; MG/100ML; MG/100ML
INJECTION, SOLUTION INTRAVENOUS CONTINUOUS
Status: DISCONTINUED | OUTPATIENT
Start: 2020-06-30 | End: 2020-06-30 | Stop reason: HOSPADM

## 2020-06-30 RX ORDER — SIMETHICONE
LIQUID (ML) MISCELLANEOUS PRN
Status: DISCONTINUED | OUTPATIENT
Start: 2020-06-30 | End: 2020-06-30 | Stop reason: HOSPADM

## 2020-06-30 RX ORDER — PROPOFOL 10 MG/ML
INJECTION, EMULSION INTRAVENOUS CONTINUOUS PRN
Status: DISCONTINUED | OUTPATIENT
Start: 2020-06-30 | End: 2020-06-30

## 2020-06-30 RX ORDER — PROPOFOL 10 MG/ML
INJECTION, EMULSION INTRAVENOUS PRN
Status: DISCONTINUED | OUTPATIENT
Start: 2020-06-30 | End: 2020-06-30

## 2020-06-30 RX ORDER — NALOXONE HYDROCHLORIDE 0.4 MG/ML
.1-.4 INJECTION, SOLUTION INTRAMUSCULAR; INTRAVENOUS; SUBCUTANEOUS
Status: CANCELLED | OUTPATIENT
Start: 2020-06-30 | End: 2020-07-01

## 2020-06-30 RX ADMIN — PROPOFOL 120 MCG/KG/MIN: 10 INJECTION, EMULSION INTRAVENOUS at 11:44

## 2020-06-30 RX ADMIN — LIDOCAINE HYDROCHLORIDE 40 MG: 20 INJECTION, SOLUTION INFILTRATION; PERINEURAL at 11:44

## 2020-06-30 RX ADMIN — PROPOFOL 40 MG: 10 INJECTION, EMULSION INTRAVENOUS at 11:44

## 2020-06-30 RX ADMIN — SODIUM CHLORIDE, POTASSIUM CHLORIDE, SODIUM LACTATE AND CALCIUM CHLORIDE: 600; 310; 30; 20 INJECTION, SOLUTION INTRAVENOUS at 11:23

## 2020-06-30 ASSESSMENT — LIFESTYLE VARIABLES: TOBACCO_USE: 1

## 2020-06-30 ASSESSMENT — MIFFLIN-ST. JEOR: SCORE: 1266.92

## 2020-06-30 NOTE — ANESTHESIA CARE TRANSFER NOTE
Patient: Vikas Ji    Procedure(s):  COLONOSCOPY, WITH POLYPECTOMY AND BIOPSY  ESOPHAGOGASTRODUODENOSCOPY, WITH BIOPSY    Diagnosis: Dysphagia [R13.10]  Change in bowel habits [R19.4]  Diagnosis Additional Information: No value filed.    Anesthesia Type:   MAC     Note:  Airway :Room Air  Patient transferred to:Phase II  Handoff Report: Identifed the Patient, Identified the Reponsible Provider, Reviewed the pertinent medical history, Discussed the surgical course, Reviewed Intra-OP anesthesia mangement and issues during anesthesia, Set expectations for post-procedure period and Allowed opportunity for questions and acknowledgement of understanding      Vitals: (Last set prior to Anesthesia Care Transfer)    CRNA VITALS  6/30/2020 1215 - 6/30/2020 1246      6/30/2020             Pulse:  75    Ht Rate:  75    SpO2:  100 %    Resp Rate (set):  10                Electronically Signed By: LUANN WELLER CRNA  June 30, 2020  12:46 PM

## 2020-06-30 NOTE — H&P
GENERAL SURGERY CONSULTATION NOTE    Vikas Ji   [address] [address] [id number]  80 year old  male    Primary Care Provider:  Ace Salter      HPI: Vikas Ji presents to day surgery in need of EGD and colonoscopy for dysphagia and change in bowel habits.   Vikas Ji denies family history of colon cancer. Patient denies blood in stools. Previous colonoscopy was 2010, ulcerative colitis.      REVIEW OF SYSTEMS:    GENERAL: No fevers or chills. Denies fatigue, recent weight loss.  HEENT: No sinus drainage. No changes with vision or hearing. No difficulty swallowing.   LYMPHATICS:  Noswollen nodes in axilla, neck or groin.  CARDIOVASCULAR: Denies chest pain, palpitations and dyspnea on exertion.  PULMONARY: No shortness of breath or cough. No increase in sputum production.  GI: Denies melena,bright red blood in stools. No hematemesis. No constipation or diarrhea.  : No dysuria or hematuria.  SKIN: No recent rashes or ulcers.   HEMATOLOGY:  No history of easy bruising or bleeding.  ENDOCRINE:  No history of diabetes or thyroid problems.  NEUROLOGY:  No history of seizures or headaches. No motor or sensory changes.        Patient Active Problem List   Diagnosis     Balance problems     Cough     Controlled type 2 diabetes mellitus without complication, with long-term current use of insulin (H)     Diverticulosis of large intestine     Generalized weakness     History of tobacco use     Inguinal hernia, right     Low blood magnesium level     Personal history of other diseases of digestive system     Benign essential hypertension     Mixed hyperlipidemia     Vitamin B12 deficiency     Memory problem     Chewing tobacco use     Change in bowel habits     Other dysphagia     Difficulty urinating       Past Medical History:   Diagnosis Date     Hypomagnesemia     9/10/2015     Other abnormalities of gait and mobility     10/6/2015     Type 2 diabetes mellitus with hyperglycemia (H)      9/10/2015       Past Surgical History:   Procedure Laterality Date     COLONOSCOPY  2010,F/U   Ulcerative colitis       Family History   Problem Relation Age of Onset     No Known Problems Mother      No Known Problems Father        Social History     Social History Narrative    , lives with his wife, remains active       Social History     Socioeconomic History     Marital status:      Spouse name: Not on file     Number of children: Not on file     Years of education: Not on file     Highest education level: Not on file   Occupational History     Not on file   Social Needs     Financial resource strain: Not on file     Food insecurity     Worry: Not on file     Inability: Not on file     Transportation needs     Medical: Not on file     Non-medical: Not on file   Tobacco Use     Smoking status: Former Smoker     Last attempt to quit: 1987     Years since quittin.5     Smokeless tobacco: Current User     Types: Snuff   Substance and Sexual Activity     Alcohol use: Yes     Alcohol/week: 6.0 standard drinks     Comment: 6 pack of beer per week     Drug use: No     Sexual activity: Not Currently   Lifestyle     Physical activity     Days per week: Not on file     Minutes per session: Not on file     Stress: Not on file   Relationships     Social connections     Talks on phone: Not on file     Gets together: Not on file     Attends Anabaptism service: Not on file     Active member of club or organization: Not on file     Attends meetings of clubs or organizations: Not on file     Relationship status: Not on file     Intimate partner violence     Fear of current or ex partner: Not on file     Emotionally abused: Not on file     Physically abused: Not on file     Forced sexual activity: Not on file   Other Topics Concern     Parent/sibling w/ CABG, MI or angioplasty before 65F 55M? Not Asked   Social History Narrative    , lives with his wife, remains active       No  "current facility-administered medications on file prior to encounter.   aspirin 81 MG chewable tablet, Take 81 mg by mouth once  bisacodyl (DULCOLAX) 5 MG EC tablet, Take two tablets at noon day prior to colonoscopy  calcium & magnesium carbonates 311-232 MG TABS, Take 3 tablets by mouth daily  glipiZIDE (GLUCOTROL) 10 MG tablet, Take 1 tablet (10 mg) by mouth 2 times daily (before meals) In AM and PM -- and add 1/2 tablet daily with Lunch.  insulin glargine (LANTUS VIAL) 100 UNIT/ML vial, Inject 45 Units Subcutaneous every morning (before breakfast) (Patient taking differently: Inject 35 Units Subcutaneous every morning (before breakfast) )  losartan (COZAAR) 100 MG tablet, Take 1 tablet (100 mg) by mouth daily  metFORMIN (GLUCOPHAGE) 1000 MG tablet, Take 1 tablet by mouth 2 times daily (with meals)  Multiple Vitamin (MULTI-VITAMINS) TABS, Take 2 tablets by mouth daily  omeprazole (PRILOSEC) 20 MG DR capsule, Take 1 capsule (20 mg) by mouth daily  polyethylene glycol-electrolytes (NULYTELY WITH FLAVOR PACKS) 420 g solution, Take 250 mL every 10 minutes the day prior to colonoscopy  simvastatin (ZOCOR) 20 MG tablet, Take 1 tablet by mouth daily (with dinner) Patient started taking 20 mg 11/25/15.  simvastatin (ZOCOR) 40 MG tablet, Take 1 tablet (40 mg) by mouth At Bedtime          ALLERGIES/SENSITIVITIES: No Known Allergies    PHYSICAL EXAM:     BP (!) 175/90   Pulse 83   Temp 98  F (36.7  C) (Temporal)   Resp 18   Ht 1.651 m (5' 5\")   Wt 63.5 kg (140 lb)   SpO2 99%   BMI 23.30 kg/m      General Appearance:   Sitting up in bed, no apparent distress  HEENT: Pupils are equal and reactive, no scleral icterus   Heart & CV:  RRR, no murmur.  LUNGS: No increased work of breathing. Lungs are CTA B/L, no wheezing or crackles.  Abd:  soft, non-tender, no masses   Ext: no lower extremity edema   Neuro: alert and oriented, normal speech and mentation         CONSULTATION ASSESSMENT AND PLAN:    80 year old male with " dysphagia and change in bowel habits in need of diagnostic EGD and colonoscopy.      The technical details of EGD and colonoscopy were discussed with the patient along with the risks and benefits to include bleeding, perforation, aspiration and incomplete study. Vikas Ji demonstrated understanding and is willing to proceed.       Eliseo Arshad MD on 6/30/2020 at 11:34 AM

## 2020-06-30 NOTE — ANESTHESIA POSTPROCEDURE EVALUATION
Patient: Vikas Ji    Procedure(s):  COLONOSCOPY, WITH POLYPECTOMY AND BIOPSY  ESOPHAGOGASTRODUODENOSCOPY, WITH BIOPSY    Diagnosis:Dysphagia [R13.10]  Change in bowel habits [R19.4]  Diagnosis Additional Information: No value filed.    Anesthesia Type:  MAC    Note:  Anesthesia Post Evaluation    Patient location during evaluation: Phase 2 and Endoscopy Recovery  Patient participation: Able to fully participate in evaluation  Level of consciousness: awake and alert  Pain management: adequate  Airway patency: patent  Cardiovascular status: acceptable  Respiratory status: acceptable  Hydration status: acceptable  PONV: none     Anesthetic complications: None          Last vitals:  Vitals:    06/30/20 1302 06/30/20 1315 06/30/20 1330   BP: (!) 85/37 111/65 (!) 151/77   Pulse: 74 72 71   Resp: 12 12    Temp:   96.8  F (36  C)   SpO2: 91% 97% 97%         Electronically Signed By: LUANN Dewey CRNA  June 30, 2020  1:57 PM

## 2020-06-30 NOTE — ANESTHESIA PREPROCEDURE EVALUATION
Anesthesia Pre-Procedure Evaluation    Patient: Vikas Ji   MRN: [id number] : 1940          Preoperative Diagnosis: Dysphagia [R13.10]  Change in bowel habits [R19.4]    Procedure(s):  COLONOSCOPY  ESOPHAGOGASTRODUODENOSCOPY (EGD)    Past Medical History:   Diagnosis Date     Hypomagnesemia     9/10/2015     Other abnormalities of gait and mobility     10/6/2015     Type 2 diabetes mellitus with hyperglycemia (H)     9/10/2015     Past Surgical History:   Procedure Laterality Date     COLONOSCOPY  2010,F/U   Ulcerative colitis       Anesthesia Evaluation     . Pt has had prior anesthetic.     No history of anesthetic complications          ROS/MED HX    ENT/Pulmonary:     (+)tobacco use, Past use , . .    Neurologic: Comment: Generalized weakness, unstable gait    (+)other neuro dysphagia, memory loss    Cardiovascular:     (+) Dyslipidemia, hypertension----. : . . . :. .       METS/Exercise Tolerance:  4 - Raking leaves, gardening   Hematologic:  - neg hematologic  ROS       Musculoskeletal:  - neg musculoskeletal ROS       GI/Hepatic:     (+) GERD bowel prep,       Renal/Genitourinary:  - ROS Renal section negative       Endo:     (+) type II DM .      Psychiatric:         Infectious Disease:  - neg infectious disease ROS       Malignancy:      - no malignancy   Other:    - neg other ROS                      Physical Exam  Normal systems: cardiovascular and pulmonary    Airway   Mallampati: II  TM distance: >3 FB  Neck ROM: full    Dental   (+) upper dentures and lower dentures  Comment: Upper and lower dentures out    Cardiovascular   Rhythm and rate: regular and normal      Pulmonary    breath sounds clear to auscultation            Lab Results   Component Value Date    WBC 12.5 (H) 2020    HGB 14.6 2020    HCT 42.9 2020     2020    CRP 0.3 2020    SED 6 2020     2020    POTASSIUM 3.9 2020    CHLORIDE 104 2020     "CO2 26 06/16/2020    BUN 16 06/16/2020    CR 0.85 06/16/2020    GLC 91 06/16/2020    JOSELITO 10.1 06/16/2020    MAG 1.9 08/19/2018    ALBUMIN 4.6 06/16/2020    PROTTOTAL 7.1 06/16/2020    ALT 11 06/16/2020    AST 17 06/16/2020    ALKPHOS 45 06/16/2020    BILITOTAL 1.1 (H) 06/16/2020    LIPASE 10 (L) 06/16/2020    AMYLASE 22 (L) 06/16/2020    JIM 37 06/16/2020    PTT 32 06/16/2020    INR 1.05 06/16/2020       Preop Vitals  BP Readings from Last 3 Encounters:   06/30/20 (!) 175/90   06/16/20 129/71   05/07/20 (!) 161/78    Pulse Readings from Last 3 Encounters:   06/30/20 83   06/16/20 84   05/07/20 72      Resp Readings from Last 3 Encounters:   06/30/20 18   06/16/20 16   05/07/20 18    SpO2 Readings from Last 3 Encounters:   06/30/20 99%   06/16/20 98%   05/07/20 96%      Temp Readings from Last 1 Encounters:   06/30/20 98  F (36.7  C) (Temporal)    Ht Readings from Last 1 Encounters:   06/30/20 1.651 m (5' 5\")      Wt Readings from Last 1 Encounters:   06/30/20 63.5 kg (140 lb)    Estimated body mass index is 23.3 kg/m  as calculated from the following:    Height as of this encounter: 1.651 m (5' 5\").    Weight as of this encounter: 63.5 kg (140 lb).       Anesthesia Plan      History & Physical Review      ASA Status:  3 .    NPO Status:  > 8 hours    Plan for MAC            Postoperative Care      Consents  Anesthetic plan, risks, benefits and alternatives discussed with:  Patient.  Use of blood products discussed: Yes.   Use of blood products discussed with Patient.  Consented to blood products.  .                 LUANN Dewey CRNA  "

## 2020-06-30 NOTE — OR NURSING
Discharged via W/C after eating and passing urine.  Instructions reviewed with dtr Nirmala and pt.

## 2020-06-30 NOTE — OP NOTE
PROCEDURE NOTE    DATE OF SERVICE: 6/30/2020    SURGEON: BECKY Arshad MD     PRE-OP DIAGNOSIS:  dysphagia and change in bowel habits     POST-OP DIAGNOSIS:  Esophogeal mass, colon polyps, diverticulosis    PROCEDURE:   EGD with biopsy  Colonoscopy with biopsy and snare polypectomy     ASSISTANT:  Circulator: Vandana Mac RN  Relief Circulator: Marleny Amezcua RN  Scrub Person: Michaela Harper  Pre-Op Nurse: Kate Porras RN  Post-Op Nurse: Inge Drake RN    ANESTHESIA:  MAC                            Monitor Anesthesia CareCRNA Independent: Jordyn Rose APRN CRNA    INDICATION FOR THE PROCEDURE: Vikas Ji is a 80 year old male. The patient presents with dysphagia and change in bowel habits. I explained to the patient the risks, benefits and alternatives to diagnostic EGD and colonoscopy. We specifically discussed the risks of bleeding, infection,perforation and the risks of sedation. The patient's questions were answered and the patient wished to proceed. Informed consent paperwork was completed.    PROCEDURE:The patient was taken to the endoscopy suite. Appropriate monitors were attached. The patient was placed in the left lateral decubitus position. Bite block was positioned.Timeout was performed confirming the patient's identity and procedure to be performed. After appropriate sedation was confirmed, the flexible endoscope was advanced into the oropharynx. The posterior oropharynx appeared grossly normal. The scope was advanced into the proximal esophagus. The esophagus was insufflated with air. The scope was advanced under direct visualization. Large, ulcerated mass of the distal esophagus is seen. The mass is ~4cm in length and circumferential. There is a small opening in to the stomach. The scope was advanced into the stomach. The scope was advanced through the pylorus into the duodenal bulb. The bulb and distal duodenum appeared grossly normal.  The scope was withdrawn back  into the stomach. Antral biopsy was obtained and sent to pathology. The scope was retroflexed and the GE junction inspected. Small hiatal hernia noted. The scope was returned to a neutral position and the stomach was decompressed. The scope was withdrawn to the GE junction and the mass is extensively biopsied. The mucosa of the esophagus was inspected while withdrawing the scope. No other abnormalities were noted. The scope was withdrawn from the patient. The bite block was removed. The patient tolerated the procedure with no immediately apparent complication.     Rectal exam was performed.  There was normal tone and no palpable masses.  The colonoscope was introduced into the rectum and advanced to the cecum with Moderate difficulty.  The patient's prep was good.  The terminal cecum was reached.  The cecum, ascending, transverse, descending and sigmoid colon were significant for numerous small 1-2mm pedunculated polyps throughout the colon that were removed with combination cold forceps and hot snare. Polyps are removed from transverse colon, sigmoid colon and rectum. Random biopsies were taken from throughout the colon with cold forceps. The scope was retroflexed in the rectum.  The rectum was unremarkable.  The scope was straightened and removed.  The patient tolerated the procedure well.     ESTIMATED BLOOD LOSS: none    COMPLICATIONS:  None    TISSUE REMOVED:  Yes    RECOMMEND:    Follow up biopsies  Referral to medical oncology for esophogeal mass   Chew food thoroughly and take with lots of water     BECKY Arshad MD

## 2020-06-30 NOTE — DISCHARGE INSTRUCTIONS
[facility]  Adult Discharge Orders & Instructions      For 24 hours after surgery:  1. Get plenty of rest.  A responsible adult must stay with you for at least 24 hours after you leave the hospital.   2. You may feel lightheaded.  IF so, sit for a few minutes before standing.  Have someone help you get up.   3. You may have a slight fever. Call the doctor if your fever is over 101 F (38.3 C) (taken under the tongue) or lasts longer than 24 hours.  4. You may have a dry mouth, a sore throat, muscle aches or trouble sleeping.  These should go away after 24 hours.  5. Do not make important or legal decisions.  6.   Do not drive or use heavy equipment.  If you have weakness or tingling, don't drive or use heavy equipment until this feeling goes away.                                                                                                                                                                         To contact a doctor, call    [phone]______________

## 2020-07-03 ENCOUNTER — TELEPHONE (OUTPATIENT)
Dept: SURGERY | Facility: OTHER | Age: 80
End: 2020-07-03

## 2020-07-03 NOTE — TELEPHONE ENCOUNTER
Darcy Griffin MD on 7/3/2020 at 11:45 AM  Discussed adenocarcinoma finding on EGD pathology with the patient's daughter.

## 2020-07-03 NOTE — TELEPHONE ENCOUNTER
Patient's daughter is requesting to be notified of his pathology results,  when  report is available.

## 2020-07-06 DIAGNOSIS — C15.9 ADENOCARCINOMA OF ESOPHAGUS (H): Primary | ICD-10-CM

## 2020-07-06 NOTE — PROGRESS NOTES
Spoke with daughter Nirmala and they are in agreement for PET scan,MR brain,and labs. Will consult with Christine Rodriguez MD after scans and labs.  Tracie Borrego RN...........7/6/2020 10:47 AM

## 2020-07-17 ENCOUNTER — HOSPITAL ENCOUNTER (OUTPATIENT)
Dept: MRI IMAGING | Facility: OTHER | Age: 80
End: 2020-07-17
Attending: INTERNAL MEDICINE
Payer: COMMERCIAL

## 2020-07-17 DIAGNOSIS — C15.9 ADENOCARCINOMA OF ESOPHAGUS (H): ICD-10-CM

## 2020-07-17 LAB
ALBUMIN SERPL-MCNC: 4.4 G/DL (ref 3.5–5.7)
ALP SERPL-CCNC: 48 U/L (ref 34–104)
ALT SERPL W P-5'-P-CCNC: 11 U/L (ref 7–52)
ANION GAP SERPL CALCULATED.3IONS-SCNC: 9 MMOL/L (ref 3–14)
AST SERPL W P-5'-P-CCNC: 15 U/L (ref 13–39)
BASOPHILS # BLD AUTO: 0.1 10E9/L (ref 0–0.2)
BASOPHILS NFR BLD AUTO: 0.9 %
BILIRUB SERPL-MCNC: 0.7 MG/DL (ref 0.3–1)
BUN SERPL-MCNC: 14 MG/DL (ref 7–25)
CALCIUM SERPL-MCNC: 9.8 MG/DL (ref 8.6–10.3)
CEA SERPL-MCNC: <3 NG/ML
CHLORIDE SERPL-SCNC: 100 MMOL/L (ref 98–107)
CO2 SERPL-SCNC: 27 MMOL/L (ref 21–31)
CREAT SERPL-MCNC: 0.94 MG/DL (ref 0.7–1.3)
DIFFERENTIAL METHOD BLD: NORMAL
EOSINOPHIL # BLD AUTO: 0.4 10E9/L (ref 0–0.7)
EOSINOPHIL NFR BLD AUTO: 5.5 %
ERYTHROCYTE [DISTWIDTH] IN BLOOD BY AUTOMATED COUNT: 12.1 % (ref 10–15)
GFR SERPL CREATININE-BSD FRML MDRD: 77 ML/MIN/{1.73_M2}
GLUCOSE SERPL-MCNC: 148 MG/DL (ref 70–105)
HCT VFR BLD AUTO: 41.2 % (ref 40–53)
HGB BLD-MCNC: 14 G/DL (ref 13.3–17.7)
IMM GRANULOCYTES # BLD: 0 10E9/L (ref 0–0.4)
IMM GRANULOCYTES NFR BLD: 0.6 %
LDH SERPL L TO P-CCNC: 106 U/L (ref 140–271)
LYMPHOCYTES # BLD AUTO: 1.5 10E9/L (ref 0.8–5.3)
LYMPHOCYTES NFR BLD AUTO: 21 %
MCH RBC QN AUTO: 31.4 PG (ref 26.5–33)
MCHC RBC AUTO-ENTMCNC: 34 G/DL (ref 31.5–36.5)
MCV RBC AUTO: 92 FL (ref 78–100)
MONOCYTES # BLD AUTO: 0.5 10E9/L (ref 0–1.3)
MONOCYTES NFR BLD AUTO: 7.1 %
NEUTROPHILS # BLD AUTO: 4.5 10E9/L (ref 1.6–8.3)
NEUTROPHILS NFR BLD AUTO: 64.9 %
PLATELET # BLD AUTO: 205 10E9/L (ref 150–450)
POTASSIUM SERPL-SCNC: 4.9 MMOL/L (ref 3.5–5.1)
PROT SERPL-MCNC: 6.9 G/DL (ref 6.4–8.9)
RBC # BLD AUTO: 4.46 10E12/L (ref 4.4–5.9)
SODIUM SERPL-SCNC: 136 MMOL/L (ref 134–144)
WBC # BLD AUTO: 6.9 10E9/L (ref 4–11)

## 2020-07-17 PROCEDURE — 83615 LACTATE (LD) (LDH) ENZYME: CPT | Mod: ZL | Performed by: INTERNAL MEDICINE

## 2020-07-17 PROCEDURE — 70553 MRI BRAIN STEM W/O & W/DYE: CPT

## 2020-07-17 PROCEDURE — 36415 COLL VENOUS BLD VENIPUNCTURE: CPT | Mod: ZL | Performed by: INTERNAL MEDICINE

## 2020-07-17 PROCEDURE — A9575 INJ GADOTERATE MEGLUMI 0.1ML: HCPCS | Performed by: INTERNAL MEDICINE

## 2020-07-17 PROCEDURE — 80053 COMPREHEN METABOLIC PANEL: CPT | Mod: ZL | Performed by: INTERNAL MEDICINE

## 2020-07-17 PROCEDURE — 25500064 ZZH RX 255 OP 636: Performed by: INTERNAL MEDICINE

## 2020-07-17 PROCEDURE — 82378 CARCINOEMBRYONIC ANTIGEN: CPT | Mod: ZL | Performed by: INTERNAL MEDICINE

## 2020-07-17 PROCEDURE — 85025 COMPLETE CBC W/AUTO DIFF WBC: CPT | Mod: ZL | Performed by: INTERNAL MEDICINE

## 2020-07-17 RX ADMIN — GADOTERATE MEGLUMINE 12 ML: 376.9 INJECTION INTRAVENOUS at 09:42

## 2020-07-22 ENCOUNTER — HOSPITAL ENCOUNTER (OUTPATIENT)
Dept: PET IMAGING | Facility: OTHER | Age: 80
Discharge: HOME | End: 2020-07-22
Attending: INTERNAL MEDICINE | Admitting: INTERNAL MEDICINE
Payer: COMMERCIAL

## 2020-07-22 DIAGNOSIS — C15.9 ADENOCARCINOMA OF ESOPHAGUS (H): ICD-10-CM

## 2020-07-22 PROCEDURE — 78815 PET IMAGE W/CT SKULL-THIGH: CPT | Mod: PS

## 2020-07-22 PROCEDURE — A9552 F18 FDG: HCPCS | Performed by: INTERNAL MEDICINE

## 2020-07-22 PROCEDURE — 34300033 ZZH RX 343: Performed by: INTERNAL MEDICINE

## 2020-07-22 RX ADMIN — FLUDEOXYGLUCOSE F-18 12.57 MCI.: 500 INJECTION, SOLUTION INTRAVENOUS at 18:18

## 2020-07-30 ENCOUNTER — ONCOLOGY VISIT (OUTPATIENT)
Dept: ONCOLOGY | Facility: OTHER | Age: 80
End: 2020-07-30
Attending: SURGERY
Payer: COMMERCIAL

## 2020-07-30 VITALS
TEMPERATURE: 98.5 F | OXYGEN SATURATION: 95 % | DIASTOLIC BLOOD PRESSURE: 88 MMHG | SYSTOLIC BLOOD PRESSURE: 154 MMHG | WEIGHT: 167 LBS | HEIGHT: 65 IN | BODY MASS INDEX: 27.82 KG/M2 | HEART RATE: 90 BPM | RESPIRATION RATE: 16 BRPM

## 2020-07-30 DIAGNOSIS — K22.89 ESOPHAGEAL MASS: ICD-10-CM

## 2020-07-30 DIAGNOSIS — C15.9 ADENOCARCINOMA OF ESOPHAGUS (H): Primary | ICD-10-CM

## 2020-07-30 PROCEDURE — G0463 HOSPITAL OUTPT CLINIC VISIT: HCPCS | Performed by: INTERNAL MEDICINE

## 2020-07-30 PROCEDURE — 99215 OFFICE O/P EST HI 40 MIN: CPT | Performed by: INTERNAL MEDICINE

## 2020-07-30 ASSESSMENT — PAIN SCALES - GENERAL: PAINLEVEL: NO PAIN (0)

## 2020-07-30 ASSESSMENT — MIFFLIN-ST. JEOR: SCORE: 1389.39

## 2020-07-30 NOTE — PROGRESS NOTES
Visit Date:   07/30/2020      REASON FOR CONSULTATION:  Adenocarcinoma of the distal esophagus.      REQUESTING PHYSICIAN:  Dr. Arshad as well as Dr. Salter.      HISTORY OF PRESENT ILLNESS:  Mr. Ji is an 80-year-old white male with previous history of dementia, type 2 diabetes mellitus, we were asked to evaluate concerning diagnosis of adenocarcinoma of distal esophagus.  Apparently, he had presented in June of this year with dysphagia to solids, nausea, abdominal pain as well as constipation.  He was seen by Dr. Arshad, who performed EGD which revealed that there was a mass in the distal esophagus.  This was performed on 06/30/2020.  Pathology revealed that there was mildly differentiated adenocarcinoma, HER-2/edd by IHC was negative.  The patient also had a PET scan and MRI of the brain.  PET scan was done on 07/22 and the findings were that there was focal hypermetabolism within the distal esophagus compatible with esophageal carcinoma.  No evidence of hypermetabolic metastatic disease.  There was no lymphadenopathy noted.  MRI of the brain revealed no evidence of metastases.  There was mild small vessel ischemic change or generalized atrophy.  The patient suffers from mild dementia.  Also, type 2 diabetes mellitus.  He says currently his major complaint is that he spits up food at times.  He still has dysphagia to solids but can tolerate liquids and soft foods.  He denies any weight loss.  He does have mild dementia.  He is oriented to place.  He denies any fevers, night sweats.  He still has ongoing constipation, no bright red blood per rectum, hematemesis.  No shortness of breath, cough, headaches.        PAST MEDICAL HISTORY:  Significant for type 2 diabetes mellitus, dementia, hyperlipidemia, hypertension, vitamin B12 deficiency, chewing tobacco use, dysphagia, right inguinal hernia, diverticulosis.      ALLERGIES:  HE HAS NO KNOWN DRUG ALLERGIES.      CURRENT MEDICATIONS:  Include:   1.   Aspirin 81 mg daily.   2.  Lantus insulin 45 units subcutaneously q.a.m.   3.  Glucotrol 10 mg b.i.d.   4.  Cozaar 100 mg daily.   5.  Prilosec 20 mg daily.   6.  Zocor 40 mg at bedtime.   7.  Glucophage 1000 mg b.i.d.   8.  Multivitamins 2 tablets daily.      SOCIAL HISTORY:  He is a former smoker.  He currently chews tobacco.  Alcohol is negative.  He is a retired farmer.      FAMILY HISTORY:  Significant for uncle with esophageal cancer.      REVIEW OF SYSTEMS:   CENTRAL NERVOUS SYSTEM:  The patient has memory loss, mild dementia.  No headaches.   ENT:  Negative for hearing loss.   RESPIRATORY:  Negative for cough, shortness of breath, hemoptysis.   CARDIAC:  Negative chest pain, palpitations, orthopnea, PND.   GASTROINTESTINAL:  Significant for dysphagia, reflux symptoms as well as constipation.  No bright red blood per rectum or hematemesis.   MUSCULOSKELETAL:  Unremarkable.   GENITOURINARY:  Negative for dysuria, urgency, frequency.   CONSTITUTIONAL:  Negative for fevers, night sweats, weight loss.   HEMATOLOGIC:  Negative for easy bruisability, gingival bleeding, epistaxis.      PHYSICAL EXAMINATION:   GENERAL:  He is a demented elderly white male in no acute distress.   VITAL SIGNS:  ECOG performance status of 1.  Blood pressure 154/88, pulse 90, respirations 16, temperature 98.5.   HEENT:  Atraumatic, normocephalic.  Oropharynx nonerythematous.   NECK:  Supple.   LUNGS:  Clear to auscultation and percussion.   HEART:  Regular rhythm, S1, S2 normal.   ABDOMEN:  Soft, normoactive bowel sounds.  No mass, nontender.   LYMPHATICS:  No cervical, supraclavicular or axillary nodes.   EXTREMITIES:  No edema.   NEUROLOGIC:  Nonfocal.      LABORATORY DATA:  Laboratories reveal CEA is less than 3.  CBC is within normal limits.  BUN 14, creatinine 0.94.  LFTs are normal.  LDH is normal.      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  The patient is  potentially surgically resectable candidate, but given his underlying dementia, age and comorbidities would likely not be a surgical candidate.  The patient does have underlying neuropathy as well due to diabetes and CarboTaxol also might be contraindicated given his multiple comorbidities, age, dementia.  Would therefore recommend Radiation Oncology consult for palliative radiation therapy.  There is data suggesting that his disease free survival benefit of 56% at 3 years with radiation therapy alone.  The daughter is an oncology nurse and is not interested in chemotherapy at this time as well, which is probably reasonable.  We will cosult  Radiation Oncology and see the patient after she completes radiation therapy.      Seventy minutes was spent with patient, greater than half the time was spent in counseling and coordinating care.         JULY BEGUM MD             D: 2020   T: 2020   MT: RONNI      Name:     DYLON DESOUZA   MRN:      [id number]        Account:      [id number]   :      1940           Visit Date:   2020      Document: [id number]       cc: Ace Arshad MD

## 2020-07-30 NOTE — NURSING NOTE
"Chief Complaint   Patient presents with     Consult     Esophageal mass   Complains of vomiting more mucous, especially in the mornings and having some trouble swallowing solid foods.    Initial BP (!) 154/88   Pulse 90   Temp 98.5  F (36.9  C) (Oral)   Resp 16   Ht 1.651 m (5' 5\")   Wt 75.8 kg (167 lb)   SpO2 95%   BMI 27.79 kg/m   Estimated body mass index is 27.79 kg/m  as calculated from the following:    Height as of this encounter: 1.651 m (5' 5\").    Weight as of this encounter: 75.8 kg (167 lb).  Medication Reconciliation: complete    Malena Mendoza CMA (AAMA)  "

## 2020-08-07 ENCOUNTER — TRANSFERRED RECORDS (OUTPATIENT)
Dept: HEALTH INFORMATION MANAGEMENT | Facility: OTHER | Age: 80
End: 2020-08-07

## 2020-08-10 ENCOUNTER — ONCOLOGY VISIT (OUTPATIENT)
Dept: RADIATION ONCOLOGY | Facility: HOSPITAL | Age: 80
End: 2020-08-10
Attending: RADIOLOGY
Payer: COMMERCIAL

## 2020-08-10 VITALS
BODY MASS INDEX: 27.61 KG/M2 | SYSTOLIC BLOOD PRESSURE: 152 MMHG | DIASTOLIC BLOOD PRESSURE: 74 MMHG | HEIGHT: 65 IN | HEART RATE: 84 BPM | TEMPERATURE: 97.4 F | WEIGHT: 165.7 LBS | RESPIRATION RATE: 16 BRPM

## 2020-08-10 DIAGNOSIS — C15.5 PRIMARY ADENOCARCINOMA OF DISTAL THIRD OF ESOPHAGUS (H): Primary | ICD-10-CM

## 2020-08-10 PROCEDURE — 99205 OFFICE O/P NEW HI 60 MIN: CPT | Performed by: RADIOLOGY

## 2020-08-10 PROCEDURE — G0463 HOSPITAL OUTPT CLINIC VISIT: HCPCS | Performed by: RADIOLOGY

## 2020-08-10 SDOH — HEALTH STABILITY: MENTAL HEALTH: HOW OFTEN DO YOU HAVE A DRINK CONTAINING ALCOHOL?: 4 OR MORE TIMES A WEEK

## 2020-08-10 SDOH — HEALTH STABILITY: MENTAL HEALTH: HOW MANY STANDARD DRINKS CONTAINING ALCOHOL DO YOU HAVE ON A TYPICAL DAY?: 1 OR 2

## 2020-08-10 ASSESSMENT — MIFFLIN-ST. JEOR: SCORE: 1383.49

## 2020-08-10 ASSESSMENT — PAIN SCALES - GENERAL: PAINLEVEL: NO PAIN (0)

## 2020-08-10 NOTE — PROGRESS NOTES
"INITIAL PATIENT ASSESSMENT    Chief Complaint   Patient presents with     Radiation Therapy       Initial BP (!) 152/74 (BP Location: Left arm, Patient Position: Chair, Cuff Size: Adult Regular)   Pulse 84   Temp 97.4  F (36.3  C) (Tympanic)   Resp 16   Ht 1.651 m (5' 5\")   Wt 75.2 kg (165 lb 11.2 oz)   BMI 27.57 kg/m   Estimated body mass index is 27.57 kg/m  as calculated from the following:    Height as of this encounter: 1.651 m (5' 5\").    Weight as of this encounter: 75.2 kg (165 lb 11.2 oz).  Medication Reconciliation: complete      Referring Physician: Jennifer  Other Physicians: Emery Salter    Diagnosis: Cancer of Distal Esophagus    Prior radiation therapy: None    Prior chemotherapy: None    Prior hormonal therapy:N/A    Pain Eval:  Denies    Psychosocial  Marital Status:    Spouse/Significant other: Gail   Children: 7  Occupation: farmer    Retired: Yes  Living arrangements: Home with wife  Do you feel safe at home? Yes  Activity status: independent (cane occasionally)   referral needs: Not needed    Advanced Directive: Yes - Location: On file    Patient was assessed for the influenza, pneumo-poly, prevnar 13, Tdap, and shingles immunizations. \"Vaccinations and Cancer Treatment\" flyer given.  Instructed patient to discuss vaccination status with his/her PCM.     Patient was assessed using the NCCN psychosocial distress thermometer. Patient rated the score as a 5/10. Patient rated current stressors as \"when you get older like this, you get depression fairly easy, but my wife and I get over things fairly quickly\". Stressors will be brought to the attention of provider or Oncology RN Care Coordinator for a score of 6 or greater or per nurses discretion.     Pt is here today for a consult for radiation therapy for esophageal cancer.  Educated patient on the mapping process and the possible side effects of XRT to the esophagus, to include: fatigue, skin reaction, and " esophagitis.  Pt and daughter verbalize an understanding and has no questions at this time. Pt is accompanied by his daughter, Nirmala, today.    Fouzia Jones RN

## 2020-08-10 NOTE — PROGRESS NOTES
"  [facility]  DEPARTMENT OF RADIATION ONCOLOGY  CONSULTATION NOTE    Name: Vikas Ji MRN: [id number]   : 1940 (80 year old)  Date of Service: 08/10/2020 Referring: Christine Rodriguez       Diagnosis and Cancer Staging  Adenocarcinoma of esophagus (H)  Staging form: Esophagus - Adenocarcinoma, AJCC 8th Edition  - Clinical stage from 8/10/2020: Stage IIB (cT2, cN0, cM0, G2) - Signed by Hero Ramirez MD on 8/10/2020       History of Present Illness   The patient is a very pleasant 80-year-old gentleman who medical oncology referred for consultation about the role of radiation therapy for palliation of symptomatic (odynophagia, dysphagia, weight loss, fatigue) of 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 The patient is felt to be medically inoperable and medically not a candidate for chemotherapy. Among his pertinent comorbidities and circumstances are \"mild dementia and forgetfulness\" (children drive him), chronic mild hearing loss (hearing aids pending), \"ulcerative colitis\" (carries the diagnosis but not noted on recent colonoscopy), diverticular disease, type 2 diabetes mellitus that requires metformin as well as insulin, and significant diabetic peripheral neuropathy. His daughter is an oncology nurse, and his maternal uncle  of esophageal cancer in the  (difficult demise). We counseled the patient and his family about COVID-19 risks, precautions, and potential impact on his radiation therapy. He denied known exposure to COVID-19, risky behaviors, or symptoms including febrile, chest, gustatory, and systemic complaints.    The patient's oncologic history is summarized briefly below (based one EMR and today's visit):    [id number] Surgery consultation ([facility]): Presented with 6-12 months of mid-chest odynophagia with dysphagia to solids (required water; slow passage), 10-lb weight loss over 5-months, and " increasing fatigue above his baseline.    5/7/2020 CT abdomen/pelvis (contrast) and ultrasound: Gallbladder abnormality on CT noted as a phrygian cap on ultrasound.    6/30/2020 EGD and colonoscopy without ultrasound ([facility]): Biopsy of an 4x4-cm ulcerated mass mass in distal esophagus yielded mildly differentiated adenocarcinoma (HER2 negative). Felt to be a poor surgical candidate.    7/17/2020 Brain MRI ([facility]): No gross metastases.    7/22/2020 PET-CT and brain MRI: Distal esophageal hypermetabolic mass. No gross regional leann or distant metastatic disease.    7/30/2020 Medical oncology consultation ([facility]): Designated as [id number] esophageal cancer. Recommended palliative radiation therapy. Poor candidate for chemotherapy because of co-morbidities. Daughter wishes that her father avoid chemotherapy (she is an oncology nurse).     The patient feels in relatively good health. He finds his principal complaints above to be tolerable but increasingly annoying and bothersome. He reports minimal reflux, taste changes, and anorexia. He denies pain when not eating (swallowing liquids does not cause pain).He is chronically constipated and has a preferred bowel regimen that effectively promotes evacuation (milk of magnesia as needed). He denies nausea, vomiting, or abdominal pain. He denies aspiration or reflux symptoms. He denies bowel habit changes, diarrhea, or blood per rectum. Except as noted above, the patient has no known relative or absolute contraindications to radiation therapy such as a history of Paget's disease of the bone, fibrous dysplasia, Crohn's disease, and other chronic gastrointestinal complaints.He has no known history of radiation exposure, chemotherapy, or collagen vascular diseases. 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 No history of Camargo's disease of the esophagus or a prior cancer of the upper aerodigestive tract. No known family history of cancer syndromes or polyposis syndromes.        Past Medical History: Adenocarcinoma of the distal esophagus, \"mild dementia and forgetfulness\", slightly hard of hearing (hearing aids pending), type 2 diabetes mellitus requiring metformin and insulin, hyperlipidemia, hypertension, hypomagnesia, mild gait instability from balance issue,   Past Surgical History: None.   Chemotherapy History: No.  Radiation History: No.  Implanted Cardiac Devices: No.  Implanted Chest Wall Infusion Port: No.    Medications:  Current Outpatient Medications   Medication Sig Dispense Refill     aspirin 81 MG chewable tablet Take 81 mg by mouth once       calcium & magnesium carbonates 311-232 MG TABS Take 3 tablets by mouth daily       glipiZIDE (GLUCOTROL) 10 MG tablet Take 1 tablet (10 mg) by mouth 2 times daily (before meals) In AM and PM -- and add 1/2 tablet daily with Lunch. 180 tablet 3     insulin glargine (LANTUS VIAL) 100 UNIT/ML vial Inject 45 Units Subcutaneous every morning (before breakfast) (Patient taking differently: Inject 35 Units Subcutaneous every morning (before breakfast) ) 10 mL 11     losartan (COZAAR) 100 MG tablet Take 1 tablet (100 mg) by mouth daily 90 tablet 3     metFORMIN (GLUCOPHAGE) 1000 MG tablet Take 1 tablet by mouth 2 times daily (with meals)       Multiple Vitamin (MULTI-VITAMINS) TABS Take 2 tablets by mouth daily       omeprazole (PRILOSEC) 20 MG DR capsule Take 1 capsule (20 mg) by mouth daily 90 capsule 3     simvastatin (ZOCOR) 40 MG tablet Take 1 tablet (40 mg) by mouth At Bedtime 90 tablet 3       Allergies:  No known drug allergies.    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 Has 7 grown children, many of whom are available for assistance.  Social History     Tobacco Use     Smoking status: Former Smoker     Packs/day: 0.60     Years: 30.00     Pack " "years: 18.00     Types: Cigarettes     Last attempt to quit: 1987     Years since quittin.6     Smokeless tobacco: Current User     Types: Chew   Substance Use Topics     Alcohol use: Yes     Alcohol/week: 10.0 standard drinks     Types: 10 Cans of beer per week     Frequency: 4 or more times a week     Drinks per session: 1 or 2     Drug use: No       Family History: Maternal uncle had esophageal cancer (complicated course). Paternal grandmother had \"throat cancer\".    Review of Systems (supplemental)   A 10-point review of systems was performed. Pertinent findings are noted in the HPI.    Physical Exam   KPS: 70.  ECOG Status: 1 (Restricted in physically strenuous activity but ambulatory and able to carry out work of a light or sedentary nature)  2 (Ambulatory and capable of all selfcare but unable to carry out any work activities; may need help with IADLs up and about > 50% of waking hours).  Vitals: BP (!) 152/74 (BP Location: Left arm, Patient Position: Chair, Cuff Size: Adult Regular)   Pulse 84   Temp 97.4  F (36.3  C) (Tympanic)   Resp 16   Ht 1.651 m (5' 5\")   Wt 75.2 kg (165 lb 11.2 oz)   BMI 27.57 kg/m  .  Pain: 0/10 when not eating.    Physical Exam:  General: Appears well. Appears naturally thin and fit. Non-obese. No gross acute weight loss. Clothes fit well. Appears slightly fatigued. Appears stated age. Appears in good general health, well developed, well nourished, and in no acute distress. Does not appear acutely or chronically ill. Appears well kempt and groomed.  Head: No alopecia. Normocephalic, atraumatic.  Eyes: No conjunctiva injection, sclera injection, or jaundice.  ENMT: Mild hearing impairment. No hearing aids. Normal colored lips. Symmetric lips.  Neck: Soft, supple, trachea midline.  Nodes: No palpable cervical, supraclavicular, or infraclavicular adenopathy.  Chest: No chest abnormalities.  Heart: Regular rate and rhythm, S1, S2.  Lungs: Easy respirations. Clear to " auscultation. Good air exchange. No delay in phases. No use of accessory musculature.  Abdomen: Positive bowel sounds. Soft, nontender, and nondistended.   /Pelvis: Positive bowel sounds. Soft, nontender, and nondistended.  Extremities: No cyanosis, clubbing, or edema. No cords or calf tenderness.   Integument: No gross cellulitis, atraumatic ecchymosis, bleeding, or jaundice.  MSK: Normal posture. Muscle tone appears good. No pain on palpation.  Back/Spine: Normal curvature and flexibility.  Neurologic: Slightly shuffled gait. Memory grossly intact but acknowledges short-term memory issues are present at times. Alert and oriented, fluent speech, rapid verbal responses, follows commands easily, cranial nerves grossly intact, no dysarthria or phasic errors, motor 5/5, and sensory grossly intact.  Psychiatric: Appropriately sad about disease. Confident questions and statements. Sharp, motivated, appropriately forceful. Good eye contact. Affect appropriate, pleasant, engaging, good comprehension, concrete, insightful, no perseveration, and no grandiosity. No redirection required. Asked appropriate questions and insightful follow-up questions.    Imaging/Path/Labs   Imaging: See HPI.  Path: See HPI.  Labs: See HPI and EMR.    Information Review   Face-to-face, the patient, daughter, and I reviewed his case. I evaluated him, and discussed treatment options and risks.I reviewed the medical records, radiographic information, and pathologic studies. I reviewed the imaging studies via PACS (I showed them the PET-CT). I reviewed the nursing and patient intake sheets. I reviewed the history with the patient and his family. I offered to speak withother family members and friends today by speakerphone. They declined my offer but granted me permission to speak with them if they contact me or come to clinic. The patient and his family are good historians. They began the consultation with good insights into the disease process  and acknowledged its potentially poor consequences. They educated themselves through a variety of educational media. His daughter has educated herself in depth through resources such as then Internet. They demonstrated good comprehension of our discussion and explored the treatment options with good understanding. They asked pertinent questions and insightful follow-up questions. I illustrated the basic anatomy and field of treatment. They declined referral for an additional opinion and declined conservative care. They previously accepted referral to our social work staff for additional resources including potential counseling. The patient granted usepermission to exchange medical information and records with other physicians.    Assessment    In summary, I concur with the recommendation of external beam radiation therapy as palliative treatment for this relatively healthy gentleman with symptomatic adenocarcinoma of the distal esophagus that has been deemed medically inoperable and medically unsuitable for chemotherapy. Among the goals of therapy would be to improve the patient's overall quality of his remaining life by decreasing odynophagia, reducing dysphasia, lessening pain, improving nutrition, preserving relative functional independence, and reducing the risk of morbid consequences of local progression such as fistula, hemorrhage, and infection. The patient and his daughter understand that radiation therapy is intended to offer temporary palliation and is not intended to prolong the patient's life significantly. If local symptoms eventually progress, other palliative interventions can be considered (e.g., stent, chemotherapy). Given the circumstances, I do not feel that endoscopic ultrasound staging is appropriate. They wished to proceed as recommended. We therefore obtained written consent from the patient and his daughter.    During our extended visit, the patient, daughter, and I discussed his diagnosis of  an early stage but medically incurable esophageal cancer. We reviewed the definition of the risk groups, the natural history of spread of esophageal cancer, patterns of failure after various treatments, and poor prognosis even with aggressive multimodality treatment. We discussed the limitations of radiographic studies such as CT or PET in identifying local, regional, and distant disease. We discussed factors specific to the patient s disease such as his co-morbidities, age, personal preferences, experiences of his uncle during his treatment for esophageal cancer, wishes of his daughter to avoid chemotherapy, and their desire to proceed at this time with palliative rather than potentially curative treatment. We reviewed the concept of multimodality care, its evolution in the treatment of esophageal cancer and the relative contribution of each modality to the palliative vs. Curative treatment paradigm. We discussed the beneficial role of radiation therapy in the context of the evolving standards of care and national guidelines such as the NCCN, ACR, and BERTRAND including guidance during the COVID-19 pandemic. We reviewed the indications for radiation therapy to the thoracic region, I do not feel that either close surveillance or delayed therapy is reasonable for this patient s clinical parameters.    We reviewed the nature of external beam radiation therapy from a DigePrint TrueBeam linac. We discussed the concepts of treatment of gross disease. We would simulate him on a Locaid CT simulator. After advanced computerized treatment planning, we would deliver treatment with multileaf collimation and perhaps image guidance. We likely would use either static-gantry intensity-modulated radiation therapy (IMRT) or volumetric modulated arc therapy (VMAT) RapidArc radiation therapy to provide an appropriate distribution of dose. These techniques permit good coverage of complex target tissues while maintaining adequate sparing of  adjacent normal critical structures such as the remaining esophagus, immediatly adjacent heart, lung, primary airways,spinal cord, lymphovascular structures, chest wall, skin, and other structures. I would not use protons, TomoTherapy, brachytherapy, or radioprotectant agents. I do not feel that these methods offer a clear benefit for the treatment lung cancer and the absence of complicating anatomical geometry or comorbid conditions. The patient appears to have no absolute contraindications to radiation therapy.    We discussed in detail the relative risks, benefits, rationale, potential side effects, alternatives, and adjuncts to radiation therapy for esophageal cancer. The side effects may be acute or chronic, and may be progressive, painful, and negatively impact the patient s long-term quality of life. They may require medical or surgical intervention. I anticipate at least a moderate degree of acute toxicity. The toxicities include but are not limited to compromises of the structures noted above and other organ systems nearby as well as systemic toxicities such as fatigue and long-term risks such as second malignancy. His diabetes, esophageal compromise, weight loss, ulcerative colitis, diverticular disease, and age are among the factors that can significantly increase the risk of toxicity. The patient and his daughter wished to proceed as noted below. We answered all questions to their satisfaction. Thank you for allowing me to participate in the care of this very pleasant patient.    Plan   1. Simulation:  Patient Given written appointment for simulation this Wednesday, 8/12/2020. The patient does not require any preparation for the procedure (i.e., can have normal meals, etc).  2. Radiation therapy:  I anticipate treating the patient in 15 daily fractions of radiation therapy using either 3-dimensional conformal radiation therapy or intensity-modulated radiation therapy. We placed no limitations or  restrictions on the patient during radiation therapy (i.e., can have normal meals and perform his typical daily activities). He does not require extra infectious disease precautions since radiation therapy is unlikely to cause significant marrow dysfunction or immunocompromise. However, dietary interventions and pain management might be required as treatment progresses.      Hero Ramirez MD, PhD

## 2020-08-10 NOTE — PROGRESS NOTES
[facility]  Nutrition Assessment    Vikas Ji    1940   Aug 10, 2020    Diagnosis: Esophageal cancer    Height: Data Unavailable Weight: 165 lbs 11.2 oz    Usual Weight: 175 Weight Change: -10 lbs over 1 year      Past Medical History:   Diagnosis Date     Hypomagnesemia     9/10/2015     Other abnormalities of gait and mobility     10/6/2015     Type 2 diabetes mellitus with hyperglycemia (H)     9/10/2015       1. Receiving Chemotherapy? No - 0 points  2. Weight Loss per Month (in pounds) Based on Usual Weight: <1lb/month    100# 100-120# 120-150# 150-200# >200# Pt. System   > 5 5 - 6 6 - 8 7 - 10 > 10 1 Point   > 7 7- 9 9 - 11 11 - 14 > 15 2 Points   > 10 10 - 12 12 - 15 15 - 20 > 20 3 Points       3. Eating Problems: ( 1 Point for Each Problem)       Loss of Appetite     Yes  - 1 point    Difficulty Swallowing    Yes  - 1 point   Nausea    No - 0 points    Early Satiety    No - 0 points   Vomiting    No - 0 points    Taste/Smell Aversions  No - 0 points    Diarrhea    No - 0 points    Esophageal Reflux   Yes  - 1 point   Mouth Soreness/Difficulty Chewing  No - 0 points          Total: 3    4.  Automatic Referral for the Following Diagnosis or Situations: Esophageal Cancer - Dietician Consult ordered     Comments: n/a    Total Score: 3 (Scores >/= 4 will receive a Dietician Consult)        Fouzia Jones RN

## 2020-08-11 ENCOUNTER — OFFICE VISIT (OUTPATIENT)
Dept: FAMILY MEDICINE | Facility: OTHER | Age: 80
End: 2020-08-11
Attending: FAMILY MEDICINE
Payer: COMMERCIAL

## 2020-08-11 VITALS
RESPIRATION RATE: 16 BRPM | TEMPERATURE: 97.8 F | HEIGHT: 65 IN | WEIGHT: 163.6 LBS | DIASTOLIC BLOOD PRESSURE: 62 MMHG | BODY MASS INDEX: 27.26 KG/M2 | HEART RATE: 92 BPM | SYSTOLIC BLOOD PRESSURE: 134 MMHG

## 2020-08-11 DIAGNOSIS — H61.23 BILATERAL IMPACTED CERUMEN: Primary | ICD-10-CM

## 2020-08-11 PROCEDURE — G0463 HOSPITAL OUTPT CLINIC VISIT: HCPCS

## 2020-08-11 PROCEDURE — 99213 OFFICE O/P EST LOW 20 MIN: CPT | Performed by: FAMILY MEDICINE

## 2020-08-11 PROCEDURE — G0463 HOSPITAL OUTPT CLINIC VISIT: HCPCS | Mod: 25

## 2020-08-11 ASSESSMENT — MIFFLIN-ST. JEOR: SCORE: 1373.96

## 2020-08-11 ASSESSMENT — PAIN SCALES - GENERAL: PAINLEVEL: NO PAIN (0)

## 2020-08-11 NOTE — NURSING NOTE
Patient here to have ears flushed prior to hearing aide appointment next week. Medication Reconciliation: complete.    Conchis Pugh LPN  8/11/2020 1:33 PM

## 2020-08-12 ENCOUNTER — ALLIED HEALTH/NURSE VISIT (OUTPATIENT)
Dept: RADIATION ONCOLOGY | Facility: HOSPITAL | Age: 80
End: 2020-08-12
Attending: RADIOLOGY
Payer: COMMERCIAL

## 2020-08-12 DIAGNOSIS — C15.5 PRIMARY ADENOCARCINOMA OF DISTAL THIRD OF ESOPHAGUS (H): Primary | ICD-10-CM

## 2020-08-12 PROCEDURE — 77263 THER RADIOLOGY TX PLNG CPLX: CPT | Performed by: RADIOLOGY

## 2020-08-12 PROCEDURE — 77334 RADIATION TREATMENT AID(S): CPT | Mod: 26 | Performed by: RADIOLOGY

## 2020-08-12 PROCEDURE — 77290 THER RAD SIMULAJ FIELD CPLX: CPT | Performed by: RADIOLOGY

## 2020-08-12 PROCEDURE — 77290 THER RAD SIMULAJ FIELD CPLX: CPT | Mod: 26 | Performed by: RADIOLOGY

## 2020-08-12 PROCEDURE — 77334 RADIATION TREATMENT AID(S): CPT | Performed by: RADIOLOGY

## 2020-08-12 NOTE — PROGRESS NOTES
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 The patient is felt to be medically inoperable and medically not a candidate for chemotherapy. Among his pertinent comorbidities and circumstances are \"mild dementia and forgetfulness\" (children drive him), chronic mild hearing loss (hearing aids pending), \"ulcerative colitis\" (carries the diagnosis but not noted on recent colonoscopy), diverticular disease, type 2 diabetes mellitus that requires metformin as well as insulin, and significant diabetic peripheral neuropathy. His daughter is an oncology nurse, and his maternal uncle  of esophageal cancer in the  (difficult demise).    Procedure:  The patient comes to clinic for simulation and radiation therapy for palliation of clinically incurable esophageal cancer. He felt relatively well and offered no new complaints. We counseled the patient about the potential impact of COVID-19 on his treatment. He again denied known exposure to COVID-19, risky behavior, or related symptoms including febrile, chest, gustatory, and systemic complaints.    With the patient, we verified his identification, consent, site, and side of treatment. KPS: 70. On examination, he appeared stable and in no acute distress. ENT had normal lip color and no " hoarseness. Lungs had no cough, easy respirations that were regular, unlabored, did not nose of accessory musculature. Neuro was alert, oriented, nonfocal. Psych was pleasant, cooperative, insightful, concrete.    We evaluated multiple positions for setup and treatment, and elected to simulate the patient in a modified supine position. We used orthogonal lasers to align he with the CT simulator. We immobilized the patient with a customized torso mold to improve the reproducibility and safety for daily radiation therapy. We placed radiopaque markers to assist in identifying topographical landmarks for simulation. We obtained  and axial CT imaging through the chest. Therapy, treatment planning, and I used virtual simulation techniques to verify the adequacy of the CT images and to create a preliminary isocenter for setup of treatment. We used tattoos to ezio that isocenter on the patient's torso. He tolerated the procedure well and without complications.    We will use available diagnostic (e.g., PET-CT fusion) and radiation therapy imaging studies for CT-based treatment planning. I anticipate utilizing a form of 3-dimension conformal radiation therapy or 2-D therapy  to develop a computerized treatment plan whose dosimetric analysis (e.g., dose-volume histogram (DVH)) indicates adequate coverage of target tissues and sparing of nearby normal structures (e.g., spinal cord, heart, lungs). We will complete routine QA procedures. I do not anticipate the use of a boost/cone down plan. The patient wished to proceed as recommended. We answered all questions to his satisfaction.      Hero Ramirez MD, PhD

## 2020-08-12 NOTE — PROGRESS NOTES
SUBJECTIVE:   Vikas Ji is a 80 year old male who presents to clinic today for the following health issues: Ear cleaning    Patient arrives here for ear cleaning up.  He is in the process of getting hearing aids.  And is in need of cleaning of the ears prior to hearing aid placement.        Patient Active Problem List    Diagnosis Date Noted     Adenocarcinoma of esophagus (H) 07/02/2020     Priority: Medium     Change in bowel habits 03/11/2020     Priority: Medium     Other dysphagia 03/11/2020     Priority: Medium     Difficulty urinating 03/11/2020     Priority: Medium     Chewing tobacco use 12/18/2019     Priority: Medium     Vitamin B12 deficiency 09/17/2019     Priority: Medium     Memory problem 09/17/2019     Priority: Medium     Benign essential hypertension 11/28/2018     Priority: Medium     Mixed hyperlipidemia 11/28/2018     Priority: Medium     Balance problems 10/06/2015     Priority: Medium     Generalized weakness 10/06/2015     Priority: Medium     Controlled type 2 diabetes mellitus without complication, with long-term current use of insulin (H) 09/10/2015     Priority: Medium     History of tobacco use 09/10/2015     Priority: Medium     Low blood magnesium level 09/10/2015     Priority: Medium     Cough 11/28/2012     Priority: Medium     Inguinal hernia, right 04/19/2011     Priority: Medium     Diverticulosis of large intestine 10/18/2010     Priority: Medium     Personal history of other diseases of digestive system 10/18/2010     Priority: Medium     Past Medical History:   Diagnosis Date     Dementia (H)      HLD (hyperlipidemia)      HTN (hypertension)      Hypomagnesemia     9/10/2015     Other abnormalities of gait and mobility     10/6/2015     Type 2 diabetes mellitus with hyperglycemia (H)     9/10/2015      Past Surgical History:   Procedure Laterality Date     COLONOSCOPY  09/27/2010 2010,F/U 2020  Ulcerative colitis     COLONOSCOPY N/A 6/30/2020    Procedure:  "COLONOSCOPY, WITH POLYPECTOMY AND BIOPSY;  Surgeon: Eliseo Arshad MD;  Location:  OR     ESOPHAGOSCOPY, GASTROSCOPY, DUODENOSCOPY (EGD), COMBINED N/A 6/30/2020    Procedure: ESOPHAGOGASTRODUODENOSCOPY, WITH BIOPSY;  Surgeon: Eliseo Arshad MD;  Location:  OR       Review of Systems     OBJECTIVE:     /62   Pulse 92   Temp 97.8  F (36.6  C)   Resp 16   Ht 1.651 m (5' 5\")   Wt 74.2 kg (163 lb 9.6 oz)   BMI 27.22 kg/m    Body mass index is 27.22 kg/m .  Physical Exam  Constitutional:       Appearance: Normal appearance.   HENT:      Ears:      Comments: Cerumen impaction bilateral  Neurological:      Mental Status: He is alert.   Psychiatric:         Mood and Affect: Mood normal.         Diagnostic Test Results:  none     ASSESSMENT/PLAN:         1. Bilateral impacted cerumen  Patient is cerumen impaction bilateral.  I attempted to lavage it out along with nursing staff without success.  Patient is instructed to try Debrox at home and then flush it out himself in the shower.  Instructions given to him and his wife.  They are aware that they should not plug the ear canal.        Jorge Baumann MD  [facility]  "

## 2020-08-14 PROCEDURE — 77295 3-D RADIOTHERAPY PLAN: CPT | Mod: 26 | Performed by: RADIOLOGY

## 2020-08-14 PROCEDURE — 77300 RADIATION THERAPY DOSE PLAN: CPT | Performed by: RADIOLOGY

## 2020-08-14 PROCEDURE — 77300 RADIATION THERAPY DOSE PLAN: CPT | Mod: 26 | Performed by: RADIOLOGY

## 2020-08-14 PROCEDURE — 77334 RADIATION TREATMENT AID(S): CPT | Performed by: RADIOLOGY

## 2020-08-14 PROCEDURE — 77295 3-D RADIOTHERAPY PLAN: CPT | Performed by: RADIOLOGY

## 2020-08-14 PROCEDURE — 77334 RADIATION TREATMENT AID(S): CPT | Mod: 26 | Performed by: RADIOLOGY

## 2020-08-15 ENCOUNTER — HOSPITAL ENCOUNTER (OUTPATIENT)
Facility: OTHER | Age: 80
Setting detail: OBSERVATION
Discharge: HOME | End: 2020-08-16
Attending: STUDENT IN AN ORGANIZED HEALTH CARE EDUCATION/TRAINING PROGRAM | Admitting: INTERNAL MEDICINE
Payer: COMMERCIAL

## 2020-08-15 DIAGNOSIS — Z79.82 ENCOUNTER FOR LONG-TERM (CURRENT) USE OF ASPIRIN: ICD-10-CM

## 2020-08-15 DIAGNOSIS — E16.2 HYPOGLYCEMIA: ICD-10-CM

## 2020-08-15 DIAGNOSIS — F03.90 DEMENTIA WITHOUT BEHAVIORAL DISTURBANCE, UNSPECIFIED DEMENTIA TYPE: ICD-10-CM

## 2020-08-15 DIAGNOSIS — C15.9 ADENOCARCINOMA OF ESOPHAGUS (H): ICD-10-CM

## 2020-08-15 DIAGNOSIS — I10 BENIGN ESSENTIAL HYPERTENSION: ICD-10-CM

## 2020-08-15 DIAGNOSIS — Z79.4 ENCOUNTER FOR LONG-TERM (CURRENT) USE OF INSULIN (H): ICD-10-CM

## 2020-08-15 DIAGNOSIS — Z79.899 ENCOUNTER FOR LONG-TERM (CURRENT) USE OF OTHER MEDICATIONS: ICD-10-CM

## 2020-08-15 DIAGNOSIS — E11.649 DIABETIC HYPOGLYCEMIA (H): ICD-10-CM

## 2020-08-15 PROCEDURE — 96366 THER/PROPH/DIAG IV INF ADDON: CPT | Performed by: STUDENT IN AN ORGANIZED HEALTH CARE EDUCATION/TRAINING PROGRAM

## 2020-08-15 PROCEDURE — 96365 THER/PROPH/DIAG IV INF INIT: CPT | Performed by: STUDENT IN AN ORGANIZED HEALTH CARE EDUCATION/TRAINING PROGRAM

## 2020-08-15 PROCEDURE — 93005 ELECTROCARDIOGRAM TRACING: CPT | Performed by: STUDENT IN AN ORGANIZED HEALTH CARE EDUCATION/TRAINING PROGRAM

## 2020-08-15 PROCEDURE — 99285 EMERGENCY DEPT VISIT HI MDM: CPT | Mod: 25 | Performed by: STUDENT IN AN ORGANIZED HEALTH CARE EDUCATION/TRAINING PROGRAM

## 2020-08-15 PROCEDURE — 99285 EMERGENCY DEPT VISIT HI MDM: CPT | Mod: Z6 | Performed by: STUDENT IN AN ORGANIZED HEALTH CARE EDUCATION/TRAINING PROGRAM

## 2020-08-16 ENCOUNTER — APPOINTMENT (OUTPATIENT)
Dept: CT IMAGING | Facility: OTHER | Age: 80
End: 2020-08-16
Attending: STUDENT IN AN ORGANIZED HEALTH CARE EDUCATION/TRAINING PROGRAM
Payer: COMMERCIAL

## 2020-08-16 ENCOUNTER — APPOINTMENT (OUTPATIENT)
Dept: GENERAL RADIOLOGY | Facility: OTHER | Age: 80
End: 2020-08-16
Attending: INTERNAL MEDICINE
Payer: COMMERCIAL

## 2020-08-16 VITALS
BODY MASS INDEX: 27.55 KG/M2 | DIASTOLIC BLOOD PRESSURE: 68 MMHG | HEIGHT: 65 IN | TEMPERATURE: 97.9 F | SYSTOLIC BLOOD PRESSURE: 156 MMHG | OXYGEN SATURATION: 94 % | WEIGHT: 165.34 LBS | HEART RATE: 83 BPM | RESPIRATION RATE: 16 BRPM

## 2020-08-16 PROBLEM — E16.2 HYPOGLYCEMIA: Status: ACTIVE | Noted: 2020-08-16

## 2020-08-16 LAB
ALBUMIN SERPL-MCNC: 4.3 G/DL (ref 3.5–5.7)
ALP SERPL-CCNC: 36 U/L (ref 34–104)
ALT SERPL W P-5'-P-CCNC: 10 U/L (ref 7–52)
ANION GAP SERPL CALCULATED.3IONS-SCNC: 10 MMOL/L (ref 3–14)
ANION GAP SERPL CALCULATED.3IONS-SCNC: 5 MMOL/L (ref 3–14)
AST SERPL W P-5'-P-CCNC: 16 U/L (ref 13–39)
BASOPHILS # BLD AUTO: 0.1 10E9/L (ref 0–0.2)
BASOPHILS NFR BLD AUTO: 0.9 %
BILIRUB SERPL-MCNC: 0.4 MG/DL (ref 0.3–1)
BUN SERPL-MCNC: 15 MG/DL (ref 7–25)
BUN SERPL-MCNC: 17 MG/DL (ref 7–25)
CALCIUM SERPL-MCNC: 9.2 MG/DL (ref 8.6–10.3)
CALCIUM SERPL-MCNC: 9.6 MG/DL (ref 8.6–10.3)
CHLORIDE SERPL-SCNC: 102 MMOL/L (ref 98–107)
CHLORIDE SERPL-SCNC: 97 MMOL/L (ref 98–107)
CO2 SERPL-SCNC: 24 MMOL/L (ref 21–31)
CO2 SERPL-SCNC: 25 MMOL/L (ref 21–31)
CREAT SERPL-MCNC: 0.75 MG/DL (ref 0.7–1.3)
CREAT SERPL-MCNC: 0.94 MG/DL (ref 0.7–1.3)
DIFFERENTIAL METHOD BLD: ABNORMAL
EOSINOPHIL # BLD AUTO: 0.4 10E9/L (ref 0–0.7)
EOSINOPHIL NFR BLD AUTO: 5.7 %
ERYTHROCYTE [DISTWIDTH] IN BLOOD BY AUTOMATED COUNT: 12.5 % (ref 10–15)
ERYTHROCYTE [DISTWIDTH] IN BLOOD BY AUTOMATED COUNT: 12.5 % (ref 10–15)
GFR SERPL CREATININE-BSD FRML MDRD: 77 ML/MIN/{1.73_M2}
GFR SERPL CREATININE-BSD FRML MDRD: ABNORMAL ML/MIN/{1.73_M2}
GLUCOSE SERPL-MCNC: 103 MG/DL (ref 70–105)
GLUCOSE SERPL-MCNC: 187 MG/DL (ref 70–105)
HBA1C MFR BLD: 5.8 % (ref 4–6)
HCT VFR BLD AUTO: 38.5 % (ref 40–53)
HCT VFR BLD AUTO: 38.9 % (ref 40–53)
HGB BLD-MCNC: 13.2 G/DL (ref 13.3–17.7)
HGB BLD-MCNC: 13.3 G/DL (ref 13.3–17.7)
IMM GRANULOCYTES # BLD: 0 10E9/L (ref 0–0.4)
IMM GRANULOCYTES NFR BLD: 0.2 %
LYMPHOCYTES # BLD AUTO: 1.3 10E9/L (ref 0.8–5.3)
LYMPHOCYTES NFR BLD AUTO: 20.3 %
MCH RBC QN AUTO: 31.7 PG (ref 26.5–33)
MCH RBC QN AUTO: 31.9 PG (ref 26.5–33)
MCHC RBC AUTO-ENTMCNC: 34.2 G/DL (ref 31.5–36.5)
MCHC RBC AUTO-ENTMCNC: 34.3 G/DL (ref 31.5–36.5)
MCV RBC AUTO: 93 FL (ref 78–100)
MCV RBC AUTO: 93 FL (ref 78–100)
MONOCYTES # BLD AUTO: 0.5 10E9/L (ref 0–1.3)
MONOCYTES NFR BLD AUTO: 7.9 %
NEUTROPHILS # BLD AUTO: 4.1 10E9/L (ref 1.6–8.3)
NEUTROPHILS NFR BLD AUTO: 65 %
PLATELET # BLD AUTO: 189 10E9/L (ref 150–450)
PLATELET # BLD AUTO: 195 10E9/L (ref 150–450)
POTASSIUM SERPL-SCNC: 3.9 MMOL/L (ref 3.5–5.1)
POTASSIUM SERPL-SCNC: 4.3 MMOL/L (ref 3.5–5.1)
PROT SERPL-MCNC: 6.5 G/DL (ref 6.4–8.9)
RBC # BLD AUTO: 4.14 10E12/L (ref 4.4–5.9)
RBC # BLD AUTO: 4.2 10E12/L (ref 4.4–5.9)
SODIUM SERPL-SCNC: 131 MMOL/L (ref 134–144)
SODIUM SERPL-SCNC: 132 MMOL/L (ref 134–144)
WBC # BLD AUTO: 6.4 10E9/L (ref 4–11)
WBC # BLD AUTO: 6.4 10E9/L (ref 4–11)

## 2020-08-16 PROCEDURE — 85025 COMPLETE CBC W/AUTO DIFF WBC: CPT | Performed by: STUDENT IN AN ORGANIZED HEALTH CARE EDUCATION/TRAINING PROGRAM

## 2020-08-16 PROCEDURE — 71045 X-RAY EXAM CHEST 1 VIEW: CPT

## 2020-08-16 PROCEDURE — 83036 HEMOGLOBIN GLYCOSYLATED A1C: CPT | Performed by: INTERNAL MEDICINE

## 2020-08-16 PROCEDURE — 36415 COLL VENOUS BLD VENIPUNCTURE: CPT | Performed by: STUDENT IN AN ORGANIZED HEALTH CARE EDUCATION/TRAINING PROGRAM

## 2020-08-16 PROCEDURE — 25000132 ZZH RX MED GY IP 250 OP 250 PS 637: Performed by: INTERNAL MEDICINE

## 2020-08-16 PROCEDURE — 93010 ELECTROCARDIOGRAM REPORT: CPT | Performed by: INTERNAL MEDICINE

## 2020-08-16 PROCEDURE — 85027 COMPLETE CBC AUTOMATED: CPT | Mod: XU | Performed by: INTERNAL MEDICINE

## 2020-08-16 PROCEDURE — G0378 HOSPITAL OBSERVATION PER HR: HCPCS

## 2020-08-16 PROCEDURE — 25000131 ZZH RX MED GY IP 250 OP 636 PS 637: Performed by: INTERNAL MEDICINE

## 2020-08-16 PROCEDURE — 96372 THER/PROPH/DIAG INJ SC/IM: CPT | Mod: XU

## 2020-08-16 PROCEDURE — 72125 CT NECK SPINE W/O DYE: CPT | Mod: TC

## 2020-08-16 PROCEDURE — 99236 HOSP IP/OBS SAME DATE HI 85: CPT | Performed by: INTERNAL MEDICINE

## 2020-08-16 PROCEDURE — 80048 BASIC METABOLIC PNL TOTAL CA: CPT | Performed by: INTERNAL MEDICINE

## 2020-08-16 PROCEDURE — 70450 CT HEAD/BRAIN W/O DYE: CPT | Mod: TC

## 2020-08-16 PROCEDURE — 82947 ASSAY GLUCOSE BLOOD QUANT: CPT | Performed by: STUDENT IN AN ORGANIZED HEALTH CARE EDUCATION/TRAINING PROGRAM

## 2020-08-16 PROCEDURE — 96365 THER/PROPH/DIAG IV INF INIT: CPT | Performed by: STUDENT IN AN ORGANIZED HEALTH CARE EDUCATION/TRAINING PROGRAM

## 2020-08-16 PROCEDURE — 25800030 ZZH RX IP 258 OP 636: Performed by: STUDENT IN AN ORGANIZED HEALTH CARE EDUCATION/TRAINING PROGRAM

## 2020-08-16 PROCEDURE — 96366 THER/PROPH/DIAG IV INF ADDON: CPT | Performed by: STUDENT IN AN ORGANIZED HEALTH CARE EDUCATION/TRAINING PROGRAM

## 2020-08-16 PROCEDURE — 80053 COMPREHEN METABOLIC PANEL: CPT | Performed by: STUDENT IN AN ORGANIZED HEALTH CARE EDUCATION/TRAINING PROGRAM

## 2020-08-16 PROCEDURE — 36415 COLL VENOUS BLD VENIPUNCTURE: CPT | Performed by: INTERNAL MEDICINE

## 2020-08-16 RX ORDER — PROCHLORPERAZINE MALEATE 5 MG
5 TABLET ORAL EVERY 6 HOURS PRN
Status: DISCONTINUED | OUTPATIENT
Start: 2020-08-16 | End: 2020-08-16 | Stop reason: HOSPADM

## 2020-08-16 RX ORDER — ONDANSETRON 4 MG/1
4 TABLET, ORALLY DISINTEGRATING ORAL EVERY 6 HOURS PRN
Status: DISCONTINUED | OUTPATIENT
Start: 2020-08-16 | End: 2020-08-16 | Stop reason: HOSPADM

## 2020-08-16 RX ORDER — NALOXONE HYDROCHLORIDE 0.4 MG/ML
.1-.4 INJECTION, SOLUTION INTRAMUSCULAR; INTRAVENOUS; SUBCUTANEOUS
Status: DISCONTINUED | OUTPATIENT
Start: 2020-08-16 | End: 2020-08-16 | Stop reason: HOSPADM

## 2020-08-16 RX ORDER — LOSARTAN POTASSIUM 50 MG/1
100 TABLET ORAL DAILY
Status: DISCONTINUED | OUTPATIENT
Start: 2020-08-16 | End: 2020-08-16 | Stop reason: HOSPADM

## 2020-08-16 RX ORDER — ONDANSETRON 2 MG/ML
4 INJECTION INTRAMUSCULAR; INTRAVENOUS EVERY 6 HOURS PRN
Status: DISCONTINUED | OUTPATIENT
Start: 2020-08-16 | End: 2020-08-16 | Stop reason: HOSPADM

## 2020-08-16 RX ORDER — NICOTINE POLACRILEX 4 MG
15-30 LOZENGE BUCCAL
Status: DISCONTINUED | OUTPATIENT
Start: 2020-08-16 | End: 2020-08-16 | Stop reason: HOSPADM

## 2020-08-16 RX ORDER — AMOXICILLIN 250 MG
1 CAPSULE ORAL 2 TIMES DAILY PRN
Status: DISCONTINUED | OUTPATIENT
Start: 2020-08-16 | End: 2020-08-16 | Stop reason: HOSPADM

## 2020-08-16 RX ORDER — POTASSIUM CHLORIDE 1500 MG/1
20-40 TABLET, EXTENDED RELEASE ORAL
Status: DISCONTINUED | OUTPATIENT
Start: 2020-08-16 | End: 2020-08-16 | Stop reason: HOSPADM

## 2020-08-16 RX ORDER — ASPIRIN 81 MG/1
81 TABLET, CHEWABLE ORAL ONCE
Status: COMPLETED | OUTPATIENT
Start: 2020-08-16 | End: 2020-08-16

## 2020-08-16 RX ORDER — POTASSIUM CHLORIDE 7.45 MG/ML
10 INJECTION INTRAVENOUS
Status: DISCONTINUED | OUTPATIENT
Start: 2020-08-16 | End: 2020-08-16 | Stop reason: HOSPADM

## 2020-08-16 RX ORDER — LIDOCAINE 40 MG/G
CREAM TOPICAL
Status: DISCONTINUED | OUTPATIENT
Start: 2020-08-16 | End: 2020-08-16 | Stop reason: HOSPADM

## 2020-08-16 RX ORDER — DEXTROSE MONOHYDRATE 25 G/50ML
25-50 INJECTION, SOLUTION INTRAVENOUS
Status: DISCONTINUED | OUTPATIENT
Start: 2020-08-16 | End: 2020-08-16 | Stop reason: HOSPADM

## 2020-08-16 RX ORDER — MAGNESIUM SULFATE HEPTAHYDRATE 40 MG/ML
4 INJECTION, SOLUTION INTRAVENOUS EVERY 4 HOURS PRN
Status: DISCONTINUED | OUTPATIENT
Start: 2020-08-16 | End: 2020-08-16 | Stop reason: HOSPADM

## 2020-08-16 RX ORDER — PROCHLORPERAZINE 25 MG
12.5 SUPPOSITORY, RECTAL RECTAL EVERY 12 HOURS PRN
Status: DISCONTINUED | OUTPATIENT
Start: 2020-08-16 | End: 2020-08-16 | Stop reason: HOSPADM

## 2020-08-16 RX ORDER — ACETAMINOPHEN 325 MG/1
650 TABLET ORAL EVERY 4 HOURS PRN
Status: DISCONTINUED | OUTPATIENT
Start: 2020-08-16 | End: 2020-08-16 | Stop reason: HOSPADM

## 2020-08-16 RX ORDER — AMOXICILLIN 250 MG
2 CAPSULE ORAL 2 TIMES DAILY PRN
Status: DISCONTINUED | OUTPATIENT
Start: 2020-08-16 | End: 2020-08-16 | Stop reason: HOSPADM

## 2020-08-16 RX ADMIN — DEXTROSE AND SODIUM CHLORIDE: 5; 900 INJECTION, SOLUTION INTRAVENOUS at 00:57

## 2020-08-16 RX ADMIN — ASPIRIN 81 MG 81 MG: 81 TABLET ORAL at 08:45

## 2020-08-16 RX ADMIN — LOSARTAN POTASSIUM 100 MG: 50 TABLET, FILM COATED ORAL at 09:50

## 2020-08-16 RX ADMIN — INSULIN ASPART 1 UNITS: 100 INJECTION, SOLUTION INTRAVENOUS; SUBCUTANEOUS at 08:45

## 2020-08-16 ASSESSMENT — ACTIVITIES OF DAILY LIVING (ADL)
TRANSFERRING: 0-->INDEPENDENT
COGNITION: 0 - NO COGNITION ISSUES REPORTED
ADLS_ACUITY_SCORE: 14
TOILETING: 0-->INDEPENDENT
SWALLOWING: 0-->SWALLOWS FOODS/LIQUIDS WITHOUT DIFFICULTY
AMBULATION: 0-->INDEPENDENT
RETIRED_COMMUNICATION: 0-->UNDERSTANDS/COMMUNICATES WITHOUT DIFFICULTY
NUMBER_OF_TIMES_PATIENT_HAS_FALLEN_WITHIN_LAST_SIX_MONTHS: 1
ADLS_ACUITY_SCORE: 14
RETIRED_EATING: 0-->INDEPENDENT
FALL_HISTORY_WITHIN_LAST_SIX_MONTHS: YES
DRESS: 0-->INDEPENDENT
BATHING: 0-->INDEPENDENT

## 2020-08-16 ASSESSMENT — MIFFLIN-ST. JEOR
SCORE: 1371.24
SCORE: 1381.88

## 2020-08-16 NOTE — ED NOTES
Pt admitted / transferred to room 306 on medical floor. Report was called and given to HARSH Cabrera and HARSH Cabrera also updated again at . Pt IVF infusing as per orders on arrival to Medical floor room 306. Pt is Alert and stable upon handoff to HARSH Cabrera.  Pt belongings transferred with Pt to room 306. Pt dentures in his mouth.  Pt and HARSH Cabrera both deny further needs and or need for further information at this time.

## 2020-08-16 NOTE — ED PROVIDER NOTES
Patient:  Vikas Ji  MRN: [id number] : 1940  Date of Service:  20      Subjective:  HPI:  Vikas Ji is a 80 year old male with a PMH of DM2, HTN, esophageal adenocarcinoma, UC presenting to the ED with cc of collapse witnessed by his wife at home.  Patient reportedly fell from standing striking his head on the ground.  On EMS arrival, the patient's blood sugar was noted to be 19.  Per chart review, it appears that he uses Lantus in the morning but in the evening takes glipizide as well as metformin.  The patient, who is quite hard of hearing, denies any preceding symptoms of lightheadedness, palpitations, chest pain, shortness of breath is currently reporting no symptoms of dizziness, lightheadedness, or weakness.  He received an amp of D50 prehospital and repeat blood sugar was greater than 100.  He denies any recent infectious symptoms including fevers or chills, nausea or vomiting, diarrhea.  He reports good p.o. intake recently.  No other symptoms or complaints.    ROS:  10 point ROS is negative except as listed above in the HPI.    PMH/PSH:   Past Medical History:   Diagnosis Date     Dementia (H)      HLD (hyperlipidemia)      HTN (hypertension)      Hypomagnesemia     9/10/2015     Other abnormalities of gait and mobility     10/6/2015     Type 2 diabetes mellitus with hyperglycemia (H)     9/10/2015     Past Surgical History:   Procedure Laterality Date     COLONOSCOPY  2010,F/U   Ulcerative colitis     COLONOSCOPY N/A 2020    Procedure: COLONOSCOPY, WITH POLYPECTOMY AND BIOPSY;  Surgeon: Eliseo Arshad MD;  Location:  OR     ESOPHAGOSCOPY, GASTROSCOPY, DUODENOSCOPY (EGD), COMBINED N/A 2020    Procedure: ESOPHAGOGASTRODUODENOSCOPY, WITH BIOPSY;  Surgeon: Eliseo Arshad MD;  [facility]       [facility]   Family History   Problem Relation Age of Onset     No Known Problems Mother      No Known Problems Father      Throat cancer Maternal  "Grandmother        SOC:    Relationships   Social connections     Talks on phone: Not on file     Gets together: Not on file     Attends Sikh service: Not on file     Active member of club or organization: Not on file     Attends meetings of clubs or organizations: Not on file     Relationship status: Not on file       ALL:  No Known Allergies    Meds:   No current facility-administered medications for this encounter.      Current Outpatient Medications   Medication     aspirin 81 MG chewable tablet     calcium & magnesium carbonates 311-232 MG TABS     glipiZIDE (GLUCOTROL) 10 MG tablet     insulin glargine (LANTUS VIAL) 100 UNIT/ML vial     losartan (COZAAR) 100 MG tablet     metFORMIN (GLUCOPHAGE) 1000 MG tablet     Multiple Vitamin (MULTI-VITAMINS) TABS     omeprazole (PRILOSEC) 20 MG DR capsule     simvastatin (ZOCOR) 40 MG tablet         Objective:  VS:   BP (!) 161/79   Pulse 88   Temp 97.5  F (36.4  C) (Tympanic)   Resp 18   Ht 1.651 m (5' 5\")   Wt 73.9 kg (163 lb)   SpO2 99%   BMI 27.12 kg/m       Physical Exam:     Gen:  Alert, nontoxic, NAD.    HEENT:  Normocephalic, PERRLA. No contusion/scalp hematoma.   Neck:  Trachea midline, supple. no midline C-spine tenderness to palpation or external evidence of trauma. Pt did not arrive in a c-collar and is fully ranging his neck without pain.   Lungs:  Normal WOB. No distress.   Cardio:  Regular, s1/s2. Pulses intact.   Abd:  Soft, nontender, no rebound or guarding.   Ext:  No peripheral edema.  No obvious trauma.   Neuro:  A&Ox4, CN II-XII grossly intact.  Ambulatory with normal gait.     MSK: no obvious trauma.  Normal ROM of major joints.     Skin: warm, dry, no obvious rash.      Medical Decision Making:  This well appearing 80-year-old gentleman with history of diabetes on multiple anti-hyperglycemics presents for collapse with hypoglycemia noted on scene to 19.  Differential diagnosis considered includes medication induced hypoglycemia, syncope, " head trauma, infection, ACS, others.    Pt is AF and HDS. Well appearing on exam with normal neuro exam other than extreme difficulty hearing which is baseline per pt over the past year.  He has no complaints of chest pain, shortness of breath, vertigo, lightheadedness, or weakness now.  His neuro exam is normal and nonfocal.  Blood sugar rechecked here is greater than 100.  He is not diaphoretic or nauseous.  In reviewing the patient's medications it appears he takes glipizide and metformin in the evening and I have strong suspicion that this is what led to his hypoglycemic episode although I am unclear if he took extra doses this evening as is the patient because his wife gives him his medications.  The patient is unable to provide really any history helpful in making this diagnosis although he does not appear to have any symptoms at this time.  Will obtain EKG, basic labs, and observe for several hours in the emergency department for repeat hypoglycemia.    Labs are notable for , mild hyponatremia to 131, and essentially normal CBC.     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     CT head/C-spine negative.     Pt had recurrent quickly downtrending BG requiring D5 ggt. Strongly suspect inadvertent duplication of nighttime meds which has happened before per son given pt did eat this evening and profound level of hypoglycemia. Workup as above otherwise unrevealing. Admitted to hospitalist overnight for D5 ggt, Q1H BG checks, and repeat eval in AM. Pt's son is here and both pt and son agree with this plan.    Final Clinical Impression:  (E16.2) Hypoglycemia      PROCEDURES:  None    Daljit Serrano MD  8/16/2020  12:14 AM  Emergency Medicine Physician

## 2020-08-16 NOTE — ED TRIAGE NOTES
Pt here by DR mcarthur into [facility], pt collapsed in front of his family at home, pt hit his head, but denies any pain, EMS arrived and BG was 19, pt received d50 and BG improved and pt is more oriented and alert now, VSS

## 2020-08-16 NOTE — PROGRESS NOTES
" NSG ADMISSION NOTE    Patient admitted to room 306 at approximately 0225 via bed from emergency room. Patient was accompanied by nurse.     Verbal SBAR report received from HARSH Merino prior to patient arrival.     Patient ambulated to bed with stand-by assist. Patient alert and oriented X 3. The patient is not having any pain. 0-10 Pain Scale: 0. Admission vital signs: Blood pressure 136/70, pulse 83, temperature 97  F (36.1  C), temperature source Tympanic, resp. rate 12, height 1.651 m (5' 5\"), weight 73.9 kg (163 lb), SpO2 96 %. Patient was oriented to plan of care, call light, bed controls, tv, telephone, bathroom and visiting hours.     Risk Assessment    The following safety risks were identified during admission: fall. Yellow risk band applied: YES.     Skin Initial Assessment    This writer admitted this patient and completed a full skin assessment and Vinayak score in the Adult PCS flowsheet. Appropriate interventions initiated as needed.            Education    Patient has a [facility] to Observation order: No  Observation education completed and documented: N/A      Deborah Bey RN    "

## 2020-08-16 NOTE — H&P
[facility]    History and Physical  Hospitalist and Discharge Summary       Date of Admission:  8/15/2020    Assessment & Plan   Vikas Ji is a 80 year old male who presents with hypoglycemia.        Hypoglycemia: Chronically well controlled diabetic with hemoglobin A1c of 5.8 11 months ago and this has remained stable at 5.8 during this time.  Overnight with D5 infusion blood sugars remained stable, d5 containing fluds were discontinue and he was monitored throughout the morning and he was insistant on discharging.  He tolerated a regular consistency diet and given the stability of blood sugars discharged was felt reasonable.     Very nice discussion with both patient and his daughter regarding further diabetes management especially in the setting of new diagnosis of adenocarcinoma of the esophagus.  Going forward we will discontinue his Lantus given risk outweighs benefit, continue with metformin and sulfonylurea with possible de-escalation of these given his further cancer care course.    He will follow-up in clinic as scheduled, continue to monitor his blood sugars 3 times a day.     Adenocarcinoma of esophagus (H): He has deferred chemotherapy and will start radiation oncology treatments on 8/20/2020    Code Status: No CPR- Do NOT Intubate    Noble Shen    Primary Care Physician   Ace Salter    Chief Complaint    Syncope    History is obtained from the patient and chart review.    History of Present Illness   Vikas Ji is a 80 year old male who presents with syncope likely due to hypoglycemia.  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    On EMS arrival patient's blood sugar was noted to be 19, he received D50 with improvement, underwent trauma evaluation in the ER, was placed on D5 overnight and  observed.    Past Medical History    I have reviewed this patient's medical history and updated it with pertinent information if needed.   Past Medical History:   Diagnosis Date     Dementia (H)      HLD (hyperlipidemia)      HTN (hypertension)      Hypomagnesemia     9/10/2015     Other abnormalities of gait and mobility     10/6/2015     Type 2 diabetes mellitus with hyperglycemia (H)     9/10/2015       Past Surgical History   I have reviewed this patient's surgical history and updated it with pertinent information if needed.  Past Surgical History:   Procedure Laterality Date     COLONOSCOPY  09/27/2010 2010,F/U 2020  Ulcerative colitis     COLONOSCOPY N/A 6/30/2020    Procedure: COLONOSCOPY, WITH POLYPECTOMY AND BIOPSY;  Surgeon: Eliseo Arshad MD;  Location:  OR     ESOPHAGOSCOPY, GASTROSCOPY, DUODENOSCOPY (EGD), COMBINED N/A 6/30/2020    Procedure: ESOPHAGOGASTRODUODENOSCOPY, WITH BIOPSY;  Surgeon: Eliseo Arshad MD;  Location:  OR       Prior to Admission Medications   Prior to Admission Medications   Prescriptions Last Dose Informant Patient Reported? Taking?   Multiple Vitamin (MULTI-VITAMINS) TABS   Yes No   Sig: Take 2 tablets by mouth daily   aspirin 81 MG chewable tablet   Yes No   Sig: Take 81 mg by mouth once   calcium & magnesium carbonates 311-232 MG TABS   Yes No   Sig: Take 3 tablets by mouth daily   glipiZIDE (GLUCOTROL) 10 MG tablet   No No   Sig: Take 1 tablet (10 mg) by mouth 2 times daily (before meals) In AM and PM -- and add 1/2 tablet daily with Lunch.   insulin glargine (LANTUS VIAL) 100 UNIT/ML vial   No No   Sig: Inject 45 Units Subcutaneous every morning (before breakfast)   Patient taking differently: Inject 35 Units Subcutaneous every morning (before breakfast)    losartan (COZAAR) 100 MG tablet   No No   Sig: Take 1 tablet (100 mg) by mouth daily   metFORMIN (GLUCOPHAGE) 1000 MG tablet   Yes No   Sig: Take 1 tablet by mouth 2 times daily (with meals)    omeprazole (PRILOSEC) 20 MG DR capsule   No No   Sig: Take 1 capsule (20 mg) by mouth daily   simvastatin (ZOCOR) 40 MG tablet   No No   Sig: Take 1 tablet (40 mg) by mouth At Bedtime      Facility-Administered Medications: None     Allergies   No Known Allergies    Social History   I have reviewed this patient's social history and updated it with pertinent information if needed. Vikas Ji  reports that he quit smoking about 33 years ago. His smoking use included cigarettes. He has a 18.00 pack-year smoking history. His smokeless tobacco use includes chew. He reports current alcohol use of about 10.0 standard drinks of alcohol per week. He reports that he does not use drugs.    Family History   I have reviewed this patient's family history and updated it with pertinent information if needed.   Family History   Problem Relation Age of Onset     No Known Problems Mother      No Known Problems Father      Throat cancer Maternal Grandmother        Review of Systems     REVIEW OF SYSTEMS:    Constitutional: normal energy and appetite, no recent sick contacts, no flulike symptoms.  Eyes: no changes in vision no headaches today.  Ears, nose, mouth, throat, and face: no mouth sores, dysphagia, or odynophagia  Respiratory: no shortness of breath, cough, or wheezing. No aspiration symptoms.   Cardiovascular: no chest pain, palpitations, orthopnea, increased lower extremity edema.  Gastrointestinal: Intermittent constipation, no diarrhea, nausea, vomiting or abdominal pain.  Genitourinary: no dysuria, hematuria, urgency or frequency.   Hematologic/lymphatic: no unintentional weight loss or night sweats.  Musculoskeletal: no pain to extremities.  Neurological: no new weakness, tingling, numbness.   Endocrine: blood sugars have been well controlled.     Physical Exam   Temp: 97.9  F (36.6  C) Temp src: Tympanic BP: (!) 156/68 Pulse: 83 Heart Rate: 86 Resp: 16 SpO2: 94 % O2 Device: None (Room air)    Vital Signs with  Ranges  Temp:  [96.7  F (35.9  C)-97.9  F (36.6  C)] 97.9  F (36.6  C)  Pulse:  [81-88] 83  Heart Rate:  [75-88] 86  Resp:  [11-38] 16  BP: (117-161)/(65-86) 156/68  SpO2:  [94 %-99 %] 94 %  165 lbs 5.52 oz    Exam:  GENERAL: Talkative, sitting up in the chair after eating breakfast, in no apparent distress.  Head: normocephalic and atraumatic  Eyes: anicteric and non-injected sclera  Nose: no rhinorrhea or epistaxis.   Throat: moist mucous membranes with no active oral lesions.  NECK: Supple, jugular venous distension not present.  CARDIOVASCULAR: regular rate and rhythm, no murmurs, rubs, or gallops. Normal S1/S2. No lower extremity edema.   RESPIRATORY: clear to auscultation bilaterally, no wheezes, no crackles.  No accessory muscle use or evidence of respiratory distress.   GI: soft, non-tender, non-distended, normoactive bowel sounds.  MUSCULOSKELETAL: warm and well perfused, 2+ dorsalis pedis pulses.    SKIN: no pallor, jaundice or rashes.  NEUROLOGY: AAOx3, follows commands, speech and language without focal deficits.  Hard of hearing.       Data   Data reviewed today:  I personally reviewed the EKG tracing showing no EKG available for review..  Recent Labs   Lab 08/16/20  0825 08/16/20  0019   WBC 6.4 6.4   HGB 13.3 13.2*   MCV 93 93    195   * 131*   POTASSIUM 4.3 3.9   CHLORIDE 102 97*   CO2 25 24   BUN 15 17   CR 0.75 0.94   ANIONGAP 5 10   JOSELITO 9.2 9.6   * 103   ALBUMIN  --  4.3   PROTTOTAL  --  6.5   BILITOTAL  --  0.4   ALKPHOS  --  36   ALT  --  10   AST  --  16       Recent Results (from the past 24 hour(s))   CT Head w/o Contrast    Narrative    PROCEDURE INFORMATION:   Exam: CT Head Without Contrast   Exam date and time: 8/16/2020 12:22 AM   Age: 80 years old   Clinical indication: Injury or trauma; Fall; Initial encounter; Blunt trauma   (contusions or hematomas); Consciousness not specified; Patient HX: Pmh of dm2,   HTN, esophageal adenocarcinoma, uc presenting to the ED with  cc of collapse   witnessed by his wife at home. Patient reportedly fell from standing striking   his head on the ground. On EMS arrival, the patient's blood sugar was noted to   be 19. ; Additional info: See the clinical information for interpreting   provider     TECHNIQUE:   Imaging protocol: Computed tomography of the head without contrast.   Radiation optimization: All CT scans at this facility use at least one of these   dose optimization techniques: automated exposure control; mA and/or kV   adjustment per patient size (includes targeted exams where dose is matched to   clinical indication); or iterative reconstruction.     COMPARISON:   CT HEAD W/O CONTRAST 8/19/2018 1:36 PM     FINDINGS:   Brain:  Atrophy. No acute hemorrhage. No acute infarct. Patchy white matter   disease.  Ventricles: Unremarkable.   Bones/joints: No calvarial fracture.   Sinuses: Visualized sinuses are unremarkable.   Mastoid air cells: Visualized mastoid air cells are well aerated.    Soft tissues: Unremarkable.       Impression    IMPRESSION:   No evidence of acute intracranial hemorrhage.     THIS DOCUMENT HAS BEEN ELECTRONICALLY SIGNED BY COURT LONDON MD   CT Cervical Spine w/o Contrast    Narrative    PROCEDURE INFORMATION:   Exam: CT Cervical Spine Without Contrast   Exam date and time: 8/16/2020 12:22 AM   Age: 80 years old   Clinical indication: Injury or trauma; Fall; Initial encounter; Blunt trauma;   Patient HX: Pmh of dm2, HTN, esophageal adenocarcinoma, uc presenting to the ED   with cc of collapse witnessed by his wife at home. Patient reportedly fell from   standing striking his head on the ground. On EMS arrival, the patient's blood   sugar was noted to be 19. ; Additional info: See the clinical information for   interpreting provider     TECHNIQUE:   Imaging protocol: Computed tomography images of the cervical spine without   contrast.   Radiation optimization: All CT scans at this facility use at least one of these    dose optimization techniques: automated exposure control; mA and/or kV   adjustment per patient size (includes targeted exams where dose is matched to   clinical indication); or iterative reconstruction.     COMPARISON:   No relevant prior studies available.     FINDINGS:   Vertebrae: No acute fracture. No malalignment.   Discs/Spinal canal/Neural foramina: Moderate degenerative disease.     Soft tissues: No acute pathology. Small disc osteophyte complexes.  Lungs: Bullous changes. Patchy left apical opacity.  Other: Mildly distended esophagus.      Impression    IMPRESSION:   No evidence of acute fracture or malalignment.   Non-emergent findings, as above.      THIS DOCUMENT HAS BEEN ELECTRONICALLY SIGNED BY COURT LONDON MD   XR Chest Port 1 View    Narrative    Procedure:XR CHEST PORT 1 VW    Clinical history:Male, 80 years, left lung pna?    Technique: Single view was obtained.    Comparison: 12/18/2019    Findings: The cardiac silhouette is normal. The pulmonary vasculature  is normal.    The lungs are hyperinflated however appear to be clear. Bony  structures are unremarkable.      Impression    Impression:   Persistent hyperinflation of the lungs without evidence of pneumonia,  CHF or other acute abnormality.    JOSEPH BOSWELL MD

## 2020-08-16 NOTE — PROGRESS NOTES
Forgetful at times, asking repetitive questions. Set off bed alarm. Blood sugars stable. Ate a meal when admitted to med surg, no issue swallowing observed.     Deborah Bey RN on 8/16/2020 at 4:31 AM

## 2020-08-16 NOTE — PROVIDER NOTIFICATION
08/16/20 0200 08/16/20 0308 08/16/20 0409   Point of Care Testing   Bedside Glucose (mg/dl )  125 mg/dl 130 mg/dl 217 mg/dl      08/16/20 0505 08/16/20 0606   Point of Care Testing   Bedside Glucose (mg/dl )  216 mg/dl 209 mg/dl     Deborah Bey RN on 8/16/2020 at 6:15 AM

## 2020-08-16 NOTE — ED NOTES
MD verbalized okay for Pt to have a cup of OJ prior to going to CT D/T  mg/dl . Pt drank OJ  237 ml. Pt son arrived to BS with Pt.

## 2020-08-16 NOTE — PROGRESS NOTES
NSG DISCHARGE NOTE    Patient discharged to home at 11:50 am via wheel chair. Accompanied by daughter and staff. Discharge instructions reviewed with patient and daughter, opportunity offered to ask questions. Prescriptions - None ordered for discharge. All belongings sent with patient.    Elizabeth Campbell RN

## 2020-08-16 NOTE — PHARMACY-ADMISSION MEDICATION HISTORY
Pharmacy -- Admission Medication Reconciliation    Prior to admission (PTA) medications were reviewed and the patient's PTA medication list was updated.    Sources Consulted: [employer], chart notes, Care Everywhere  Faxed [facility] and [facility] (no response at time of note)    The reliability of this Medication Reconciliation is: Reliability: Unreliable    The following significant changes were made:  None  Patient discharged prior to obtaining any information     Medication barriers identified: not assessed   Medication adherence concerns: not assessed   Understanding of emergency medications: n/a, no glucose tabs on our list    Jesica Brizuela [facility], 8/16/2020,  1:14 PM

## 2020-08-16 NOTE — PHARMACY - DISCHARGE MEDICATION RECONCILIATION AND EDUCATION
Pharmacy:  Discharge Counseling and Medication Reconciliation    Vikas Ji  [address]  [facility] [id number] [phone] (home)   80 year old male  PCP: Ace Salter    Allergies: Patient has no known allergies.    Discharge Counseling:    No new medications  No changes to medications  Pharmacist did NOT meet with patient to review the medication portion of the After Visit Summary.  Summary of Education: none    Materials Provided:  MedCounselor sheets printed from Clinical Pharmacology on: none    Discharge Medication Reconciliation:    It has been determined that the patient has an adequate supply of medications available or which can be obtained from the [facility]    Patient might benefit from glucose tablets and/or reduction in doses of diabetes medications.    Thank you for the consult.    Jesica Brizuela [facility]........August 16, 2020 1:17 PM

## 2020-08-16 NOTE — ED NOTES
Nurse to nurse report given. HARSH Cabrera denies questions and or  need for further information at this time.

## 2020-08-17 ENCOUNTER — PATIENT OUTREACH (OUTPATIENT)
Dept: CARE COORDINATION | Facility: CLINIC | Age: 80
End: 2020-08-17

## 2020-08-17 ENCOUNTER — TELEPHONE (OUTPATIENT)
Dept: INTERNAL MEDICINE | Facility: OTHER | Age: 80
End: 2020-08-17

## 2020-08-17 DIAGNOSIS — H61.23 BILATERAL IMPACTED CERUMEN: Primary | ICD-10-CM

## 2020-08-17 LAB — INTERPRETATION ECG - MUSE: NORMAL

## 2020-08-17 NOTE — TELEPHONE ENCOUNTER
Spoke with Nirmala ears are not coming clean. 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 This can be in [place] or here patient will start radiation on thursdays in [place]. Conchis Pugh LPN .......................8/17/2020  11:00 AM

## 2020-08-17 NOTE — PROGRESS NOTES
Clinic Care Coordination Contact    Writer placed call out to patient/spouse on this date to f/u after recent hospitalization to offer support upon returning home.     No answer, VM left requesting return call.     FLORENCIA Ferraro on 8/17/2020 at 9:03 AM    Clinic Care Coordination Contact    Writer reached out to spouse of patient on this date, says overall he is doing well continues to exhibit memory loss, conversed about progressing memory loss.  Offered support with options available in town, respite, home care services and potential facility placement.  Direct dial given if need any further discussion, also encouraged to reach out to the clinic or ER if any concerns arise.    Transitional Care Management Phone Call    Summary of hospitalization:  [facility] discharge summary reviewed    DISCHARGE DIAGNOSIS: Hypoglycemia    DATE OF DISCHARGE: 08/16/2020    Diagnostic Tests/Treatments reviewed.  Follow up needed: PCP    Post Discharge Medication Reconciliation: discharge medications reconciled, continue medications without change.    Medications reviewed by: by myself    Problems taking medications regularly:  None    Problems adhering to non-medication therapy:  None    Other Healthcare Providers Involved in Patient s Care:         None     Update since discharge: improved.     Plan of care communicated with patient and family    Just a friendly reminder that you appointment is   Next 5 appointments (look out 90 days)    Sep 11, 2020  1:20 PM CDT  PHYSICAL with Ace Salter MD  [facility] ([facility]) [address]  [place] [address]  [phone]   Sep 24, 2020  9:00 AM CDT  Return Visit with Chritsine Rodriguez MD  [facility] ([facility]) [address]  [place] [address]  [phone]      .  We encourage you to keep this appointment.  Please remember to bring all of  your pills in their bottles (including any vitamins or over the counter pills) with you to your appointment.     The patient indicates understanding of these issues and agrees with the plan of care.   Yes    Was the patient contacted within the 2 business days or other approved timeframe?  Yes    Was the Medication reconciliation and management done since the patient was discharged? Yes    FLORENCIA Ferraro  8/17/2020 1:36 PM

## 2020-08-19 ENCOUNTER — OFFICE VISIT (OUTPATIENT)
Dept: OTOLARYNGOLOGY | Facility: OTHER | Age: 80
End: 2020-08-19
Attending: FAMILY MEDICINE
Payer: COMMERCIAL

## 2020-08-19 ENCOUNTER — TELEPHONE (OUTPATIENT)
Dept: INTERNAL MEDICINE | Facility: OTHER | Age: 80
End: 2020-08-19

## 2020-08-19 VITALS
DIASTOLIC BLOOD PRESSURE: 70 MMHG | SYSTOLIC BLOOD PRESSURE: 120 MMHG | WEIGHT: 165 LBS | OXYGEN SATURATION: 97 % | BODY MASS INDEX: 27.46 KG/M2 | HEART RATE: 89 BPM | TEMPERATURE: 98.1 F

## 2020-08-19 DIAGNOSIS — Z79.4 CONTROLLED TYPE 2 DIABETES MELLITUS WITHOUT COMPLICATION, WITH LONG-TERM CURRENT USE OF INSULIN (H): ICD-10-CM

## 2020-08-19 DIAGNOSIS — E11.9 CONTROLLED TYPE 2 DIABETES MELLITUS WITHOUT COMPLICATION, WITH LONG-TERM CURRENT USE OF INSULIN (H): ICD-10-CM

## 2020-08-19 DIAGNOSIS — H61.23 BILATERAL IMPACTED CERUMEN: ICD-10-CM

## 2020-08-19 PROCEDURE — 69210 REMOVE IMPACTED EAR WAX UNI: CPT | Mod: 50 | Performed by: NURSE PRACTITIONER

## 2020-08-19 PROCEDURE — G0463 HOSPITAL OUTPT CLINIC VISIT: HCPCS

## 2020-08-19 RX ORDER — GLIPIZIDE 10 MG/1
10 TABLET ORAL
Qty: 180 TABLET | Refills: 3 | COMMUNITY
Start: 2020-08-19 | End: 2021-02-04

## 2020-08-19 ASSESSMENT — PAIN SCALES - GENERAL: PAINLEVEL: NO PAIN (0)

## 2020-08-19 NOTE — TELEPHONE ENCOUNTER
Daughter would like to discuss concerns with Ace Salter MD in person today.      Yesenia Vargas LPN 8/19/2020 9:38 AM

## 2020-08-19 NOTE — NURSING NOTE
"Chief Complaint   Patient presents with     Cerumen Impaction     referred by Dr. LORRIE Baumann for ear cleaning       Initial /70   Pulse 89   Temp 98.1  F (36.7  C) (Tympanic)   Wt 74.8 kg (165 lb)   SpO2 97%   BMI 27.46 kg/m   Estimated body mass index is 27.46 kg/m  as calculated from the following:    Height as of 8/16/20: 1.651 m (5' 5\").    Weight as of this encounter: 74.8 kg (165 lb).  Medication Reconciliation: complete  Lori Turcios LPN  "

## 2020-08-19 NOTE — LETTER
8/19/2020         RE: Vikas Ji  [address]  [facility] [address]        Dear Colleague,    Thank you for referring your patient, Vikas Ji, to the [facility]. Please see a copy of my visit note below.    Otolaryngology Note         Chief Complaint:     Patient presents with:  Cerumen Impaction: referred by Dr. LORRIE Baumann for ear cleaning           History of Present Illness:     Vikas Ji is a 80 year old male who presents today for ear cleaning.    He is trying to get hearing aids refitted, he was unable to get them fitted due to wax    He has never had his ears cleaned in clinic.  His daughter attempted to clean them at home with OTC wax kit but was unsuccessful. He has been using wax softening drops   + concerns with hearing, he has a history of hearing loss but has been worsened with cerumen impaction  No tinnitus, vertigo, facial numbness or weakness.      No otalgia, otorrhea.    No hx of COM or otologic surgeries.          Medications:     Current Outpatient Rx   Medication Sig Dispense Refill     aspirin 81 MG chewable tablet Take 81 mg by mouth once       calcium & magnesium carbonates 311-232 MG TABS Take 3 tablets by mouth daily       glipiZIDE (GLUCOTROL) 10 MG tablet Take 1 tablet (10 mg) by mouth 3 times daily (before meals) -- Dose Increase 8/19/2020 180 tablet 3     losartan (COZAAR) 100 MG tablet Take 1 tablet (100 mg) by mouth daily 90 tablet 3     metFORMIN (GLUCOPHAGE) 1000 MG tablet Take 1 tablet by mouth 2 times daily (with meals)       Multiple Vitamin (MULTI-VITAMINS) TABS Take 2 tablets by mouth daily       omeprazole (PRILOSEC) 20 MG DR capsule Take 1 capsule (20 mg) by mouth daily 90 capsule 3     simvastatin (ZOCOR) 40 MG tablet Take 1 tablet (40 mg) by mouth At Bedtime 90 tablet 3            Allergies:     Allergies: Patient has no known allergies.          Past Medical History:     Past Medical History:   Diagnosis Date     Dementia  (H)      HLD (hyperlipidemia)      HTN (hypertension)      Hypomagnesemia     9/10/2015     Other abnormalities of gait and mobility     10/6/2015     Type 2 diabetes mellitus with hyperglycemia (H)     9/10/2015            Past Surgical History:     Past Surgical History:   Procedure Laterality Date     COLONOSCOPY  2010,F/U   Ulcerative colitis     COLONOSCOPY N/A 2020    Procedure: COLONOSCOPY, WITH POLYPECTOMY AND BIOPSY;  Surgeon: Eliseo Arshad MD;  Location:  OR     ESOPHAGOSCOPY, GASTROSCOPY, DUODENOSCOPY (EGD), COMBINED N/A 2020    Procedure: ESOPHAGOGASTRODUODENOSCOPY, WITH BIOPSY;  Surgeon: Eliseo Arshad MD;  Location:  OR       ENT family history reviewed         Social History:     Social History     Tobacco Use     Smoking status: Former Smoker     Packs/day: 0.60     Years: 30.00     Pack years: 18.00     Types: Cigarettes     Last attempt to quit: 1987     Years since quittin.6     Smokeless tobacco: Current User     Types: Chew   Substance Use Topics     Alcohol use: Yes     Alcohol/week: 10.0 standard drinks     Types: 10 Cans of beer per week     Frequency: 4 or more times a week     Drinks per session: 1 or 2     Drug use: No            Review of Systems:     ROS: See HPI         Physical Exam:     /70   Pulse 89   Temp 98.1  F (36.7  C) (Tympanic)   Wt 74.8 kg (165 lb)   SpO2 97%   BMI 27.46 kg/m      General - The patient is well nourished and well developed, and appears to have good nutritional status.  Alert and oriented to person and place, answers questions and cooperates with examination appropriately.   Head and Face - Normocephalic and atraumatic, with no gross asymmetry noted.  The facial nerve is intact, with strong symmetric movements.  Voice and Breathing - The patient was breathing comfortably without the use of accessory muscles. There was no wheezing, stridor. The patients voice was clear and strong, and had  appropriate pitch and quality.  Ears - The ears were examined with binocular microscopy and with otoscope.  External ears normal. Right canal cerumen impacted.  Left canal cerumen impacted.  The right ear was cleaned with #7, #5, #3 sucker with multiple irrigations of peroxide and water to loosen thick sticky wax.   Right tympanic membrane is intact without effusion, retraction or mass. The left ear was cleaned with #7, #5, #3 sucker with multiple irrigations of peroxide and water to loosen wax.  Left tympanic membrane is intact without effusion, retraction or mass.  Eyes - Extraocular movements intact, sclera were not icteric or injected, conjunctiva were pink and moist.  Mouth - Examination of the oral cavity showed pink, healthy oral mucosa. Dentition in good condition. No lesions or ulcerations noted. The tongue was mobile and midline.   Throat - The walls of the oropharynx were smooth, pink, moist, symmetric, and had no lesions or ulcerations.  The tonsillar pillars and soft palate were symmetric. The uvula was midline on elevation.    Neck - Full range of motion on passive movement.  Palpation of the occipital, submental, submandibular, internal jugular chain, and supraclavicular nodes did not demonstrate any abnormal lymph nodes or masses.  Palpation of the thyroid was soft and smooth, with no nodules or goiter appreciated.  The trachea was mobile and midline.  Nose - External contour is symmetric, no gross deflection or scars.  Nasal mucosa is pink and moist with no abnormal mucus.  The septum and turbinates were evaluated with nasal speculum, no polyps, masses, or purulence noted on examination.         Assessment and Plan:     Ears were cleaned, tolerated well  The ears are cleaned and both look good  Follow up in 12 months for repeat ear cleaning  You can try debrox drops to keep the wax softened - follow package directions  Keep scheduled follow up with Audiology      Ariane Morales  [facility]      Again, thank you for allowing me to participate in the care of your patient.        Sincerely,        Ariane Tinsley NP

## 2020-08-19 NOTE — PROGRESS NOTES
Otolaryngology Note         Chief Complaint:     Patient presents with:  Cerumen Impaction: referred by Dr. LORRIE Baumann for ear cleaning           History of Present Illness:     Viksa Ji is a 80 year old male who presents today for ear cleaning.    He is trying to get hearing aids refitted, he was unable to get them fitted due to wax    He has never had his ears cleaned in clinic.  His daughter attempted to clean them at home with OTC wax kit but was unsuccessful. He has been using wax softening drops   + concerns with hearing, he has a history of hearing loss but has been worsened with cerumen impaction  No tinnitus, vertigo, facial numbness or weakness.      No otalgia, otorrhea.    No hx of COM or otologic surgeries.          Medications:     Current Outpatient Rx   Medication Sig Dispense Refill     aspirin 81 MG chewable tablet Take 81 mg by mouth once       calcium & magnesium carbonates 311-232 MG TABS Take 3 tablets by mouth daily       glipiZIDE (GLUCOTROL) 10 MG tablet Take 1 tablet (10 mg) by mouth 3 times daily (before meals) -- Dose Increase 8/19/2020 180 tablet 3     losartan (COZAAR) 100 MG tablet Take 1 tablet (100 mg) by mouth daily 90 tablet 3     metFORMIN (GLUCOPHAGE) 1000 MG tablet Take 1 tablet by mouth 2 times daily (with meals)       Multiple Vitamin (MULTI-VITAMINS) TABS Take 2 tablets by mouth daily       omeprazole (PRILOSEC) 20 MG DR capsule Take 1 capsule (20 mg) by mouth daily 90 capsule 3     simvastatin (ZOCOR) 40 MG tablet Take 1 tablet (40 mg) by mouth At Bedtime 90 tablet 3            Allergies:     Allergies: Patient has no known allergies.          Past Medical History:     Past Medical History:   Diagnosis Date     Dementia (H)      HLD (hyperlipidemia)      HTN (hypertension)      Hypomagnesemia     9/10/2015     Other abnormalities of gait and mobility     10/6/2015     Type 2 diabetes mellitus with hyperglycemia (H)     9/10/2015            Past Surgical History:      Past Surgical History:   Procedure Laterality Date     COLONOSCOPY  2010,F/U   Ulcerative colitis     COLONOSCOPY N/A 2020    Procedure: COLONOSCOPY, WITH POLYPECTOMY AND BIOPSY;  Surgeon: Eliseo Arshad MD;  Location:  OR     ESOPHAGOSCOPY, GASTROSCOPY, DUODENOSCOPY (EGD), COMBINED N/A 2020    Procedure: ESOPHAGOGASTRODUODENOSCOPY, WITH BIOPSY;  Surgeon: Eliseo Arshad MD;  Location:  OR       ENT family history reviewed         Social History:     Social History     Tobacco Use     Smoking status: Former Smoker     Packs/day: 0.60     Years: 30.00     Pack years: 18.00     Types: Cigarettes     Last attempt to quit: 1987     Years since quittin.6     Smokeless tobacco: Current User     Types: Chew   Substance Use Topics     Alcohol use: Yes     Alcohol/week: 10.0 standard drinks     Types: 10 Cans of beer per week     Frequency: 4 or more times a week     Drinks per session: 1 or 2     Drug use: No            Review of Systems:     ROS: See HPI         Physical Exam:     /70   Pulse 89   Temp 98.1  F (36.7  C) (Tympanic)   Wt 74.8 kg (165 lb)   SpO2 97%   BMI 27.46 kg/m      General - The patient is well nourished and well developed, and appears to have good nutritional status.  Alert and oriented to person and place, answers questions and cooperates with examination appropriately.   Head and Face - Normocephalic and atraumatic, with no gross asymmetry noted.  The facial nerve is intact, with strong symmetric movements.  Voice and Breathing - The patient was breathing comfortably without the use of accessory muscles. There was no wheezing, stridor. The patients voice was clear and strong, and had appropriate pitch and quality.  Ears - The ears were examined with binocular microscopy and with otoscope.  External ears normal. Right canal cerumen impacted.  Left canal cerumen impacted.  The right ear was cleaned with #7, #5, #3 sucker with  multiple irrigations of peroxide and water to loosen thick sticky wax.   Right tympanic membrane is intact without effusion, retraction or mass. The left ear was cleaned with #7, #5, #3 sucker with multiple irrigations of peroxide and water to loosen wax.  Left tympanic membrane is intact without effusion, retraction or mass.  Eyes - Extraocular movements intact, sclera were not icteric or injected, conjunctiva were pink and moist.  Mouth - Examination of the oral cavity showed pink, healthy oral mucosa. Dentition in good condition. No lesions or ulcerations noted. The tongue was mobile and midline.   Throat - The walls of the oropharynx were smooth, pink, moist, symmetric, and had no lesions or ulcerations.  The tonsillar pillars and soft palate were symmetric. The uvula was midline on elevation.    Neck - Full range of motion on passive movement.  Palpation of the occipital, submental, submandibular, internal jugular chain, and supraclavicular nodes did not demonstrate any abnormal lymph nodes or masses.  Palpation of the thyroid was soft and smooth, with no nodules or goiter appreciated.  The trachea was mobile and midline.  Nose - External contour is symmetric, no gross deflection or scars.  Nasal mucosa is pink and moist with no abnormal mucus.  The septum and turbinates were evaluated with nasal speculum, no polyps, masses, or purulence noted on examination.         Assessment and Plan:     Ears were cleaned, tolerated well  The ears are cleaned and both look good  Follow up in 12 months for repeat ear cleaning  You can try debrox drops to keep the wax softened - follow package directions  Keep scheduled follow up with Audiology      Ariane GARZA  [facility]

## 2020-08-19 NOTE — PATIENT INSTRUCTIONS
The ears are cleaned and both look good  Follow up in 12 months for repeat ear cleaning  You can try debrox drops to keep the wax softened - follow package directions    Thank you for allowing Ariane GARZA and our ENT team to participate in your care.  If your medications are too expensive, please call my nurse at the number listed below.  We can possibly change this medication.    If you have a scheduling or an appointment question please contact our Health Unit Coordinator at their direct line [phone].   ALL nursing questions or concerns can be directed to your ENT nurses at: [phone] or [phone].

## 2020-08-19 NOTE — TELEPHONE ENCOUNTER
Glucose running high. Increased to glipizide 10 mg 3 times daily with meals. Has follow-up on 9/11.  Ace Salter MD

## 2020-08-20 ENCOUNTER — RESULTS ONLY (OUTPATIENT)
Dept: RADIATION ONCOLOGY | Facility: HOSPITAL | Age: 80
End: 2020-08-20

## 2020-08-20 ENCOUNTER — APPOINTMENT (OUTPATIENT)
Dept: RADIATION ONCOLOGY | Facility: HOSPITAL | Age: 80
End: 2020-08-20
Attending: RADIOLOGY
Payer: COMMERCIAL

## 2020-08-20 LAB
RAD ONC ARIA COURSE ID: NORMAL
RAD ONC ARIA COURSE LAST TREATMENT DATE: NORMAL
RAD ONC ARIA COURSE START DATE: NORMAL
RAD ONC ARIA COURSE TREATMENT ELAPSED DAYS: 0
RAD ONC ARIA FIRST TREATMENT DATE: NORMAL
RAD ONC ARIA PLAN FRACTIONS TREATED TO DATE: 1
RAD ONC ARIA PLAN ID: NORMAL
RAD ONC ARIA PLAN NAME: NORMAL
RAD ONC ARIA PLAN PRESCRIBED DOSE PER FRACTION: 3 GY
RAD ONC ARIA PLAN TOTAL FRACTIONS PRESCRIBED: 12
RAD ONC ARIA PLAN TOTAL PRESCRIBED DOSE: 3600 CGY
RAD ONC ARIA REFERENCE POINT DOSAGE GIVEN TO DATE: NORMAL GY
RAD ONC ARIA REFERENCE POINT DOSAGE GIVEN TO DATE: NORMAL GY
RAD ONC ARIA REFERENCE POINT ID: NORMAL
RAD ONC ARIA REFERENCE POINT ID: NORMAL

## 2020-08-20 PROCEDURE — G6002 STEREOSCOPIC X-RAY GUIDANCE: HCPCS | Mod: 26 | Performed by: RADIOLOGY

## 2020-08-20 PROCEDURE — 77280 THER RAD SIMULAJ FIELD SMPL: CPT | Mod: 26 | Performed by: RADIOLOGY

## 2020-08-20 PROCEDURE — 77280 THER RAD SIMULAJ FIELD SMPL: CPT | Performed by: RADIOLOGY

## 2020-08-20 PROCEDURE — 77387 GUIDANCE FOR RADJ TX DLVR: CPT | Performed by: RADIOLOGY

## 2020-08-20 PROCEDURE — 77412 RADIATION TX DELIVERY LVL 3: CPT | Performed by: RADIOLOGY

## 2020-08-21 ENCOUNTER — APPOINTMENT (OUTPATIENT)
Dept: RADIATION ONCOLOGY | Facility: HOSPITAL | Age: 80
End: 2020-08-21
Payer: COMMERCIAL

## 2020-08-21 ENCOUNTER — RESULTS ONLY (OUTPATIENT)
Dept: RADIATION ONCOLOGY | Facility: HOSPITAL | Age: 80
End: 2020-08-21

## 2020-08-21 LAB
RAD ONC ARIA COURSE ID: NORMAL
RAD ONC ARIA COURSE LAST TREATMENT DATE: NORMAL
RAD ONC ARIA COURSE START DATE: NORMAL
RAD ONC ARIA COURSE TREATMENT ELAPSED DAYS: 1
RAD ONC ARIA FIRST TREATMENT DATE: NORMAL
RAD ONC ARIA PLAN FRACTIONS TREATED TO DATE: 2
RAD ONC ARIA PLAN ID: NORMAL
RAD ONC ARIA PLAN NAME: NORMAL
RAD ONC ARIA PLAN PRESCRIBED DOSE PER FRACTION: 3 GY
RAD ONC ARIA PLAN TOTAL FRACTIONS PRESCRIBED: 12
RAD ONC ARIA PLAN TOTAL PRESCRIBED DOSE: 3600 CGY
RAD ONC ARIA REFERENCE POINT DOSAGE GIVEN TO DATE: NORMAL GY
RAD ONC ARIA REFERENCE POINT DOSAGE GIVEN TO DATE: NORMAL GY
RAD ONC ARIA REFERENCE POINT ID: NORMAL
RAD ONC ARIA REFERENCE POINT ID: NORMAL

## 2020-08-21 PROCEDURE — G6002 STEREOSCOPIC X-RAY GUIDANCE: HCPCS | Mod: 26 | Performed by: RADIOLOGY

## 2020-08-21 PROCEDURE — 77387 GUIDANCE FOR RADJ TX DLVR: CPT | Performed by: RADIOLOGY

## 2020-08-21 PROCEDURE — 77412 RADIATION TX DELIVERY LVL 3: CPT | Performed by: RADIOLOGY

## 2020-08-24 ENCOUNTER — RESULTS ONLY (OUTPATIENT)
Dept: RADIATION ONCOLOGY | Facility: HOSPITAL | Age: 80
End: 2020-08-24

## 2020-08-24 ENCOUNTER — APPOINTMENT (OUTPATIENT)
Dept: RADIATION ONCOLOGY | Facility: HOSPITAL | Age: 80
End: 2020-08-24
Payer: COMMERCIAL

## 2020-08-24 LAB
RAD ONC ARIA COURSE ID: NORMAL
RAD ONC ARIA COURSE LAST TREATMENT DATE: NORMAL
RAD ONC ARIA COURSE START DATE: NORMAL
RAD ONC ARIA COURSE TREATMENT ELAPSED DAYS: 4
RAD ONC ARIA FIRST TREATMENT DATE: NORMAL
RAD ONC ARIA PLAN FRACTIONS TREATED TO DATE: 3
RAD ONC ARIA PLAN ID: NORMAL
RAD ONC ARIA PLAN NAME: NORMAL
RAD ONC ARIA PLAN PRESCRIBED DOSE PER FRACTION: 3 GY
RAD ONC ARIA PLAN TOTAL FRACTIONS PRESCRIBED: 12
RAD ONC ARIA PLAN TOTAL PRESCRIBED DOSE: 3600 CGY
RAD ONC ARIA REFERENCE POINT DOSAGE GIVEN TO DATE: NORMAL GY
RAD ONC ARIA REFERENCE POINT DOSAGE GIVEN TO DATE: NORMAL GY
RAD ONC ARIA REFERENCE POINT ID: NORMAL
RAD ONC ARIA REFERENCE POINT ID: NORMAL

## 2020-08-24 PROCEDURE — 77387 GUIDANCE FOR RADJ TX DLVR: CPT | Performed by: RADIOLOGY

## 2020-08-24 PROCEDURE — G6002 STEREOSCOPIC X-RAY GUIDANCE: HCPCS | Mod: 26 | Performed by: RADIOLOGY

## 2020-08-24 PROCEDURE — 77412 RADIATION TX DELIVERY LVL 3: CPT | Performed by: RADIOLOGY

## 2020-08-25 ENCOUNTER — RESULTS ONLY (OUTPATIENT)
Dept: RADIATION ONCOLOGY | Facility: HOSPITAL | Age: 80
End: 2020-08-25

## 2020-08-25 ENCOUNTER — APPOINTMENT (OUTPATIENT)
Dept: RADIATION ONCOLOGY | Facility: HOSPITAL | Age: 80
End: 2020-08-25
Payer: COMMERCIAL

## 2020-08-25 LAB
RAD ONC ARIA COURSE ID: NORMAL
RAD ONC ARIA COURSE LAST TREATMENT DATE: NORMAL
RAD ONC ARIA COURSE START DATE: NORMAL
RAD ONC ARIA COURSE TREATMENT ELAPSED DAYS: 5
RAD ONC ARIA FIRST TREATMENT DATE: NORMAL
RAD ONC ARIA PLAN FRACTIONS TREATED TO DATE: 4
RAD ONC ARIA PLAN ID: NORMAL
RAD ONC ARIA PLAN NAME: NORMAL
RAD ONC ARIA PLAN PRESCRIBED DOSE PER FRACTION: 3 GY
RAD ONC ARIA PLAN TOTAL FRACTIONS PRESCRIBED: 12
RAD ONC ARIA PLAN TOTAL PRESCRIBED DOSE: 3600 CGY
RAD ONC ARIA REFERENCE POINT DOSAGE GIVEN TO DATE: NORMAL GY
RAD ONC ARIA REFERENCE POINT DOSAGE GIVEN TO DATE: NORMAL GY
RAD ONC ARIA REFERENCE POINT ID: NORMAL
RAD ONC ARIA REFERENCE POINT ID: NORMAL

## 2020-08-25 PROCEDURE — 77387 GUIDANCE FOR RADJ TX DLVR: CPT | Performed by: RADIOLOGY

## 2020-08-25 PROCEDURE — G6002 STEREOSCOPIC X-RAY GUIDANCE: HCPCS | Mod: 26 | Performed by: RADIOLOGY

## 2020-08-25 PROCEDURE — 77412 RADIATION TX DELIVERY LVL 3: CPT | Performed by: RADIOLOGY

## 2020-08-26 ENCOUNTER — OFFICE VISIT (OUTPATIENT)
Dept: RADIATION ONCOLOGY | Facility: HOSPITAL | Age: 80
End: 2020-08-26
Attending: RADIOLOGY
Payer: COMMERCIAL

## 2020-08-26 ENCOUNTER — RESULTS ONLY (OUTPATIENT)
Dept: RADIATION ONCOLOGY | Facility: HOSPITAL | Age: 80
End: 2020-08-26

## 2020-08-26 VITALS — TEMPERATURE: 97.7 F | BODY MASS INDEX: 26.68 KG/M2 | WEIGHT: 160.3 LBS

## 2020-08-26 DIAGNOSIS — C15.5 PRIMARY ADENOCARCINOMA OF DISTAL THIRD OF ESOPHAGUS (H): Primary | ICD-10-CM

## 2020-08-26 LAB
RAD ONC ARIA COURSE ID: NORMAL
RAD ONC ARIA COURSE LAST TREATMENT DATE: NORMAL
RAD ONC ARIA COURSE START DATE: NORMAL
RAD ONC ARIA COURSE TREATMENT ELAPSED DAYS: 6
RAD ONC ARIA FIRST TREATMENT DATE: NORMAL
RAD ONC ARIA PLAN FRACTIONS TREATED TO DATE: 5
RAD ONC ARIA PLAN ID: NORMAL
RAD ONC ARIA PLAN NAME: NORMAL
RAD ONC ARIA PLAN PRESCRIBED DOSE PER FRACTION: 3 GY
RAD ONC ARIA PLAN TOTAL FRACTIONS PRESCRIBED: 12
RAD ONC ARIA PLAN TOTAL PRESCRIBED DOSE: 3600 CGY
RAD ONC ARIA REFERENCE POINT DOSAGE GIVEN TO DATE: NORMAL GY
RAD ONC ARIA REFERENCE POINT DOSAGE GIVEN TO DATE: NORMAL GY
RAD ONC ARIA REFERENCE POINT ID: NORMAL
RAD ONC ARIA REFERENCE POINT ID: NORMAL

## 2020-08-26 PROCEDURE — 77336 RADIATION PHYSICS CONSULT: CPT | Performed by: RADIOLOGY

## 2020-08-26 PROCEDURE — 77387 GUIDANCE FOR RADJ TX DLVR: CPT | Performed by: RADIOLOGY

## 2020-08-26 PROCEDURE — 77412 RADIATION TX DELIVERY LVL 3: CPT | Performed by: RADIOLOGY

## 2020-08-26 PROCEDURE — G6002 STEREOSCOPIC X-RAY GUIDANCE: HCPCS | Mod: 26 | Performed by: RADIOLOGY

## 2020-08-26 PROCEDURE — 77427 RADIATION TX MANAGEMENT X5: CPT | Performed by: RADIOLOGY

## 2020-08-26 ASSESSMENT — PAIN SCALES - GENERAL: PAINLEVEL: NO PAIN (0)

## 2020-08-26 NOTE — PROGRESS NOTES
"  [facility]  DEPARTMENT OF RADIATION ONCOLOGY   ON TREATMENT VISIT (OTV) NOTE    Name: Vikas Ji MRN: [id number]   : 1940 (80 year old)  Date of Service: 2020 Referring: Dr. 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 The patient is felt to be medically inoperable and medically not a candidate for chemotherapy. Among his pertinent comorbidities and circumstances are \"mild dementia and forgetfulness\" (children drive him), chronic mild hearing loss (hearing aids pending), \"ulcerative colitis\" (carries the diagnosis but not noted on recent colonoscopy), diverticular disease, type 2 diabetes mellitus that requires metformin as well as insulin, and significant diabetic peripheral neuropathy. His daughter is an oncology nurse, and his maternal uncle  of esophageal cancer in the  (difficult demise).    Subjective: Seen at linac, in clinic with family, and with nursing. Offers no new complaints. Still has painless dysphagia at the midsternal region. Still has occasional expulsion of solids. Denies aspiration or hematemesis. No pain. Denies nausea or vomiting. Denies known exposure to COVID-19, risky behaviors, or symptoms including febrile, chest, gustatory, gastrointestinal, and systemic complaints.    Objective:  Vitals: Temp 97.7  F (36.5  C) (Tympanic)   Wt 72.7 kg (160 lb 4.8 oz)   BMI 26.68 kg/m    Wt Readings from Last 3 Encounters:   20 72.7 " kg (160 lb 4.8 oz)   08/19/20 74.8 kg (165 lb)   08/16/20 75 kg (165 lb 5.5 oz)     KPS: 70.  Physical Exam:  -General: Appears well. Appears naturally thin and fit. Non-obese. No gross acute weight loss since starting radiation therapy. Clothes fit well. Appears slightly fatigued. Appears stated age. Appears in good general health, well developed, well nourished, and in no acute distress. Does not appear acutely or chronically ill. Appears well kempt and groomed.  -ENT: Normal lip color. No hoarseness.  -Lungs: No cough. Easy respirations. Regular. Unlabored. No use of accessory musculature.  -Chest: No erythema or hyperpigmentation.  -Abdomen: No erythema or hyperpigmentation. Non-distended.  -Neuro: Alert, oriented, nonfocal.  -Psych: Upbeat, chatty, pleasant, cooperative, insightful, concrete.    Impression: Routine tolerance to radiation therapy. Grade 0 toxicity attributable to radiation therapy. Weight loss seems to have occurred prior to the start of treatment.    Plan:  1) FEN: Reviewed diet and use of supplements such as boost. Seen by dietician.   2) Chemotherapy: No chemotherapy since a poor medical candidate.  3) Surgery: No surgery since a poor medical candidate.  4) Radiation therapy: No new interventions. No boost/cone down pending. Reviewed anticipated time course, nature, and potential interventions for managing toxicity during and after radiation therapy. Patient aware of the continued coverage of our clinic by multiple physicians. He wished to proceed with treatment. All questions answered to his satisfaction.     Weekly Review: Reviewed available labs and diagnostic studies. Discussed management with Therapy, Treatment Planning, and Nursing. Reviewed weekly routine QA, linac imaging, and clinical set up are adequate to proceed with radiation therapy as prescribed.    Medications:  Current Outpatient Medications   Medication Sig Dispense Refill     aspirin 81 MG chewable tablet Take 81 mg by  mouth once       calcium & magnesium carbonates 311-232 MG TABS Take 3 tablets by mouth daily       glipiZIDE (GLUCOTROL) 10 MG tablet Take 1 tablet (10 mg) by mouth 3 times daily (before meals) -- Dose Increase 8/19/2020 180 tablet 3     losartan (COZAAR) 100 MG tablet Take 1 tablet (100 mg) by mouth daily 90 tablet 3     metFORMIN (GLUCOPHAGE) 1000 MG tablet Take 1 tablet by mouth 2 times daily (with meals)       Multiple Vitamin (MULTI-VITAMINS) TABS Take 2 tablets by mouth daily       omeprazole (PRILOSEC) 20 MG DR capsule Take 1 capsule (20 mg) by mouth daily 90 capsule 3     simvastatin (ZOCOR) 40 MG tablet Take 1 tablet (40 mg) by mouth At Bedtime 90 tablet 3         Allergies:    No Known Allergies      Hero Ramirez MD, PhD

## 2020-08-27 ENCOUNTER — HOSPITAL ENCOUNTER (OUTPATIENT)
Dept: EDUCATION SERVICES | Facility: HOSPITAL | Age: 80
End: 2020-08-27
Attending: RADIOLOGY
Payer: COMMERCIAL

## 2020-08-27 ENCOUNTER — RESULTS ONLY (OUTPATIENT)
Dept: RADIATION ONCOLOGY | Facility: HOSPITAL | Age: 80
End: 2020-08-27

## 2020-08-27 LAB
RAD ONC ARIA COURSE ID: NORMAL
RAD ONC ARIA COURSE LAST TREATMENT DATE: NORMAL
RAD ONC ARIA COURSE START DATE: NORMAL
RAD ONC ARIA COURSE TREATMENT ELAPSED DAYS: 7
RAD ONC ARIA FIRST TREATMENT DATE: NORMAL
RAD ONC ARIA PLAN FRACTIONS TREATED TO DATE: 6
RAD ONC ARIA PLAN ID: NORMAL
RAD ONC ARIA PLAN NAME: NORMAL
RAD ONC ARIA PLAN PRESCRIBED DOSE PER FRACTION: 3 GY
RAD ONC ARIA PLAN TOTAL FRACTIONS PRESCRIBED: 12
RAD ONC ARIA PLAN TOTAL PRESCRIBED DOSE: 3600 CGY
RAD ONC ARIA REFERENCE POINT DOSAGE GIVEN TO DATE: NORMAL GY
RAD ONC ARIA REFERENCE POINT DOSAGE GIVEN TO DATE: NORMAL GY
RAD ONC ARIA REFERENCE POINT ID: NORMAL
RAD ONC ARIA REFERENCE POINT ID: NORMAL

## 2020-08-27 PROCEDURE — 77412 RADIATION TX DELIVERY LVL 3: CPT | Performed by: RADIOLOGY

## 2020-08-27 PROCEDURE — 77387 GUIDANCE FOR RADJ TX DLVR: CPT | Performed by: RADIOLOGY

## 2020-08-27 PROCEDURE — 40001203 ZZH STATISTIC NUTRITIONAL THERAPY NO CHARGE: Performed by: DIETITIAN, REGISTERED

## 2020-08-27 NOTE — PROGRESS NOTES
"[facility]  Medical Nutrition Therapy    Visit Type: Initial Assessment    Vikas Ji referred by Dr. Ramirez for MNT related to primary adenocarcinoma of distal third of esophagus.     Patient accompanied by his son.    Nutrition Assessment:  Anthropometrics  Height: 5' 5\"    BMI:  26.68      Weight: 160 lb 4.8 oz)    BSA: 1.83      IBW (kg):   Male:  61.5 kg (135 lb 9.3 oz)         Wt Readings from Last 10 Encounters:   08/26/20 72.7 kg (160 lb 4.8 oz)   08/19/20 74.8 kg (165 lb)   08/16/20 75 kg (165 lb 5.5 oz)   08/11/20 74.2 kg (163 lb 9.6 oz)   08/10/20 75.2 kg (165 lb 11.2 oz)   07/30/20 75.8 kg (167 lb)   06/30/20 63.5 kg (140 lb)   06/16/20 63.5 kg (140 lb)   05/07/20 76.2 kg (168 lb)   03/11/20 09/18/19 80.1 kg (176 lb 9.6 oz)  79.5 kg  (175 lb 4 oz)     9% (16lb) weight loss in 6 months (3/11/20 to 8/26/20)    Nutrition History  Met with pt and his son after radiation session. Pt has a hx of diverticulosis, B12 deficiency, type 2 diabetes (per family and been taken off insulin), hyperlipidemia, and hypertension. I am also informed there is some memory impairment. Pt reports no difficulty or pain swallowing, but his son believes there is some trouble swallowing. Pt at first says his appetite is good, but then states he becomes full quickly and will sometimes skip a meal. He has lost some interest in eating. Per pt usual weight was about 200lb, not sure of any timeframe of weight loss. There is some coughing and gagging in the morning due to phlegm.     Food Record   Breakfast - oatmeal with sugar and biscuit  Lunch- not much of an appetite, soup  Dinner- meat and potatoes with vegetable  Snacks- milkshake after dinner, encouraged to drink 2-3 Ensure a day     Physical Activity   not addressed    Nutrition Prescription  Energy:   2175-2550kcal (30-35 kcal/kg)      Protein:   85-110g (1.2-1.5g/kg)           Nutrition Diagnosis:   Moderate malnutrition related to inadequate oral intake as " evidenced by 9% weight loss in 6 months    Nutrition Intervention:   - Reviewed with following topics- making the most of each bite, small/frequent meals, tips for taste changes, swallowing difficulty and poor appetite. Encouraged pt to have a couple bites even if he is not hungry at lunch. Encourage frequent snacking.    Nutrition Goals:   adequate oral intake to meet nutrition need and maintain weight or promote healthy weight gain    Nutrition Follow Up / Monitoring:   intake, weight, labs    Nutrition Recommendations:   encourage small frequent meals of high calorie/protein foods.    Time spent with pt- 20 min    Patient to follow-up with RD per pt/family.  Patient has RD contact information to call/email if needed.

## 2020-08-28 ENCOUNTER — RESULTS ONLY (OUTPATIENT)
Dept: RADIATION ONCOLOGY | Facility: HOSPITAL | Age: 80
End: 2020-08-28

## 2020-08-28 ENCOUNTER — APPOINTMENT (OUTPATIENT)
Dept: RADIATION ONCOLOGY | Facility: HOSPITAL | Age: 80
End: 2020-08-28
Payer: COMMERCIAL

## 2020-08-28 LAB
RAD ONC ARIA COURSE ID: NORMAL
RAD ONC ARIA COURSE LAST TREATMENT DATE: NORMAL
RAD ONC ARIA COURSE START DATE: NORMAL
RAD ONC ARIA COURSE TREATMENT ELAPSED DAYS: 8
RAD ONC ARIA FIRST TREATMENT DATE: NORMAL
RAD ONC ARIA PLAN FRACTIONS TREATED TO DATE: 7
RAD ONC ARIA PLAN ID: NORMAL
RAD ONC ARIA PLAN NAME: NORMAL
RAD ONC ARIA PLAN PRESCRIBED DOSE PER FRACTION: 3 GY
RAD ONC ARIA PLAN TOTAL FRACTIONS PRESCRIBED: 12
RAD ONC ARIA PLAN TOTAL PRESCRIBED DOSE: 3600 CGY
RAD ONC ARIA REFERENCE POINT DOSAGE GIVEN TO DATE: NORMAL GY
RAD ONC ARIA REFERENCE POINT DOSAGE GIVEN TO DATE: NORMAL GY
RAD ONC ARIA REFERENCE POINT ID: NORMAL
RAD ONC ARIA REFERENCE POINT ID: NORMAL

## 2020-08-28 PROCEDURE — 77412 RADIATION TX DELIVERY LVL 3: CPT | Performed by: RADIOLOGY

## 2020-08-28 PROCEDURE — G6002 STEREOSCOPIC X-RAY GUIDANCE: HCPCS | Mod: 26 | Performed by: RADIOLOGY

## 2020-08-28 PROCEDURE — 77387 GUIDANCE FOR RADJ TX DLVR: CPT | Performed by: RADIOLOGY

## 2020-08-31 ENCOUNTER — APPOINTMENT (OUTPATIENT)
Dept: RADIATION ONCOLOGY | Facility: HOSPITAL | Age: 80
End: 2020-08-31
Payer: COMMERCIAL

## 2020-08-31 ENCOUNTER — RESULTS ONLY (OUTPATIENT)
Dept: RADIATION ONCOLOGY | Facility: HOSPITAL | Age: 80
End: 2020-08-31

## 2020-08-31 LAB
RAD ONC ARIA COURSE ID: NORMAL
RAD ONC ARIA COURSE LAST TREATMENT DATE: NORMAL
RAD ONC ARIA COURSE START DATE: NORMAL
RAD ONC ARIA COURSE TREATMENT ELAPSED DAYS: 11
RAD ONC ARIA FIRST TREATMENT DATE: NORMAL
RAD ONC ARIA PLAN FRACTIONS TREATED TO DATE: 8
RAD ONC ARIA PLAN ID: NORMAL
RAD ONC ARIA PLAN NAME: NORMAL
RAD ONC ARIA PLAN PRESCRIBED DOSE PER FRACTION: 3 GY
RAD ONC ARIA PLAN TOTAL FRACTIONS PRESCRIBED: 12
RAD ONC ARIA PLAN TOTAL PRESCRIBED DOSE: 3600 CGY
RAD ONC ARIA REFERENCE POINT DOSAGE GIVEN TO DATE: NORMAL GY
RAD ONC ARIA REFERENCE POINT DOSAGE GIVEN TO DATE: NORMAL GY
RAD ONC ARIA REFERENCE POINT ID: NORMAL
RAD ONC ARIA REFERENCE POINT ID: NORMAL

## 2020-08-31 PROCEDURE — G6002 STEREOSCOPIC X-RAY GUIDANCE: HCPCS | Mod: 26 | Performed by: RADIOLOGY

## 2020-08-31 PROCEDURE — 77387 GUIDANCE FOR RADJ TX DLVR: CPT | Performed by: RADIOLOGY

## 2020-08-31 PROCEDURE — 77412 RADIATION TX DELIVERY LVL 3: CPT | Performed by: RADIOLOGY

## 2020-09-01 ENCOUNTER — RESULTS ONLY (OUTPATIENT)
Dept: RADIATION ONCOLOGY | Facility: HOSPITAL | Age: 80
End: 2020-09-01

## 2020-09-01 ENCOUNTER — APPOINTMENT (OUTPATIENT)
Dept: RADIATION ONCOLOGY | Facility: HOSPITAL | Age: 80
End: 2020-09-01
Attending: RADIOLOGY
Payer: COMMERCIAL

## 2020-09-01 VITALS — WEIGHT: 160.1 LBS | TEMPERATURE: 96.3 F | BODY MASS INDEX: 26.64 KG/M2

## 2020-09-01 DIAGNOSIS — C15.5 PRIMARY ADENOCARCINOMA OF DISTAL THIRD OF ESOPHAGUS (H): Primary | ICD-10-CM

## 2020-09-01 LAB
RAD ONC ARIA COURSE ID: NORMAL
RAD ONC ARIA COURSE LAST TREATMENT DATE: NORMAL
RAD ONC ARIA COURSE START DATE: NORMAL
RAD ONC ARIA COURSE TREATMENT ELAPSED DAYS: 12
RAD ONC ARIA FIRST TREATMENT DATE: NORMAL
RAD ONC ARIA PLAN FRACTIONS TREATED TO DATE: 9
RAD ONC ARIA PLAN ID: NORMAL
RAD ONC ARIA PLAN NAME: NORMAL
RAD ONC ARIA PLAN PRESCRIBED DOSE PER FRACTION: 3 GY
RAD ONC ARIA PLAN TOTAL FRACTIONS PRESCRIBED: 12
RAD ONC ARIA PLAN TOTAL PRESCRIBED DOSE: 3600 CGY
RAD ONC ARIA REFERENCE POINT DOSAGE GIVEN TO DATE: NORMAL GY
RAD ONC ARIA REFERENCE POINT DOSAGE GIVEN TO DATE: NORMAL GY
RAD ONC ARIA REFERENCE POINT ID: NORMAL
RAD ONC ARIA REFERENCE POINT ID: NORMAL

## 2020-09-01 PROCEDURE — 77387 GUIDANCE FOR RADJ TX DLVR: CPT | Performed by: RADIOLOGY

## 2020-09-01 PROCEDURE — G6002 STEREOSCOPIC X-RAY GUIDANCE: HCPCS | Mod: 26 | Performed by: RADIOLOGY

## 2020-09-01 PROCEDURE — 77412 RADIATION TX DELIVERY LVL 3: CPT | Performed by: RADIOLOGY

## 2020-09-01 ASSESSMENT — PAIN SCALES - GENERAL: PAINLEVEL: NO PAIN (0)

## 2020-09-02 ENCOUNTER — RESULTS ONLY (OUTPATIENT)
Dept: RADIATION ONCOLOGY | Facility: HOSPITAL | Age: 80
End: 2020-09-02

## 2020-09-02 ENCOUNTER — APPOINTMENT (OUTPATIENT)
Dept: RADIATION ONCOLOGY | Facility: HOSPITAL | Age: 80
End: 2020-09-02
Payer: COMMERCIAL

## 2020-09-02 LAB
RAD ONC ARIA COURSE ID: NORMAL
RAD ONC ARIA COURSE LAST TREATMENT DATE: NORMAL
RAD ONC ARIA COURSE START DATE: NORMAL
RAD ONC ARIA COURSE TREATMENT ELAPSED DAYS: 13
RAD ONC ARIA FIRST TREATMENT DATE: NORMAL
RAD ONC ARIA PLAN FRACTIONS TREATED TO DATE: 10
RAD ONC ARIA PLAN ID: NORMAL
RAD ONC ARIA PLAN NAME: NORMAL
RAD ONC ARIA PLAN PRESCRIBED DOSE PER FRACTION: 3 GY
RAD ONC ARIA PLAN TOTAL FRACTIONS PRESCRIBED: 12
RAD ONC ARIA PLAN TOTAL PRESCRIBED DOSE: 3600 CGY
RAD ONC ARIA REFERENCE POINT DOSAGE GIVEN TO DATE: NORMAL GY
RAD ONC ARIA REFERENCE POINT DOSAGE GIVEN TO DATE: NORMAL GY
RAD ONC ARIA REFERENCE POINT ID: NORMAL
RAD ONC ARIA REFERENCE POINT ID: NORMAL

## 2020-09-02 PROCEDURE — 77412 RADIATION TX DELIVERY LVL 3: CPT | Performed by: RADIOLOGY

## 2020-09-02 PROCEDURE — G6002 STEREOSCOPIC X-RAY GUIDANCE: HCPCS | Mod: 26 | Performed by: RADIOLOGY

## 2020-09-02 PROCEDURE — 77427 RADIATION TX MANAGEMENT X5: CPT | Performed by: RADIOLOGY

## 2020-09-02 PROCEDURE — 77336 RADIATION PHYSICS CONSULT: CPT | Performed by: RADIOLOGY

## 2020-09-02 PROCEDURE — 77387 GUIDANCE FOR RADJ TX DLVR: CPT | Performed by: RADIOLOGY

## 2020-09-02 NOTE — PROGRESS NOTES
"[facility]  DEPARTMENT OF RADIATION ONCOLOGY   ON TREATMENT VISIT (OTV) NOTE     Name: Vikas Ji MRN: [id number]   : 1940 (80 year old)  Date of Service: 2020 Referring: Dr. 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  Presenting complaints included odynophagia, dysphagia, weight loss, fatigue. The patient is felt to be medically inoperable and medically not a candidate for chemotherapy. Among his pertinent comorbidities and circumstances are mild dementia and forgetfulness, chronic mild hearing loss,  \"ulcerative colitis\" (carries the diagnosis but not noted on recent colonoscopy), diverticular disease,  type 2 diabetes mellitus that requires metformin plus insulin, and significant diabetic peripheral neuropathy.     His daughter is an Oncology nurse.    Subjective:  Cumulative radiation dose to the esophageal target volume is 2700 cGy In 9 fractions of 300 cGy each.  A total dose of 3600 cGy in 12 fractions is planned.    He was not seen in the presence of a family member.  The quality of this interview is compromised by the impairment of his memory.  For instance, he does not recall the symptoms that he was having initially from the esophageal tumor.  He says that currently there is only a slight delay in the passage of swallowed food and liquids. He denies having any regurgitations, significant pains,.or nausea.  Bowels are moving satisfactorily    Objective: He appears comfortable and in no distress.    Assessment: Radiotherapy is being well-tolerated.    Plan: Complete the treatment " course with 3 more fractions this week    Russ Antonio MD

## 2020-09-03 ENCOUNTER — APPOINTMENT (OUTPATIENT)
Dept: RADIATION ONCOLOGY | Facility: HOSPITAL | Age: 80
End: 2020-09-03
Payer: COMMERCIAL

## 2020-09-03 ENCOUNTER — RESULTS ONLY (OUTPATIENT)
Dept: RADIATION ONCOLOGY | Facility: HOSPITAL | Age: 80
End: 2020-09-03

## 2020-09-03 LAB
RAD ONC ARIA COURSE ID: NORMAL
RAD ONC ARIA COURSE LAST TREATMENT DATE: NORMAL
RAD ONC ARIA COURSE START DATE: NORMAL
RAD ONC ARIA COURSE TREATMENT ELAPSED DAYS: 14
RAD ONC ARIA FIRST TREATMENT DATE: NORMAL
RAD ONC ARIA PLAN FRACTIONS TREATED TO DATE: 11
RAD ONC ARIA PLAN ID: NORMAL
RAD ONC ARIA PLAN NAME: NORMAL
RAD ONC ARIA PLAN PRESCRIBED DOSE PER FRACTION: 3 GY
RAD ONC ARIA PLAN TOTAL FRACTIONS PRESCRIBED: 12
RAD ONC ARIA PLAN TOTAL PRESCRIBED DOSE: 3600 CGY
RAD ONC ARIA REFERENCE POINT DOSAGE GIVEN TO DATE: NORMAL GY
RAD ONC ARIA REFERENCE POINT DOSAGE GIVEN TO DATE: NORMAL GY
RAD ONC ARIA REFERENCE POINT ID: NORMAL
RAD ONC ARIA REFERENCE POINT ID: NORMAL

## 2020-09-03 PROCEDURE — G6002 STEREOSCOPIC X-RAY GUIDANCE: HCPCS | Mod: 26 | Performed by: RADIOLOGY

## 2020-09-03 PROCEDURE — 77412 RADIATION TX DELIVERY LVL 3: CPT | Performed by: RADIOLOGY

## 2020-09-03 PROCEDURE — 77387 GUIDANCE FOR RADJ TX DLVR: CPT | Performed by: RADIOLOGY

## 2020-09-04 ENCOUNTER — APPOINTMENT (OUTPATIENT)
Dept: RADIATION ONCOLOGY | Facility: HOSPITAL | Age: 80
End: 2020-09-04
Payer: COMMERCIAL

## 2020-09-04 ENCOUNTER — RESULTS ONLY (OUTPATIENT)
Dept: RADIATION ONCOLOGY | Facility: HOSPITAL | Age: 80
End: 2020-09-04

## 2020-09-04 ENCOUNTER — OFFICE VISIT (OUTPATIENT)
Dept: RADIATION ONCOLOGY | Facility: HOSPITAL | Age: 80
End: 2020-09-04

## 2020-09-04 DIAGNOSIS — C15.5 PRIMARY ADENOCARCINOMA OF DISTAL THIRD OF ESOPHAGUS (H): Primary | ICD-10-CM

## 2020-09-04 LAB
RAD ONC ARIA COURSE ID: NORMAL
RAD ONC ARIA COURSE LAST TREATMENT DATE: NORMAL
RAD ONC ARIA COURSE START DATE: NORMAL
RAD ONC ARIA COURSE TREATMENT ELAPSED DAYS: 15
RAD ONC ARIA FIRST TREATMENT DATE: NORMAL
RAD ONC ARIA PLAN FRACTIONS TREATED TO DATE: 12
RAD ONC ARIA PLAN ID: NORMAL
RAD ONC ARIA PLAN NAME: NORMAL
RAD ONC ARIA PLAN PRESCRIBED DOSE PER FRACTION: 3 GY
RAD ONC ARIA PLAN TOTAL FRACTIONS PRESCRIBED: 12
RAD ONC ARIA PLAN TOTAL PRESCRIBED DOSE: 3600 CGY
RAD ONC ARIA REFERENCE POINT DOSAGE GIVEN TO DATE: NORMAL GY
RAD ONC ARIA REFERENCE POINT DOSAGE GIVEN TO DATE: NORMAL GY
RAD ONC ARIA REFERENCE POINT ID: NORMAL
RAD ONC ARIA REFERENCE POINT ID: NORMAL

## 2020-09-04 PROCEDURE — G6002 STEREOSCOPIC X-RAY GUIDANCE: HCPCS | Mod: 26 | Performed by: RADIOLOGY

## 2020-09-04 PROCEDURE — 77387 GUIDANCE FOR RADJ TX DLVR: CPT | Performed by: RADIOLOGY

## 2020-09-04 PROCEDURE — 77412 RADIATION TX DELIVERY LVL 3: CPT | Performed by: RADIOLOGY

## 2020-09-04 NOTE — PROGRESS NOTES
[facility]  DEPARTMENT OF RADIATION ONCOLOGY  TREATMENT SUMMARY NOTE    Name: Vikas Ji MRN: [id number]   : 1940 (80 year old)  Date of Service: 2020 Referring: No ref. provider found       Diagnosis and Cancer Staging  Adenocarcinoma of esophagus (H)  Staging form: Esophagus - Adenocarcinoma, AJCC 8th Edition  - Clinical stage from 8/10/2020: Stage IIB (cT2, cN0, cM0, G2) - Signed by Hero Ramirez MD on 8/10/2020      RADIATION THERAPY:  Treatment intent: Palliative.  Primary area treated: esophagus.  Primary treatment technique: 3D conformal radiation..  Primary energy: 10 MV.  Primary tumor dose: 3600 cGy in 300 cGy fractions.  Primary number of treatments: 12 fractions.  Total number treatments and dose: 3600 cGy in 12 fractions.  Elapsed calendar days: 15 elapsed days   Completion date: 2020.    SUMMARY: He demonstrated routine tolerance to radiation therapy. The patient will follow-up with medical oncology, as the principal source of oncologic care. Because of the ongoing COVID-19 pandemic, we will call the patient in 1-2 weeks for a telemedicine visit and request evaluation in our clinic if indicated. We counseled the patient to continue to see his primary care service for general and maintenance health issues. We also counseled the patient about COVID-19 issues. He wished to proceed as recommended. We answered all questions to his satisfaction. Thank you for allowing us to participate in the care of this very pleasant patient.     MEDICATIONS:  Current Outpatient Medications   Medication Sig Dispense Refill     aspirin 81 MG chewable tablet Take 81 mg by mouth once       calcium & magnesium carbonates 311-232 MG TABS Take 3 tablets by mouth daily       glipiZIDE (GLUCOTROL) 10 MG tablet Take 1 tablet (10 mg) by mouth 3 times daily (before meals) -- Dose Increase 2020 180 tablet 3     losartan (COZAAR) 100 MG tablet Take 1 tablet (100 mg) by mouth daily 90  tablet 3     metFORMIN (GLUCOPHAGE) 1000 MG tablet Take 1 tablet by mouth 2 times daily (with meals)       Multiple Vitamin (MULTI-VITAMINS) TABS Take 2 tablets by mouth daily       omeprazole (PRILOSEC) 20 MG DR capsule Take 1 capsule (20 mg) by mouth daily 90 capsule 3     simvastatin (ZOCOR) 40 MG tablet Take 1 tablet (40 mg) by mouth At Bedtime 90 tablet 3         ALLERGIES:    No Known Allergies      Russ Antonio MD

## 2020-09-11 ENCOUNTER — OFFICE VISIT (OUTPATIENT)
Dept: INTERNAL MEDICINE | Facility: OTHER | Age: 80
End: 2020-09-11
Attending: INTERNAL MEDICINE
Payer: COMMERCIAL

## 2020-09-11 VITALS
SYSTOLIC BLOOD PRESSURE: 118 MMHG | WEIGHT: 155.25 LBS | RESPIRATION RATE: 20 BRPM | HEIGHT: 68 IN | HEART RATE: 88 BPM | DIASTOLIC BLOOD PRESSURE: 68 MMHG | BODY MASS INDEX: 23.53 KG/M2 | TEMPERATURE: 96.5 F

## 2020-09-11 DIAGNOSIS — E78.2 MIXED HYPERLIPIDEMIA: ICD-10-CM

## 2020-09-11 DIAGNOSIS — C15.9 ADENOCARCINOMA OF ESOPHAGUS (H): ICD-10-CM

## 2020-09-11 DIAGNOSIS — I10 BENIGN ESSENTIAL HYPERTENSION: ICD-10-CM

## 2020-09-11 DIAGNOSIS — Z72.0 CHEWING TOBACCO USE: ICD-10-CM

## 2020-09-11 DIAGNOSIS — R41.3 MEMORY PROBLEM: ICD-10-CM

## 2020-09-11 DIAGNOSIS — E11.9 CONTROLLED TYPE 2 DIABETES MELLITUS WITHOUT COMPLICATION, WITH LONG-TERM CURRENT USE OF INSULIN (H): Primary | ICD-10-CM

## 2020-09-11 DIAGNOSIS — Z79.4 CONTROLLED TYPE 2 DIABETES MELLITUS WITHOUT COMPLICATION, WITH LONG-TERM CURRENT USE OF INSULIN (H): Primary | ICD-10-CM

## 2020-09-11 DIAGNOSIS — R12 HEARTBURN: ICD-10-CM

## 2020-09-11 DIAGNOSIS — Z00.00 ENCOUNTER FOR MEDICARE ANNUAL WELLNESS EXAM: ICD-10-CM

## 2020-09-11 PROBLEM — E16.2 HYPOGLYCEMIA: Status: RESOLVED | Noted: 2020-08-16 | Resolved: 2020-09-11

## 2020-09-11 PROCEDURE — G0438 PPPS, INITIAL VISIT: HCPCS | Performed by: INTERNAL MEDICINE

## 2020-09-11 PROCEDURE — 99213 OFFICE O/P EST LOW 20 MIN: CPT | Mod: 25 | Performed by: INTERNAL MEDICINE

## 2020-09-11 PROCEDURE — G0463 HOSPITAL OUTPT CLINIC VISIT: HCPCS

## 2020-09-11 RX ORDER — SIMVASTATIN 40 MG
40 TABLET ORAL AT BEDTIME
Qty: 90 TABLET | Refills: 3 | Status: SHIPPED | OUTPATIENT
Start: 2020-09-11 | End: 2021-01-01

## 2020-09-11 RX ORDER — LOSARTAN POTASSIUM 100 MG/1
100 TABLET ORAL DAILY
Qty: 90 TABLET | Refills: 3 | Status: SHIPPED | OUTPATIENT
Start: 2020-09-11 | End: 2021-01-01

## 2020-09-11 RX ORDER — LANOLIN ALCOHOL/MO/W.PET/CERES
1000 CREAM (GRAM) TOPICAL DAILY
COMMUNITY
Start: 2020-09-11 | End: 2021-01-01

## 2020-09-11 ASSESSMENT — MIFFLIN-ST. JEOR: SCORE: 1379.74

## 2020-09-11 ASSESSMENT — ENCOUNTER SYMPTOMS
VOMITING: 0
DIZZINESS: 0
COUGH: 1
CHILLS: 0
WHEEZING: 1
LIGHT-HEADEDNESS: 0
FEVER: 0
AGITATION: 0
BRUISES/BLEEDS EASILY: 0
ABDOMINAL PAIN: 0
SHORTNESS OF BREATH: 1
CONFUSION: 1
DIARRHEA: 0
ARTHRALGIAS: 0
WOUND: 0
DYSURIA: 0
MYALGIAS: 0
PALPITATIONS: 0
HEMATURIA: 0
NAUSEA: 0

## 2020-09-11 ASSESSMENT — PAIN SCALES - GENERAL: PAINLEVEL: NO PAIN (0)

## 2020-09-11 NOTE — NURSING NOTE
"Patient presents to the clinic for medicare wellness, answers provided with patient and family.      Previous A1C is at goal of <8  Lab Results   Component Value Date    A1C 5.8 08/16/2020    A1C 5.8 09/18/2019    A1C 6.7 11/28/2018     Urine microalbumin:creatine: n/a  Foot exam okay per daughter  Eye exam okay per daughter    Tobacco User YES  Patient is on a daily aspirin  Patient is on a Statin.  Blood pressure today of:     BP Readings from Last 1 Encounters:   09/11/20 118/68      is at the goal of <139/89 for diabetics.    Chief Complaint   Patient presents with     Medicare Visit     wellness       Initial /68 (BP Location: Right arm, Patient Position: Sitting, Cuff Size: Adult Regular)   Pulse 88   Temp 96.5  F (35.8  C) (Tympanic)   Resp 20   Ht 1.721 m (5' 7.75\")   Wt 70.4 kg (155 lb 4 oz)   BMI 23.78 kg/m   Estimated body mass index is 23.78 kg/m  as calculated from the following:    Height as of this encounter: 1.721 m (5' 7.75\").    Weight as of this encounter: 70.4 kg (155 lb 4 oz).  Medication Reconciliation: complete    Yesenia Vargas LPN        "

## 2020-09-11 NOTE — PROGRESS NOTES
"SUBJECTIVE:   Vikas Ji is a 80 year old male who presents for Preventive Visit.  Are you in the first 12 months of your Medicare Part B coverage?  No    Physical Health:    In general, how would you rate your overall physical health? good    Outside of work, how many days during the week do you exercise? 6-7 days/week    Outside of work, approximately how many minutes a day do you exercise?15-30 minutes    If you drink alcohol do you typically have >3 drinks per day or >7 drinks per week? No    Do you usually eat at least 4 servings of fruit and vegetables a day, include whole grains & fiber and avoid regularly eating high fat or \"junk\" foods? NO and NO    Do you have any problems taking medications regularly?  No    Do you have any side effects from medications? none    Needs assistance for the following daily activities: no assistance needed    Which of the following safety concerns are present in your home?  none identified     Hearing impairment: Yes[facility]    In the past 6 months, have you been bothered by leaking of urine? YES    Mental Health:    In general, how would you rate your overall mental or emotional health? excellent  PHQ-2 Score:      Do you feel safe in your environment? Yes    Have you ever done Advance Care Planning? (For example, a Health Directive, POLST, or a discussion with a medical provider or your loved ones about your wishes): Yes, advance care planning is on file.    Additional concerns to address?  YES    Fall risk:  Fallen 2 or more times in the past year?: No  Any fall with injury in the past year?: No    Cognitive Screening: Not appropriate due to known dementia    Do you have sleep apnea, excessive snoring or daytime drowsiness?: n/a    Reviewed and updated as needed this visit by clinical staff  Tobacco  Allergies  Meds  Problems  Med Hx  Surg Hx  Fam Hx         Reviewed and updated as needed this visit by Provider  Tobacco  " Allergies  Meds  Problems  Med Hx  Surg Hx  Fam Hx        Social History     Tobacco Use     Smoking status: Former Smoker     Packs/day: 0.60     Years: 30.00     Pack years: 18.00     Types: Cigarettes     Last attempt to quit: 1987     Years since quittin.7     Smokeless tobacco: Current User     Types: Chew   Substance Use Topics     Alcohol use: Yes     Alcohol/week: 10.0 standard drinks     Types: 10 Cans of beer per week     Frequency: 4 or more times a week     Drinks per session: 1 or 2                           Current providers sharing in care for this patient include:   Patient Care Team:  Ace Salter MD as PCP - General (Internal Medicine)  Ace Salter MD as Assigned PCP  Darcy Griffin MD as MD (Surgery)  Eliseo Arshad MD as MD (General Surgery)  Christine Rodriguez MD as MD (Hematology & Oncology)    The following health maintenance items are reviewed in Epic and correct as of today:  Health Maintenance   Topic Date Due     LIPID  2020     MICROALBUMIN  2020     DIABETIC FOOT EXAM  2020 (Originally 2019)     EYE EXAM  2020 (Originally 2020)     INFLUENZA VACCINE (1) 2020 (Originally 2020)     ZOSTER IMMUNIZATION (2 of 2) 2020 (Originally 2019)     DTAP/TDAP/TD IMMUNIZATION (1 - Tdap) 2020 (Originally 1964)     HEPATITIS B IMMUNIZATION (1 of 3 - Risk 3-dose series) 2020 (Originally 1958)     A1C  2021     FALL RISK ASSESSMENT  08/10/2021     BMP  2021     MEDICARE ANNUAL WELLNESS VISIT  2021     ADVANCE CARE PLANNING  2025     PHQ-2  Completed     PNEUMOCOCCAL IMMUNIZATION 65+ LOW/MEDIUM RISK  Completed     IPV IMMUNIZATION  Aged Out     MENINGITIS IMMUNIZATION  Aged Out     Review of Systems   Constitutional: Negative for chills and fever.   HENT: Negative for congestion and hearing loss.    Eyes: Negative for visual disturbance.   Respiratory: Positive for cough (and  "phlegm, mostly in AM), shortness of breath and wheezing.    Cardiovascular: Negative for chest pain and palpitations.   Gastrointestinal: Negative for abdominal pain, diarrhea, nausea and vomiting.        Poor appetite. Esophageal cancer - getting radiation treatments   Endocrine: Negative for cold intolerance and heat intolerance.   Genitourinary: Negative for dysuria and hematuria.   Musculoskeletal: Positive for gait problem (+ some balance problems, Walks with cane). Negative for arthralgias and myalgias.   Skin: Negative for rash and wound.   Allergic/Immunologic: Negative for immunocompromised state.   Neurological: Negative for dizziness and light-headedness.   Hematological: Does not bruise/bleed easily.   Psychiatric/Behavioral: Positive for confusion (+ memory loss). Negative for agitation.        OBJECTIVE:   /68 (BP Location: Right arm, Patient Position: Sitting, Cuff Size: Adult Regular)   Pulse 88   Temp 96.5  F (35.8  C) (Tympanic)   Resp 20   Ht 1.721 m (5' 7.75\")   Wt 70.4 kg (155 lb 4 oz)   BMI 23.78 kg/m   Estimated body mass index is 23.78 kg/m  as calculated from the following:    Height as of this encounter: 1.721 m (5' 7.75\").    Weight as of this encounter: 70.4 kg (155 lb 4 oz).  Physical Exam  Constitutional:       General: He is not in acute distress.     Appearance: He is well-developed. He is not diaphoretic.   HENT:      Head: Normocephalic and atraumatic.   Eyes:      General: No scleral icterus.     Conjunctiva/sclera: Conjunctivae normal.   Neck:      Musculoskeletal: Neck supple.      Vascular: No carotid bruit.   Cardiovascular:      Rate and Rhythm: Normal rate and regular rhythm.      Pulses: Normal pulses.   Pulmonary:      Effort: Pulmonary effort is normal.      Breath sounds: Normal breath sounds.   Abdominal:      Palpations: Abdomen is soft.      Tenderness: There is no abdominal tenderness.   Musculoskeletal:         General: No deformity.      Right lower leg: " No edema.      Left lower leg: No edema.   Lymphadenopathy:      Cervical: No cervical adenopathy.   Skin:     General: Skin is warm and dry.      Findings: No rash.   Neurological:      Mental Status: He is alert and oriented to person, place, and time. Mental status is at baseline.   Psychiatric:         Mood and Affect: Mood normal.         Behavior: Behavior normal.        Diagnostic Test Results:  Labs reviewed in Epic    ASSESSMENT / PLAN:       ICD-10-CM    1. Controlled type 2 diabetes mellitus without complication, with long-term current use of insulin (H)  E11.9 metFORMIN (GLUCOPHAGE) 1000 MG tablet    Z79.4    2. Mixed hyperlipidemia  E78.2 simvastatin (ZOCOR) 40 MG tablet   3. Benign essential hypertension  I10 losartan (COZAAR) 100 MG tablet   4. Heartburn  R12 omeprazole (PRILOSEC) 20 MG DR capsule   5. Chewing tobacco use  Z72.0    6. Memory problem  R41.3    7. Adenocarcinoma of esophagus (H)  C15.9    8. Encounter for Medicare annual wellness exam  Z00.00       Patient comes in with his wife and daughter for annual Medicare wellness exam as well as follow-up of multiple issues.      Type 2 diabetes, had hypoglycemia and hospitalization earlier this Summer/fall.  Now just on glipizide and metformin.  We got rid of the long-acting insulin.  He took an extra dose due to his memory issues he did not remember that he took the initial dose.    He seems to be doing much better with the oral medications.  No more hypoglycemic issues but his blood sugars are on the higher end.  Typically running 180 up to 250 in the morning 190-290 in the afternoon and anywhere from 97 up to 226 in the evening.    He has not had a great appetite recently.  He typically will have some nausea and vomiting in the morning.  He has been getting radiation treatments for esophageal adenocarcinoma, palliative treatments.  --Encouraged high calorie/high fat diet.  Protein supplement/boost.  He does like the chocolate boost shakes.  +  "Was losing a lot of weight because of his esophageal cancer.  Due to some dietary changes this has stabilized.  Hopefully he can start to put on some weight when he tolerates eating.    Mixed hyperlipidemia, currently on simvastatin.  Likely will be able to stop this depending on how he does with his cancer treatments they would like to continue for now.  Refill sent to pharmacy.    Hypertension, blood pressures previously quite high.,  Much better today.  Doing well with losartan.  Needs refills.    Heartburn, better with use of omeprazole.  Needs refills.    Chewing tobacco use, continues.  He has been doing this most of his life.    Memory problems, chronic, daughter does seem to help with his medications and his wife also has been monitoring them more as well.    COUNSELING:  Reviewed preventive health counseling, as reflected in patient instructions  Special attention given to:       Regular exercise       Healthy diet/nutrition       Vision screening       Hearing screening       Dental care       Bladder control       Fall risk prevention       Immunizations    Estimated body mass index is 23.78 kg/m  as calculated from the following:    Height as of this encounter: 1.721 m (5' 7.75\").    Weight as of this encounter: 70.4 kg (155 lb 4 oz).        He reports that he quit smoking about 33 years ago. His smoking use included cigarettes. He has a 18.00 pack-year smoking history. His smokeless tobacco use includes chew.    Appropriate preventive services were discussed with this patient, including applicable screening as appropriate for cardiovascular disease, diabetes, osteopenia/osteoporosis, and glaucoma.  As appropriate for age/gender, discussed screening for colorectal cancer, prostate cancer, breast cancer, and cervical cancer. Checklist reviewing preventive services available has been given to the patient.    Reviewed patients plan of care and provided an AVS. The Complex Care Plan (for patients with higher " acuity and needing more deliberate coordination of services) for Vikas meets the Care Plan requirement. This Care Plan has been established and reviewed with the Patient and spouse and daughter.    Counseling Resources:  ATP IV Guidelines  Pooled Cohorts Equation Calculator  Breast Cancer Risk Calculator  BRCA-Related Cancer Risk Assessment: FHS-7 Tool  FRAX Risk Assessment  ICSI Preventive Guidelines  Dietary Guidelines for Americans, 2010  USDA's MyPlate  ASA Prophylaxis  Lung CA Screening    Ace Salter MD  [facility] AND [facility]

## 2020-09-11 NOTE — PATIENT INSTRUCTIONS
Patient Education   Personalized Prevention Plan  You are due for the preventive services outlined below.  Your care team is available to assist you in scheduling these services.  If you have already completed any of these items, please share that information with your care team to update in your medical record.  Health Maintenance Due   Topic Date Due     Cholesterol Lab  09/18/2020     Kidney Microalbumin Urine Test  09/19/2020     Preventive Health Recommendations  See your health care provider every year to    Review health changes.     Discuss preventive care.      Review your medicines if your doctor has prescribed any.    Talk with your health care provider about whether you should have a test to screen for prostate cancer (PSA).    Every 3 years, have a diabetes test (fasting glucose). If you are at risk for diabetes, you should have this test more often.    Every 5 years, have a cholesterol test. Have this test more often if you are at risk for high cholesterol or heart disease.     Every 10 years, have a colonoscopy. Or, have a yearly FIT test (stool test). These exams will check for colon cancer.    Talk to with your health care provider about screening for Abdominal Aortic Aneurysm if you have a family history of AAA or have a history of smoking.    Shots:     Get a flu shot each year.     Get a tetanus shot every 10 years.     Talk to your doctor about your pneumonia vaccines. There are now two you should receive - Pneumovax (PPSV 23) and Prevnar (PCV 13).    Talk to your pharmacist about a shingles vaccine.     Talk to your doctor about the hepatitis B vaccine.    Nutrition:     Eat at least 5 servings of fruits and vegetables each day.     Eat whole-grain bread, whole-wheat pasta and brown rice instead of white grains and rice.     Get adequate Calcium and Vitamin D.     Lifestyle    Exercise for at least 150 minutes a week (30 minutes a day, 5 days a week). This will help you control your weight  and prevent disease.     Limit alcohol to one drink per day.     No smoking.     Wear sunscreen to prevent skin cancer.     See your dentist every six months for an exam and cleaning.     See your eye doctor every 1 to 2 years to screen for conditions such as glaucoma, macular degeneration and cataracts.    Personalized Prevention Plan  You are due for the preventive services outlined below.  Your care team is available to assist you in scheduling these services.  If you have already completed any of these items, please share that information with your care team to update in your medical record.  Health Maintenance   Topic Date Due     LIPID  09/18/2020     MICROALBUMIN  09/19/2020     DIABETIC FOOT EXAM  09/12/2020 (Originally 11/28/2019)     EYE EXAM  09/12/2020 (Originally 1/9/2020)     INFLUENZA VACCINE (1) 09/12/2020 (Originally 9/1/2020)     ZOSTER IMMUNIZATION (2 of 2) 09/12/2020 (Originally 12/27/2019)     DTAP/TDAP/TD IMMUNIZATION (1 - Tdap) 09/12/2020 (Originally 1/18/1964)     HEPATITIS B IMMUNIZATION (1 of 3 - Risk 3-dose series) 09/12/2020 (Originally 1/18/1958)     A1C  02/16/2021     FALL RISK ASSESSMENT  08/10/2021     BMP  08/16/2021     MEDICARE ANNUAL WELLNESS VISIT  09/11/2021     ADVANCE CARE PLANNING  09/11/2025     PHQ-2  Completed     PNEUMOCOCCAL IMMUNIZATION 65+ LOW/MEDIUM RISK  Completed     IPV IMMUNIZATION  Aged Out     MENINGITIS IMMUNIZATION  Aged Out         Continue boost / protein supplements for extra calories.     Diabetes is well controlled.   Glucose levels are no longer SUPER Low....     Hopefully your appetite picks up some more.     Medications refilled.     Blood pressure is well controlled.     Return for Diabetes labs and clinic follow-up appointment every 3 to 4 months.  --- (Go for about 91 to 100 days)    Aspects of Diabetes we can improve:  Hemoglobin A1c Lab Results   Component Value Date    A1C 5.8 08/16/2020    A1C 5.8 09/18/2019    A1C 6.7 11/28/2018    Goal range  is under 8. Best is 6.5 to 7   Blood Pressure 118/68 Goal to keep less than 140/90   Tobacco  reports that he quit smoking about 33 years ago. His smoking use included cigarettes. He has a 18.00 pack-year smoking history. His smokeless tobacco use includes chew. Goal to abstain from tobacco   Eye Exam -- Do Yearly -- Annual diabetic eye exam   Healthy weight Body mass index is 23.78 kg/m . Goal BMI under 30, best is under 25.      -- Trying to exercise daily (goal at least 20 min/day) with moderate aerobic activity   -- Eat healthy (resources from ADA at http://www.diabetes.org/)   -- Taking good care of my feet. Consider seeing the Podiatrist   -- Check blood sugars as directed, record in log book and bring to every appointment      Schedule lab only appointment --- A few days AFTER: 12/10/20   Schedule clinic appointment with Dr. Salter -- Same day as labs, or 1-2 days later.     Insurance companies are now grading you and I on your blood sugar control -- Goal is to get your A1c down to 7.9% or lower and NO Smoking!    -- Medicare and most insurance companies, will only cover Hemoglobin A1c labs to be rechecked every 91+ days.      Hemoglobin A1C   Date Value Ref Range Status   08/16/2020 5.8 4.0 - 6.0 % Final   09/18/2019 5.8 4.0 - 6.0 % Final       Next follow-up appointment with Dr. Salter should be scheduled:  -- Approximately a few days AFTER: 12/10/20

## 2020-09-18 ENCOUNTER — TELEPHONE (OUTPATIENT)
Dept: RADIATION ONCOLOGY | Facility: HOSPITAL | Age: 80
End: 2020-09-18

## 2020-09-23 DIAGNOSIS — C15.9 ADENOCARCINOMA OF ESOPHAGUS (H): Primary | ICD-10-CM

## 2020-09-24 ENCOUNTER — ONCOLOGY VISIT (OUTPATIENT)
Dept: ONCOLOGY | Facility: OTHER | Age: 80
End: 2020-09-24
Attending: INTERNAL MEDICINE
Payer: COMMERCIAL

## 2020-09-24 VITALS
RESPIRATION RATE: 18 BRPM | DIASTOLIC BLOOD PRESSURE: 64 MMHG | WEIGHT: 155 LBS | SYSTOLIC BLOOD PRESSURE: 118 MMHG | BODY MASS INDEX: 23.49 KG/M2 | HEART RATE: 96 BPM | TEMPERATURE: 97.6 F | HEIGHT: 68 IN

## 2020-09-24 DIAGNOSIS — C15.9 ADENOCARCINOMA OF ESOPHAGUS (H): ICD-10-CM

## 2020-09-24 DIAGNOSIS — R11.0 NAUSEA: Primary | ICD-10-CM

## 2020-09-24 DIAGNOSIS — E86.0 DEHYDRATION: ICD-10-CM

## 2020-09-24 DIAGNOSIS — E87.1 HYPONATREMIA: ICD-10-CM

## 2020-09-24 DIAGNOSIS — C15.5 MALIGNANT NEOPLASM OF LOWER THIRD OF ESOPHAGUS (H): ICD-10-CM

## 2020-09-24 LAB
ALBUMIN SERPL-MCNC: 4.1 G/DL (ref 3.5–5.7)
ALP SERPL-CCNC: 53 U/L (ref 34–104)
ALT SERPL W P-5'-P-CCNC: 21 U/L (ref 7–52)
ANION GAP SERPL CALCULATED.3IONS-SCNC: 8 MMOL/L (ref 3–14)
AST SERPL W P-5'-P-CCNC: 17 U/L (ref 13–39)
BASOPHILS # BLD AUTO: 0 10E9/L (ref 0–0.2)
BASOPHILS NFR BLD AUTO: 0.6 %
BILIRUB SERPL-MCNC: 0.5 MG/DL (ref 0.3–1)
BUN SERPL-MCNC: 17 MG/DL (ref 7–25)
CALCIUM SERPL-MCNC: 9.7 MG/DL (ref 8.6–10.3)
CEA SERPL-MCNC: <3 NG/ML
CHLORIDE SERPL-SCNC: 91 MMOL/L (ref 98–107)
CO2 SERPL-SCNC: 26 MMOL/L (ref 21–31)
CREAT SERPL-MCNC: 0.81 MG/DL (ref 0.7–1.3)
DIFFERENTIAL METHOD BLD: ABNORMAL
EOSINOPHIL # BLD AUTO: 0.2 10E9/L (ref 0–0.7)
EOSINOPHIL NFR BLD AUTO: 2.9 %
ERYTHROCYTE [DISTWIDTH] IN BLOOD BY AUTOMATED COUNT: 11.8 % (ref 10–15)
GFR SERPL CREATININE-BSD FRML MDRD: >90 ML/MIN/{1.73_M2}
GLUCOSE SERPL-MCNC: 326 MG/DL (ref 70–105)
HCT VFR BLD AUTO: 36.5 % (ref 40–53)
HGB BLD-MCNC: 13 G/DL (ref 13.3–17.7)
IMM GRANULOCYTES # BLD: 0 10E9/L (ref 0–0.4)
IMM GRANULOCYTES NFR BLD: 0.3 %
LDH SERPL L TO P-CCNC: 100 U/L (ref 140–271)
LYMPHOCYTES # BLD AUTO: 0.4 10E9/L (ref 0.8–5.3)
LYMPHOCYTES NFR BLD AUTO: 6.2 %
MCH RBC QN AUTO: 31.5 PG (ref 26.5–33)
MCHC RBC AUTO-ENTMCNC: 35.6 G/DL (ref 31.5–36.5)
MCV RBC AUTO: 88 FL (ref 78–100)
MONOCYTES # BLD AUTO: 0.6 10E9/L (ref 0–1.3)
MONOCYTES NFR BLD AUTO: 8.1 %
NEUTROPHILS # BLD AUTO: 5.7 10E9/L (ref 1.6–8.3)
NEUTROPHILS NFR BLD AUTO: 81.9 %
PLATELET # BLD AUTO: 193 10E9/L (ref 150–450)
POTASSIUM SERPL-SCNC: 4.5 MMOL/L (ref 3.5–5.1)
PROT SERPL-MCNC: 6.5 G/DL (ref 6.4–8.9)
RBC # BLD AUTO: 4.13 10E12/L (ref 4.4–5.9)
SODIUM SERPL-SCNC: 125 MMOL/L (ref 134–144)
WBC # BLD AUTO: 6.9 10E9/L (ref 4–11)

## 2020-09-24 PROCEDURE — 82378 CARCINOEMBRYONIC ANTIGEN: CPT | Mod: ZL | Performed by: INTERNAL MEDICINE

## 2020-09-24 PROCEDURE — 80053 COMPREHEN METABOLIC PANEL: CPT | Mod: ZL | Performed by: INTERNAL MEDICINE

## 2020-09-24 PROCEDURE — G0463 HOSPITAL OUTPT CLINIC VISIT: HCPCS

## 2020-09-24 PROCEDURE — 83615 LACTATE (LD) (LDH) ENZYME: CPT | Mod: ZL | Performed by: INTERNAL MEDICINE

## 2020-09-24 PROCEDURE — 99215 OFFICE O/P EST HI 40 MIN: CPT | Performed by: INTERNAL MEDICINE

## 2020-09-24 PROCEDURE — 36415 COLL VENOUS BLD VENIPUNCTURE: CPT | Mod: ZL | Performed by: INTERNAL MEDICINE

## 2020-09-24 PROCEDURE — 85025 COMPLETE CBC W/AUTO DIFF WBC: CPT | Mod: ZL | Performed by: INTERNAL MEDICINE

## 2020-09-24 RX ORDER — ONDANSETRON 4 MG/1
4 TABLET, FILM COATED ORAL EVERY 8 HOURS PRN
Qty: 30 TABLET | Refills: 3 | Status: ON HOLD | OUTPATIENT
Start: 2020-09-24 | End: 2021-01-01

## 2020-09-24 RX ORDER — OMEPRAZOLE 20 MG/1
20 TABLET, DELAYED RELEASE ORAL DAILY
Qty: 90 TABLET | Refills: 3 | Status: SHIPPED | OUTPATIENT
Start: 2020-09-24 | End: 2021-01-01

## 2020-09-24 ASSESSMENT — PAIN SCALES - GENERAL: PAINLEVEL: NO PAIN (0)

## 2020-09-24 ASSESSMENT — MIFFLIN-ST. JEOR: SCORE: 1378.71

## 2020-09-24 NOTE — NURSING NOTE
"Chief Complaint   Patient presents with     RECHECK     Esophageal cancer   Complains of having no appetite and starting to lose weight.  Worse in the mornings when he vomits up phlegm.  Pain is on and off in the mid upper abdominal area a week after starting radiation.  Seems to be improving slowly.    Initial /64   Pulse 96   Temp 97.6  F (36.4  C) (Oral)   Resp 18   Ht 1.721 m (5' 7.76\")   Wt 70.3 kg (155 lb)   BMI 23.74 kg/m   Estimated body mass index is 23.74 kg/m  as calculated from the following:    Height as of this encounter: 1.721 m (5' 7.76\").    Weight as of this encounter: 70.3 kg (155 lb).  Medication Reconciliation: complete    Malena Mendoza CMA ([facility])  "

## 2020-09-24 NOTE — PROGRESS NOTES
Visit Date:   09/24/2020      HEMATOLOGY ONCOLOGY CLINIC NOTE       Mr. 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  We had seen the patient in consultation at the request of Dr. rAshad as well as Dr. Salter on 07/30/2020.  At that time, Mr. iJ was an 80-year-old white male with a history of dementia, type 2 diabetes mellitus we were asked to evaluate concerning diagnosis of adenocarcinoma of the distal esophagus.  Apparently, he had presented in June of this year with dysphagia to solids, nausea, abdominal pain as well as constipation.  He was seen by Dr. Arshad, who performed an EGD, which revealed there was a mass in the distal esophagus.  This was performed on 07/30/2020.  Pathology revealed that there was mildly differentiated adenocarcinoma, HER-2/edd by IHC was negative.  The patient also had a PET scan and MRI of the brain.  PET scan was done on 07/22 and the findings were that there was focal hypermetabolism in the distal esophagus compatible with esophageal carcinoma with no evidence of hypermetabolic metastatic disease.  There was no lymphadenopathy noted.  MRI of the brain revealed no evidence of metastases.  There was mild small vessel ischemic change or generalized atrophy.  The patient suffered from mild dementia, also type 2 diabetes mellitus.  He had dysphagia to solids, but could tolerate liquids and soft foods.  When we saw the patient, given his underlying dementia, age and comorbidities, we felt he would not be a surgical candidate and also we felt given his underlying neuropathy as well as diabetes he would not be a candidate for chemotherapy.  I therefore referred the patient to Radiation Oncology for palliative radiation therapy.  The patient went on to have radiation therapy, which was completed on 09/10.  He received a total of 3600 cGy in 300 Gy fractions, he received 12 fractions over 15 elapsed days.  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  The patient comes in today.  He has been having some nausea and has not been taking p.o. well.  He can take liquids and solids how he is able to swallow, but he has not been eating.  The patient's history is mostly obtained from his daughter as well as his wife.  The patient is demented and cannot give a history.  He has also had some nausea.      PHYSICAL EXAMINATION:   GENERAL:  He is a demented elderly white male in no acute distress.   VITAL SIGNS:  Blood pressure 118/64, pulse 96, respiratory rate 18, temp 97.6.   HEENT:  Atraumatic, normocephalic.  Oropharynx is nonerythematous.   NECK:  Supple.   LUNGS:  Clear to auscultation and percussion.   HEART:  Regular rhythm.  S1, S2 normal.   ABDOMEN:  Soft.  Normoactive bowel sounds.  Some mild tenderness in the epigastrium to palpation, no rebound.   LYMPHATICS:  No cervical, supraclavicular, axillary or inguinal nodes.   EXTREMITIES:  Without edema.   NEUROLOGIC:  Significant for dementia.      LABORATORY DATA:  CBC with white count 6.9, H and H 13.0 and 36.5, platelet count is 193.  BUN 17, creatinine 0.81.  LDH is 100.      IMPRESSION:     1.  Adenocarcinoma of the distal esophagus, clinical T2 N0 M0, stage IIB, moderately differentiated adenocarcinoma of the esophagus.  The patient was not deemed a surgical candidate or a chemotherapy candidate.  He received palliative radiation therapy, and completed this on 09/04.  Now with esophagitis, nausea, dehydration, hyponatremia.  The plan is to hydrate with 1 liter of normal saline.  We will also start the patient on omeprazole 20 mg daily, Zofran 4 mg q.8 hours p.r.n.  We will also give him Zofran IV 8 mg IV with fluids tomorrow.  Otherwise, we will recheck labs in 4 weeks.  If sodium remains low, we will need to hydrate him again.   2.  Hyperglycemia.  The patient is being followed by Dr. Salter for management of his diabetes.   The patient will follow-up with Dr. Salter for his diabetes management.  Otherwise, we will obtain a PET scan in 2 months to assess response to radiation.      Forty minutes were spent with the patient; greater than half the time was spent in counseling and coordinating of care.         JULY BEGUM MD             D: 2020   T: 2020   MT: JENNY      Name:     DYLON DESOUZA   MRN:      [id number]        Account:      [id number]   :      1940           Visit Date:   2020      Document: [id number]       cc: Ace Antonio MD

## 2020-09-25 ENCOUNTER — HOSPITAL ENCOUNTER (OUTPATIENT)
Dept: INFUSION THERAPY | Facility: OTHER | Age: 80
Discharge: HOME | End: 2020-09-25
Attending: INTERNAL MEDICINE | Admitting: INTERNAL MEDICINE
Payer: COMMERCIAL

## 2020-09-25 VITALS
TEMPERATURE: 96.8 F | DIASTOLIC BLOOD PRESSURE: 57 MMHG | RESPIRATION RATE: 20 BRPM | SYSTOLIC BLOOD PRESSURE: 113 MMHG | HEART RATE: 78 BPM

## 2020-09-25 DIAGNOSIS — E87.1 HYPONATREMIA: ICD-10-CM

## 2020-09-25 DIAGNOSIS — E86.0 DEHYDRATION: Primary | ICD-10-CM

## 2020-09-25 PROCEDURE — 96365 THER/PROPH/DIAG IV INF INIT: CPT

## 2020-09-25 PROCEDURE — 25000128 H RX IP 250 OP 636: Performed by: INTERNAL MEDICINE

## 2020-09-25 PROCEDURE — 96374 THER/PROPH/DIAG INJ IV PUSH: CPT

## 2020-09-25 PROCEDURE — 25800030 ZZH RX IP 258 OP 636: Performed by: INTERNAL MEDICINE

## 2020-09-25 PROCEDURE — 96361 HYDRATE IV INFUSION ADD-ON: CPT

## 2020-09-25 RX ORDER — ONDANSETRON 2 MG/ML
8 INJECTION INTRAMUSCULAR; INTRAVENOUS ONCE
Status: COMPLETED | OUTPATIENT
Start: 2020-09-25 | End: 2020-09-25

## 2020-09-25 RX ADMIN — SODIUM CHLORIDE 1000 ML: 9 INJECTION, SOLUTION INTRAVENOUS at 08:42

## 2020-09-25 RX ADMIN — ONDANSETRON HYDROCHLORIDE 8 MG: 2 SOLUTION INTRAMUSCULAR; INTRAVENOUS at 10:49

## 2020-09-25 NOTE — PROGRESS NOTES
Infusion Nursing Note:  Vikas Ji presents today for fluids and zofran.    Patient seen by provider today: No   present during visit today: Not Applicable.    Note: N/A.    Intravenous Access:  Peripheral IV placed to left forearm with brisk blood return.    Treatment Conditions:  Not Applicable.      Post Infusion Assessment:  Patient tolerated infusion without incident.  Blood return noted pre and post infusion.  Site patent and intact, free from redness, edema or discomfort.  No evidence of extravasations.  Access discontinued per protocol.   Patient was able to drink apple juice and coffee along with having some shortbread cookies.        Discharge Plan:   Patient and/or family verbalized understanding of discharge instructions and all questions answered.  Copy of AVS reviewed with patient and/or family.  Patient will return as needed with ACP for next appointment.  Departure Mode: Ambulatory with spouse.    Josie Ndiaye RN

## 2020-09-29 ENCOUNTER — TELEPHONE (OUTPATIENT)
Dept: ONCOLOGY | Facility: OTHER | Age: 80
End: 2020-09-29

## 2020-09-29 DIAGNOSIS — C15.5 MALIGNANT NEOPLASM OF LOWER THIRD OF ESOPHAGUS (H): Primary | ICD-10-CM

## 2020-09-29 DIAGNOSIS — C15.9 ADENOCARCINOMA OF ESOPHAGUS (H): ICD-10-CM

## 2020-09-29 DIAGNOSIS — E87.1 HYPONATREMIA: ICD-10-CM

## 2020-09-29 LAB
ALBUMIN SERPL-MCNC: 4.4 G/DL (ref 3.5–5.7)
ALP SERPL-CCNC: 51 U/L (ref 34–104)
ALT SERPL W P-5'-P-CCNC: 19 U/L (ref 7–52)
ANION GAP SERPL CALCULATED.3IONS-SCNC: 7 MMOL/L (ref 3–14)
AST SERPL W P-5'-P-CCNC: 18 U/L (ref 13–39)
BASOPHILS # BLD AUTO: 0 10E9/L (ref 0–0.2)
BASOPHILS NFR BLD AUTO: 0.8 %
BILIRUB SERPL-MCNC: 0.7 MG/DL (ref 0.3–1)
BUN SERPL-MCNC: 12 MG/DL (ref 7–25)
CALCIUM SERPL-MCNC: 9.7 MG/DL (ref 8.6–10.3)
CHLORIDE SERPL-SCNC: 95 MMOL/L (ref 98–107)
CO2 SERPL-SCNC: 27 MMOL/L (ref 21–31)
CREAT SERPL-MCNC: 0.85 MG/DL (ref 0.7–1.3)
DIFFERENTIAL METHOD BLD: ABNORMAL
EOSINOPHIL # BLD AUTO: 0.2 10E9/L (ref 0–0.7)
EOSINOPHIL NFR BLD AUTO: 2.8 %
ERYTHROCYTE [DISTWIDTH] IN BLOOD BY AUTOMATED COUNT: 11.8 % (ref 10–15)
GFR SERPL CREATININE-BSD FRML MDRD: 87 ML/MIN/{1.73_M2}
GLUCOSE SERPL-MCNC: 297 MG/DL (ref 70–105)
HCT VFR BLD AUTO: 38.2 % (ref 40–53)
HGB BLD-MCNC: 13.5 G/DL (ref 13.3–17.7)
IMM GRANULOCYTES # BLD: 0 10E9/L (ref 0–0.4)
IMM GRANULOCYTES NFR BLD: 0.4 %
LDH SERPL L TO P-CCNC: 117 U/L (ref 140–271)
LYMPHOCYTES # BLD AUTO: 0.4 10E9/L (ref 0.8–5.3)
LYMPHOCYTES NFR BLD AUTO: 8.3 %
MCH RBC QN AUTO: 31.3 PG (ref 26.5–33)
MCHC RBC AUTO-ENTMCNC: 35.3 G/DL (ref 31.5–36.5)
MCV RBC AUTO: 88 FL (ref 78–100)
MONOCYTES # BLD AUTO: 0.5 10E9/L (ref 0–1.3)
MONOCYTES NFR BLD AUTO: 9.6 %
NEUTROPHILS # BLD AUTO: 4.1 10E9/L (ref 1.6–8.3)
NEUTROPHILS NFR BLD AUTO: 78.1 %
PLATELET # BLD AUTO: 208 10E9/L (ref 150–450)
POTASSIUM SERPL-SCNC: 4.5 MMOL/L (ref 3.5–5.1)
PROT SERPL-MCNC: 7.2 G/DL (ref 6.4–8.9)
RBC # BLD AUTO: 4.32 10E12/L (ref 4.4–5.9)
SODIUM SERPL-SCNC: 129 MMOL/L (ref 134–144)
WBC # BLD AUTO: 5.3 10E9/L (ref 4–11)

## 2020-09-29 PROCEDURE — 83615 LACTATE (LD) (LDH) ENZYME: CPT | Mod: ZL | Performed by: INTERNAL MEDICINE

## 2020-09-29 PROCEDURE — 80053 COMPREHEN METABOLIC PANEL: CPT | Mod: ZL | Performed by: INTERNAL MEDICINE

## 2020-09-29 PROCEDURE — 85025 COMPLETE CBC W/AUTO DIFF WBC: CPT | Mod: ZL | Performed by: INTERNAL MEDICINE

## 2020-09-29 PROCEDURE — 36415 COLL VENOUS BLD VENIPUNCTURE: CPT | Mod: ZL | Performed by: INTERNAL MEDICINE

## 2020-09-29 NOTE — TELEPHONE ENCOUNTER
Patient taking in little fluids, not eating much. Patient can come in this week for fluids. Daughter Nirmala will make an appointment to have patient come in this week.

## 2020-09-29 NOTE — TELEPHONE ENCOUNTER
Peer to peer review done with Dr. Awan and PET scan is denied. Per Christine Rodriguez MD will change order to Ct chest/abdomen/pelvis in November. Daughter Nirmala notified.  Tracie Borrego RN...........9/29/2020 3:54 PM

## 2020-09-29 NOTE — TELEPHONE ENCOUNTER
Sodium still low. Would he benefit from further fluid replacement? To Erin Francois NP to address.  Tracie Borrego RN...........9/29/2020 3:09 PM

## 2020-09-30 ENCOUNTER — HOSPITAL ENCOUNTER (OUTPATIENT)
Dept: INFUSION THERAPY | Facility: OTHER | Age: 80
Discharge: HOME | End: 2020-09-30
Attending: INTERNAL MEDICINE | Admitting: INTERNAL MEDICINE
Payer: COMMERCIAL

## 2020-09-30 VITALS
HEART RATE: 80 BPM | SYSTOLIC BLOOD PRESSURE: 125 MMHG | TEMPERATURE: 96.7 F | DIASTOLIC BLOOD PRESSURE: 64 MMHG | RESPIRATION RATE: 18 BRPM

## 2020-09-30 DIAGNOSIS — E86.0 DEHYDRATION: Primary | ICD-10-CM

## 2020-09-30 DIAGNOSIS — E87.1 HYPONATREMIA: Primary | ICD-10-CM

## 2020-09-30 DIAGNOSIS — C15.5 MALIGNANT NEOPLASM OF LOWER THIRD OF ESOPHAGUS (H): ICD-10-CM

## 2020-09-30 DIAGNOSIS — E87.1 HYPONATREMIA: ICD-10-CM

## 2020-09-30 PROCEDURE — 25800030 ZZH RX IP 258 OP 636: Performed by: INTERNAL MEDICINE

## 2020-09-30 PROCEDURE — 96374 THER/PROPH/DIAG INJ IV PUSH: CPT

## 2020-09-30 PROCEDURE — 25000128 H RX IP 250 OP 636: Performed by: INTERNAL MEDICINE

## 2020-09-30 PROCEDURE — 96361 HYDRATE IV INFUSION ADD-ON: CPT

## 2020-09-30 RX ADMIN — SODIUM CHLORIDE 8 MG: 900 INJECTION, SOLUTION INTRAVENOUS at 13:30

## 2020-09-30 RX ADMIN — SODIUM CHLORIDE 1000 ML: 9 INJECTION, SOLUTION INTRAVENOUS at 13:27

## 2020-09-30 NOTE — PROGRESS NOTES
Labs every 2 weeks if family feels needed. Patient can have PRN fluids for sodium less than 125, or if Miles is not eating/drinking well per Christine Borrego RN...........9/30/2020 3:19 PM

## 2020-09-30 NOTE — PROGRESS NOTES
Infusion Nursing Note:  Vikas Ji presents today for IV fluids and zofran.    Patient seen by provider today: No   present during visit today: Not Applicable.    Note: N/A.    Intravenous Access:  Peripheral IV placed.    Treatment Conditions:  Not Applicable.      Post Infusion Assessment:  Patient tolerated infusion without incident.  Blood return noted pre and post infusion.  Site patent and intact, free from redness, edema or discomfort.  No evidence of extravasations.  Access discontinued per protocol.       Discharge Plan:   Discharge instructions reviewed with: Patient.  Copy of AVS reviewed with patient and/or family.  Patient will return as needed for next appointment.  Patient discharged in stable condition accompanied by: wife.  Departure Mode: Ambulatory.    Cassie Hayes RN

## 2020-11-06 ENCOUNTER — HOSPITAL ENCOUNTER (OUTPATIENT)
Dept: CT IMAGING | Facility: OTHER | Age: 80
End: 2020-11-06
Attending: INTERNAL MEDICINE
Payer: COMMERCIAL

## 2020-11-06 DIAGNOSIS — C15.5 MALIGNANT NEOPLASM OF LOWER THIRD OF ESOPHAGUS (H): ICD-10-CM

## 2020-11-06 DIAGNOSIS — E87.1 HYPONATREMIA: ICD-10-CM

## 2020-11-06 LAB
ALBUMIN SERPL-MCNC: 4.4 G/DL (ref 3.5–5.7)
ALP SERPL-CCNC: 47 U/L (ref 34–104)
ALT SERPL W P-5'-P-CCNC: 14 U/L (ref 7–52)
ANION GAP SERPL CALCULATED.3IONS-SCNC: 9 MMOL/L (ref 3–14)
AST SERPL W P-5'-P-CCNC: 14 U/L (ref 13–39)
BASOPHILS # BLD AUTO: 0 10E9/L (ref 0–0.2)
BASOPHILS NFR BLD AUTO: 0.6 %
BILIRUB SERPL-MCNC: 0.8 MG/DL (ref 0.3–1)
BUN SERPL-MCNC: 15 MG/DL (ref 7–25)
CALCIUM SERPL-MCNC: 9.7 MG/DL (ref 8.6–10.3)
CHLORIDE SERPL-SCNC: 96 MMOL/L (ref 98–107)
CO2 SERPL-SCNC: 26 MMOL/L (ref 21–31)
CREAT SERPL-MCNC: 0.78 MG/DL (ref 0.7–1.3)
DIFFERENTIAL METHOD BLD: ABNORMAL
EOSINOPHIL # BLD AUTO: 0.1 10E9/L (ref 0–0.7)
EOSINOPHIL NFR BLD AUTO: 1.7 %
ERYTHROCYTE [DISTWIDTH] IN BLOOD BY AUTOMATED COUNT: 12.5 % (ref 10–15)
GFR SERPL CREATININE-BSD FRML MDRD: >90 ML/MIN/{1.73_M2}
GLUCOSE SERPL-MCNC: 239 MG/DL (ref 70–105)
HCT VFR BLD AUTO: 37.7 % (ref 40–53)
HGB BLD-MCNC: 13.2 G/DL (ref 13.3–17.7)
IMM GRANULOCYTES # BLD: 0 10E9/L (ref 0–0.4)
IMM GRANULOCYTES NFR BLD: 0.3 %
LDH SERPL L TO P-CCNC: 96 U/L (ref 140–271)
LYMPHOCYTES # BLD AUTO: 0.5 10E9/L (ref 0.8–5.3)
LYMPHOCYTES NFR BLD AUTO: 7.6 %
MCH RBC QN AUTO: 31.9 PG (ref 26.5–33)
MCHC RBC AUTO-ENTMCNC: 35 G/DL (ref 31.5–36.5)
MCV RBC AUTO: 91 FL (ref 78–100)
MONOCYTES # BLD AUTO: 0.5 10E9/L (ref 0–1.3)
MONOCYTES NFR BLD AUTO: 7.6 %
NEUTROPHILS # BLD AUTO: 5.2 10E9/L (ref 1.6–8.3)
NEUTROPHILS NFR BLD AUTO: 82.2 %
PLATELET # BLD AUTO: 162 10E9/L (ref 150–450)
POTASSIUM SERPL-SCNC: 4.5 MMOL/L (ref 3.5–5.1)
PROT SERPL-MCNC: 7 G/DL (ref 6.4–8.9)
RBC # BLD AUTO: 4.14 10E12/L (ref 4.4–5.9)
SODIUM SERPL-SCNC: 131 MMOL/L (ref 134–144)
WBC # BLD AUTO: 6.3 10E9/L (ref 4–11)

## 2020-11-06 PROCEDURE — 80053 COMPREHEN METABOLIC PANEL: CPT | Mod: ZL | Performed by: INTERNAL MEDICINE

## 2020-11-06 PROCEDURE — 85025 COMPLETE CBC W/AUTO DIFF WBC: CPT | Mod: ZL | Performed by: INTERNAL MEDICINE

## 2020-11-06 PROCEDURE — 74177 CT ABD & PELVIS W/CONTRAST: CPT

## 2020-11-06 PROCEDURE — 36415 COLL VENOUS BLD VENIPUNCTURE: CPT | Mod: ZL | Performed by: INTERNAL MEDICINE

## 2020-11-06 PROCEDURE — 71260 CT THORAX DX C+: CPT

## 2020-11-06 PROCEDURE — 255N000002 HC RX 255 OP 636: Performed by: INTERNAL MEDICINE

## 2020-11-06 PROCEDURE — 83615 LACTATE (LD) (LDH) ENZYME: CPT | Mod: ZL | Performed by: INTERNAL MEDICINE

## 2020-11-06 RX ADMIN — IOHEXOL 100 ML: 350 INJECTION, SOLUTION INTRAVENOUS at 12:23

## 2021-01-01 ENCOUNTER — OFFICE VISIT (OUTPATIENT)
Dept: INTERNAL MEDICINE | Facility: OTHER | Age: 81
End: 2021-01-01
Attending: INTERNAL MEDICINE
Payer: COMMERCIAL

## 2021-01-01 ENCOUNTER — NURSING HOME VISIT (OUTPATIENT)
Dept: GERIATRICS | Facility: OTHER | Age: 81
End: 2021-01-01
Attending: NURSE PRACTITIONER
Payer: COMMERCIAL

## 2021-01-01 ENCOUNTER — APPOINTMENT (OUTPATIENT)
Dept: GENERAL RADIOLOGY | Facility: OTHER | Age: 81
DRG: 865 | End: 2021-01-01
Attending: FAMILY MEDICINE
Payer: COMMERCIAL

## 2021-01-01 ENCOUNTER — APPOINTMENT (OUTPATIENT)
Dept: MRI IMAGING | Facility: OTHER | Age: 81
DRG: 865 | End: 2021-01-01
Attending: FAMILY MEDICINE
Payer: COMMERCIAL

## 2021-01-01 ENCOUNTER — LAB (OUTPATIENT)
Dept: LAB | Facility: OTHER | Age: 81
End: 2021-01-01
Attending: INTERNAL MEDICINE
Payer: COMMERCIAL

## 2021-01-01 ENCOUNTER — APPOINTMENT (OUTPATIENT)
Dept: PHYSICAL THERAPY | Facility: OTHER | Age: 81
DRG: 865 | End: 2021-01-01
Payer: COMMERCIAL

## 2021-01-01 ENCOUNTER — TELEPHONE (OUTPATIENT)
Dept: INTERNAL MEDICINE | Facility: OTHER | Age: 81
End: 2021-01-01

## 2021-01-01 ENCOUNTER — OFFICE VISIT (OUTPATIENT)
Dept: FAMILY MEDICINE | Facility: OTHER | Age: 81
End: 2021-01-01
Attending: PHYSICIAN ASSISTANT
Payer: COMMERCIAL

## 2021-01-01 ENCOUNTER — TELEPHONE (OUTPATIENT)
Dept: FAMILY MEDICINE | Facility: OTHER | Age: 81
End: 2021-01-01

## 2021-01-01 ENCOUNTER — TELEPHONE (OUTPATIENT)
Dept: INTERNAL MEDICINE | Facility: OTHER | Age: 81
End: 2021-01-01
Payer: COMMERCIAL

## 2021-01-01 ENCOUNTER — APPOINTMENT (OUTPATIENT)
Dept: OCCUPATIONAL THERAPY | Facility: OTHER | Age: 81
DRG: 865 | End: 2021-01-01
Attending: FAMILY MEDICINE
Payer: COMMERCIAL

## 2021-01-01 ENCOUNTER — PATIENT OUTREACH (OUTPATIENT)
Dept: CARE COORDINATION | Facility: CLINIC | Age: 81
End: 2021-01-01

## 2021-01-01 ENCOUNTER — APPOINTMENT (OUTPATIENT)
Dept: CT IMAGING | Facility: OTHER | Age: 81
DRG: 865 | End: 2021-01-01
Attending: EMERGENCY MEDICINE
Payer: COMMERCIAL

## 2021-01-01 ENCOUNTER — APPOINTMENT (OUTPATIENT)
Dept: SPEECH THERAPY | Facility: OTHER | Age: 81
DRG: 865 | End: 2021-01-01
Payer: COMMERCIAL

## 2021-01-01 ENCOUNTER — APPOINTMENT (OUTPATIENT)
Dept: OCCUPATIONAL THERAPY | Facility: OTHER | Age: 81
DRG: 865 | End: 2021-01-01
Payer: COMMERCIAL

## 2021-01-01 ENCOUNTER — MEDICAL CORRESPONDENCE (OUTPATIENT)
Dept: HEALTH INFORMATION MANAGEMENT | Facility: OTHER | Age: 81
End: 2021-01-01
Payer: COMMERCIAL

## 2021-01-01 ENCOUNTER — HOSPITAL ENCOUNTER (INPATIENT)
Facility: OTHER | Age: 81
LOS: 4 days | Discharge: HOME | DRG: 865 | End: 2021-07-02
Attending: EMERGENCY MEDICINE | Admitting: INTERNAL MEDICINE
Payer: COMMERCIAL

## 2021-01-01 ENCOUNTER — IMMUNIZATION (OUTPATIENT)
Dept: FAMILY MEDICINE | Facility: OTHER | Age: 81
End: 2021-01-01
Attending: INTERNAL MEDICINE
Payer: COMMERCIAL

## 2021-01-01 ENCOUNTER — DOCUMENTATION ONLY (OUTPATIENT)
Dept: OTHER | Facility: CLINIC | Age: 81
End: 2021-01-01

## 2021-01-01 ENCOUNTER — APPOINTMENT (OUTPATIENT)
Dept: GENERAL RADIOLOGY | Facility: OTHER | Age: 81
DRG: 865 | End: 2021-01-01
Attending: EMERGENCY MEDICINE
Payer: COMMERCIAL

## 2021-01-01 VITALS
SYSTOLIC BLOOD PRESSURE: 108 MMHG | OXYGEN SATURATION: 98 % | BODY MASS INDEX: 22.61 KG/M2 | DIASTOLIC BLOOD PRESSURE: 54 MMHG | HEART RATE: 94 BPM | RESPIRATION RATE: 18 BRPM | WEIGHT: 147.6 LBS

## 2021-01-01 VITALS
SYSTOLIC BLOOD PRESSURE: 136 MMHG | HEART RATE: 90 BPM | OXYGEN SATURATION: 97 % | RESPIRATION RATE: 15 BRPM | TEMPERATURE: 97.5 F | DIASTOLIC BLOOD PRESSURE: 73 MMHG

## 2021-01-01 VITALS
DIASTOLIC BLOOD PRESSURE: 70 MMHG | RESPIRATION RATE: 20 BRPM | HEART RATE: 94 BPM | TEMPERATURE: 96.8 F | SYSTOLIC BLOOD PRESSURE: 131 MMHG | OXYGEN SATURATION: 98 %

## 2021-01-01 VITALS
HEART RATE: 87 BPM | WEIGHT: 148.6 LBS | DIASTOLIC BLOOD PRESSURE: 61 MMHG | BODY MASS INDEX: 22.76 KG/M2 | SYSTOLIC BLOOD PRESSURE: 122 MMHG | RESPIRATION RATE: 18 BRPM | TEMPERATURE: 98.4 F | OXYGEN SATURATION: 96 %

## 2021-01-01 VITALS
WEIGHT: 155.4 LBS | HEIGHT: 68 IN | RESPIRATION RATE: 12 BRPM | TEMPERATURE: 96.8 F | BODY MASS INDEX: 23.55 KG/M2 | SYSTOLIC BLOOD PRESSURE: 124 MMHG | OXYGEN SATURATION: 99 % | HEART RATE: 93 BPM | DIASTOLIC BLOOD PRESSURE: 56 MMHG

## 2021-01-01 VITALS
WEIGHT: 142 LBS | BODY MASS INDEX: 21.75 KG/M2 | DIASTOLIC BLOOD PRESSURE: 71 MMHG | OXYGEN SATURATION: 98 % | RESPIRATION RATE: 16 BRPM | HEART RATE: 60 BPM | TEMPERATURE: 96.8 F | SYSTOLIC BLOOD PRESSURE: 121 MMHG

## 2021-01-01 DIAGNOSIS — I10 BENIGN ESSENTIAL HYPERTENSION: ICD-10-CM

## 2021-01-01 DIAGNOSIS — Z72.0 CHEWING TOBACCO USE: ICD-10-CM

## 2021-01-01 DIAGNOSIS — E11.65 UNCONTROLLED TYPE 2 DIABETES MELLITUS WITH HYPERGLYCEMIA (H): ICD-10-CM

## 2021-01-01 DIAGNOSIS — Z91.89 AT HIGH RISK FOR ELOPEMENT: ICD-10-CM

## 2021-01-01 DIAGNOSIS — Z79.899 ENCOUNTER FOR MEDICATION REVIEW: ICD-10-CM

## 2021-01-01 DIAGNOSIS — Z92.3 HISTORY OF RADIATION THERAPY: ICD-10-CM

## 2021-01-01 DIAGNOSIS — E11.9 CONTROLLED TYPE 2 DIABETES MELLITUS WITHOUT COMPLICATION, WITH LONG-TERM CURRENT USE OF INSULIN (H): Primary | ICD-10-CM

## 2021-01-01 DIAGNOSIS — R45.1 AGITATION: ICD-10-CM

## 2021-01-01 DIAGNOSIS — R45.1 AGITATION: Primary | ICD-10-CM

## 2021-01-01 DIAGNOSIS — E11.9 CONTROLLED TYPE 2 DIABETES MELLITUS WITHOUT COMPLICATION, WITH LONG-TERM CURRENT USE OF INSULIN (H): ICD-10-CM

## 2021-01-01 DIAGNOSIS — F02.80 ALZHEIMER'S DEMENTIA OF OTHER ONSET WITHOUT BEHAVIORAL DISTURBANCE: Primary | ICD-10-CM

## 2021-01-01 DIAGNOSIS — R73.9 HYPERGLYCEMIA: ICD-10-CM

## 2021-01-01 DIAGNOSIS — E11.9 DM TYPE 2, NOT AT GOAL (H): ICD-10-CM

## 2021-01-01 DIAGNOSIS — B88.2 TICK-BORNE DISEASE: ICD-10-CM

## 2021-01-01 DIAGNOSIS — G30.8 ALZHEIMER'S DEMENTIA OF OTHER ONSET WITH BEHAVIORAL DISTURBANCE: Primary | ICD-10-CM

## 2021-01-01 DIAGNOSIS — E78.5 HYPERLIPIDEMIA, UNSPECIFIED HYPERLIPIDEMIA TYPE: ICD-10-CM

## 2021-01-01 DIAGNOSIS — R50.9 FEVER, UNSPECIFIED FEVER CAUSE: ICD-10-CM

## 2021-01-01 DIAGNOSIS — Z79.899 NEED FOR PROPHYLACTIC CHEMOTHERAPY: ICD-10-CM

## 2021-01-01 DIAGNOSIS — Z87.891 PERSONAL HISTORY OF TOBACCO USE, PRESENTING HAZARDS TO HEALTH: ICD-10-CM

## 2021-01-01 DIAGNOSIS — F02.818 ALZHEIMER'S DEMENTIA OF OTHER ONSET WITH BEHAVIORAL DISTURBANCE: Primary | ICD-10-CM

## 2021-01-01 DIAGNOSIS — Z66 DNR (DO NOT RESUSCITATE): ICD-10-CM

## 2021-01-01 DIAGNOSIS — R63.4 WEIGHT LOSS: Primary | ICD-10-CM

## 2021-01-01 DIAGNOSIS — R41.0 CONFUSION: Primary | ICD-10-CM

## 2021-01-01 DIAGNOSIS — E87.1 HYPONATREMIA: ICD-10-CM

## 2021-01-01 DIAGNOSIS — R41.0 CONFUSION: ICD-10-CM

## 2021-01-01 DIAGNOSIS — C15.9 ADENOCARCINOMA OF ESOPHAGUS (H): ICD-10-CM

## 2021-01-01 DIAGNOSIS — K51.90 ULCERATIVE COLITIS WITHOUT COMPLICATIONS, UNSPECIFIED LOCATION (H): ICD-10-CM

## 2021-01-01 DIAGNOSIS — E83.52 HYPERCALCEMIA: ICD-10-CM

## 2021-01-01 DIAGNOSIS — M15.0 PRIMARY OSTEOARTHRITIS INVOLVING MULTIPLE JOINTS: ICD-10-CM

## 2021-01-01 DIAGNOSIS — R63.4 WEIGHT LOSS: ICD-10-CM

## 2021-01-01 DIAGNOSIS — R11.0 NAUSEA: ICD-10-CM

## 2021-01-01 DIAGNOSIS — F03.90 SENILE DEMENTIA, UNCOMPLICATED (H): ICD-10-CM

## 2021-01-01 DIAGNOSIS — D64.9 ANEMIA, UNSPECIFIED TYPE: ICD-10-CM

## 2021-01-01 DIAGNOSIS — E83.42 HYPOMAGNESEMIA: ICD-10-CM

## 2021-01-01 DIAGNOSIS — Z79.4 CONTROLLED TYPE 2 DIABETES MELLITUS WITHOUT COMPLICATION, WITH LONG-TERM CURRENT USE OF INSULIN (H): ICD-10-CM

## 2021-01-01 DIAGNOSIS — Z79.4 CONTROLLED TYPE 2 DIABETES MELLITUS WITHOUT COMPLICATION, WITH LONG-TERM CURRENT USE OF INSULIN (H): Primary | ICD-10-CM

## 2021-01-01 DIAGNOSIS — E11.9 TYPE 2 DIABETES MELLITUS WITHOUT COMPLICATION, UNSPECIFIED WHETHER LONG TERM INSULIN USE (H): ICD-10-CM

## 2021-01-01 DIAGNOSIS — G30.8 OTHER ALZHEIMER'S DISEASE (CODE) (H): Primary | ICD-10-CM

## 2021-01-01 DIAGNOSIS — Z85.819 HISTORY OF THROAT CANCER: ICD-10-CM

## 2021-01-01 DIAGNOSIS — Z79.82 ENCOUNTER FOR LONG-TERM (CURRENT) USE OF ASPIRIN: ICD-10-CM

## 2021-01-01 DIAGNOSIS — F41.1 GAD (GENERALIZED ANXIETY DISORDER): ICD-10-CM

## 2021-01-01 DIAGNOSIS — Z23 NEED FOR PROPHYLACTIC VACCINATION AND INOCULATION AGAINST INFLUENZA: Primary | ICD-10-CM

## 2021-01-01 DIAGNOSIS — I10 ESSENTIAL HYPERTENSION, MALIGNANT: ICD-10-CM

## 2021-01-01 DIAGNOSIS — Z11.52 ENCOUNTER FOR SCREENING LABORATORY TESTING FOR COVID-19 VIRUS: ICD-10-CM

## 2021-01-01 DIAGNOSIS — G30.8 ALZHEIMER'S DEMENTIA OF OTHER ONSET WITHOUT BEHAVIORAL DISTURBANCE: Primary | ICD-10-CM

## 2021-01-01 DIAGNOSIS — E11.65 UNCONTROLLED TYPE 2 DIABETES MELLITUS WITH HYPERGLYCEMIA (H): Primary | ICD-10-CM

## 2021-01-01 LAB
A PHAGOCYTOPH DNA BLD QL NAA+PROBE: NOT DETECTED
ALBUMIN SERPL-MCNC: 3.6 G/DL (ref 3.5–5.7)
ALBUMIN SERPL-MCNC: 4.1 G/DL (ref 3.5–5.7)
ALBUMIN SERPL-MCNC: 4.4 G/DL (ref 3.5–5.7)
ALBUMIN SERPL-MCNC: 4.6 G/DL (ref 3.5–5.7)
ALBUMIN UR-MCNC: NEGATIVE MG/DL
ALBUMIN UR-MCNC: NEGATIVE MG/DL
ALP SERPL-CCNC: 35 U/L (ref 34–104)
ALP SERPL-CCNC: 47 U/L (ref 34–104)
ALP SERPL-CCNC: 49 U/L (ref 34–104)
ALP SERPL-CCNC: 51 U/L (ref 34–104)
ALT SERPL W P-5'-P-CCNC: 10 U/L (ref 7–52)
ALT SERPL W P-5'-P-CCNC: 22 U/L (ref 7–52)
ALT SERPL W P-5'-P-CCNC: 39 U/L (ref 7–52)
ALT SERPL W P-5'-P-CCNC: 9 U/L (ref 7–52)
ANION GAP SERPL CALCULATED.3IONS-SCNC: 12 MMOL/L (ref 3–14)
ANION GAP SERPL CALCULATED.3IONS-SCNC: 5 MMOL/L (ref 3–14)
ANION GAP SERPL CALCULATED.3IONS-SCNC: 8 MMOL/L (ref 3–14)
ANION GAP SERPL CALCULATED.3IONS-SCNC: 9 MMOL/L (ref 3–14)
APPEARANCE UR: CLEAR
APPEARANCE UR: CLEAR
APTT PPP: 28 SEC (ref 22–37)
AST SERPL W P-5'-P-CCNC: 14 U/L (ref 13–39)
AST SERPL W P-5'-P-CCNC: 16 U/L (ref 13–39)
AST SERPL W P-5'-P-CCNC: 22 U/L (ref 13–39)
AST SERPL W P-5'-P-CCNC: 31 U/L (ref 13–39)
B BURGDOR IGG+IGM SER QL: 0.08 (ref 0–0.89)
BACTERIA SPEC CULT: NO GROWTH
BACTERIA SPEC CULT: NORMAL
BACTERIA SPEC CULT: NORMAL
BASOPHILS # BLD AUTO: 0 10E3/UL (ref 0–0.2)
BASOPHILS # BLD AUTO: 0 10E9/L (ref 0–0.2)
BASOPHILS NFR BLD AUTO: 0 %
BASOPHILS NFR BLD AUTO: 0.1 %
BILIRUB SERPL-MCNC: 0.7 MG/DL (ref 0.3–1)
BILIRUB SERPL-MCNC: 0.7 MG/DL (ref 0.3–1)
BILIRUB SERPL-MCNC: 0.8 MG/DL (ref 0.3–1)
BILIRUB SERPL-MCNC: 1.3 MG/DL (ref 0.3–1)
BILIRUB UR QL STRIP: NEGATIVE
BILIRUB UR QL STRIP: NEGATIVE
BUN SERPL-MCNC: 15 MG/DL (ref 7–25)
BUN SERPL-MCNC: 18 MG/DL (ref 7–25)
BUN SERPL-MCNC: 20 MG/DL (ref 7–25)
BUN SERPL-MCNC: 27 MG/DL (ref 7–25)
BUN SERPL-MCNC: 8 MG/DL (ref 7–25)
BUN SERPL-MCNC: 9 MG/DL (ref 7–25)
CALCIUM SERPL-MCNC: 10.4 MG/DL (ref 8.6–10.3)
CALCIUM SERPL-MCNC: 8.1 MG/DL (ref 8.6–10.3)
CALCIUM SERPL-MCNC: 8.2 MG/DL (ref 8.6–10.3)
CALCIUM SERPL-MCNC: 8.3 MG/DL (ref 8.6–10.3)
CALCIUM SERPL-MCNC: 9.2 MG/DL (ref 8.6–10.3)
CALCIUM SERPL-MCNC: 9.4 MG/DL (ref 8.6–10.3)
CHLORIDE BLD-SCNC: 97 MMOL/L (ref 98–107)
CHLORIDE SERPL-SCNC: 100 MMOL/L (ref 98–107)
CHLORIDE SERPL-SCNC: 104 MMOL/L (ref 98–107)
CHLORIDE SERPL-SCNC: 106 MMOL/L (ref 98–107)
CHLORIDE SERPL-SCNC: 107 MMOL/L (ref 98–107)
CHLORIDE SERPL-SCNC: 97 MMOL/L (ref 98–107)
CO2 SERPL-SCNC: 20 MMOL/L (ref 21–31)
CO2 SERPL-SCNC: 21 MMOL/L (ref 21–31)
CO2 SERPL-SCNC: 21 MMOL/L (ref 21–31)
CO2 SERPL-SCNC: 22 MMOL/L (ref 21–31)
CO2 SERPL-SCNC: 26 MMOL/L (ref 21–31)
CO2 SERPL-SCNC: 30 MMOL/L (ref 21–31)
COLOR UR AUTO: ABNORMAL
COLOR UR AUTO: YELLOW
CREAT SERPL-MCNC: 0.66 MG/DL (ref 0.7–1.3)
CREAT SERPL-MCNC: 0.69 MG/DL (ref 0.7–1.3)
CREAT SERPL-MCNC: 0.75 MG/DL (ref 0.7–1.3)
CREAT SERPL-MCNC: 0.81 MG/DL (ref 0.7–1.3)
CREAT SERPL-MCNC: 0.83 MG/DL (ref 0.7–1.3)
CREAT SERPL-MCNC: 0.9 MG/DL (ref 0.7–1.3)
CREAT UR-MCNC: <3 MG/DL
CRP SERPL-MCNC: 84 MG/L
CRP SERPL-MCNC: 90 MG/L
DIFFERENTIAL METHOD BLD: ABNORMAL
E CHAFFEENSIS DNA BLD QL NAA+PROBE: NOT DETECTED
E EWINGII DNA SPEC QL NAA+PROBE: NOT DETECTED
EHRLICHIA DNA SPEC QL NAA+PROBE: NOT DETECTED
EOSINOPHIL # BLD AUTO: 0 10E9/L (ref 0–0.7)
EOSINOPHIL # BLD AUTO: 0.2 10E3/UL (ref 0–0.7)
EOSINOPHIL NFR BLD AUTO: 0.2 %
EOSINOPHIL NFR BLD AUTO: 2 %
ERYTHROCYTE [DISTWIDTH] IN BLOOD BY AUTOMATED COUNT: 11.5 % (ref 10–15)
ERYTHROCYTE [DISTWIDTH] IN BLOOD BY AUTOMATED COUNT: 11.6 % (ref 10–15)
ERYTHROCYTE [DISTWIDTH] IN BLOOD BY AUTOMATED COUNT: 11.8 % (ref 10–15)
ERYTHROCYTE [DISTWIDTH] IN BLOOD BY AUTOMATED COUNT: 11.9 % (ref 10–15)
ERYTHROCYTE [DISTWIDTH] IN BLOOD BY AUTOMATED COUNT: 11.9 % (ref 10–15)
ERYTHROCYTE [DISTWIDTH] IN BLOOD BY AUTOMATED COUNT: 12.5 % (ref 10–15)
ERYTHROCYTE [SEDIMENTATION RATE] IN BLOOD BY WESTERGREN METHOD: 10 MM/H (ref 1–10)
GFR SERPL CREATININE-BSD FRML MDRD: 79 ML/MIN/1.73M2
GFR SERPL CREATININE-BSD FRML MDRD: 89 ML/MIN/{1.73_M2}
GFR SERPL CREATININE-BSD FRML MDRD: >90 ML/MIN/{1.73_M2}
GLUCOSE BLD-MCNC: 298 MG/DL (ref 70–105)
GLUCOSE SERPL-MCNC: 136 MG/DL (ref 70–105)
GLUCOSE SERPL-MCNC: 162 MG/DL (ref 70–105)
GLUCOSE SERPL-MCNC: 173 MG/DL (ref 70–105)
GLUCOSE SERPL-MCNC: 294 MG/DL (ref 70–105)
GLUCOSE SERPL-MCNC: 378 MG/DL (ref 70–105)
GLUCOSE UR STRIP-MCNC: >1000 MG/DL
GLUCOSE UR STRIP-MCNC: NEGATIVE MG/DL
HBA1C MFR BLD: 10.2 % (ref 4–6)
HBA1C MFR BLD: 8 % (ref 4–6.2)
HCO3 BLDV-SCNC: 22 MMOL/L (ref 21–28)
HCT VFR BLD AUTO: 33 % (ref 40–53)
HCT VFR BLD AUTO: 33.2 % (ref 40–53)
HCT VFR BLD AUTO: 33.4 % (ref 40–53)
HCT VFR BLD AUTO: 33.8 % (ref 40–53)
HCT VFR BLD AUTO: 36.7 % (ref 40–53)
HCT VFR BLD AUTO: 37 % (ref 40–53)
HGB BLD-MCNC: 11.5 G/DL (ref 13.3–17.7)
HGB BLD-MCNC: 11.7 G/DL (ref 13.3–17.7)
HGB BLD-MCNC: 11.8 G/DL (ref 13.3–17.7)
HGB BLD-MCNC: 12 G/DL (ref 13.3–17.7)
HGB BLD-MCNC: 12.5 G/DL (ref 13.3–17.7)
HGB BLD-MCNC: 13.1 G/DL (ref 13.3–17.7)
HGB UR QL STRIP: ABNORMAL
HGB UR QL STRIP: NEGATIVE
IMM GRANULOCYTES # BLD: 0 10E3/UL
IMM GRANULOCYTES # BLD: 0 10E9/L (ref 0–0.4)
IMM GRANULOCYTES NFR BLD: 0 %
IMM GRANULOCYTES NFR BLD: 0.2 %
INR PPP: 1.11 (ref 0.86–1.14)
INTERPRETATION ECG - MUSE: NORMAL
KETONES UR STRIP-MCNC: 40 MG/DL
KETONES UR STRIP-MCNC: NEGATIVE MG/DL
LABORATORY COMMENT REPORT: NORMAL
LACTATE BLD-SCNC: 0.9 MMOL/L (ref 0.7–2)
LACTATE BLD-SCNC: 1.7 MMOL/L (ref 0.7–2)
LEUKOCYTE ESTERASE UR QL STRIP: NEGATIVE
LEUKOCYTE ESTERASE UR QL STRIP: NEGATIVE
LYMPHOCYTES # BLD AUTO: 0.1 10E9/L (ref 0.8–5.3)
LYMPHOCYTES # BLD AUTO: 0.7 10E3/UL (ref 0.8–5.3)
LYMPHOCYTES NFR BLD AUTO: 1.6 %
LYMPHOCYTES NFR BLD AUTO: 9 %
MCH RBC QN AUTO: 31.3 PG (ref 26.5–33)
MCH RBC QN AUTO: 31.7 PG (ref 26.5–33)
MCH RBC QN AUTO: 31.8 PG (ref 26.5–33)
MCH RBC QN AUTO: 32.3 PG (ref 26.5–33)
MCHC RBC AUTO-ENTMCNC: 33.8 G/DL (ref 31.5–36.5)
MCHC RBC AUTO-ENTMCNC: 34.8 G/DL (ref 31.5–36.5)
MCHC RBC AUTO-ENTMCNC: 35.2 G/DL (ref 31.5–36.5)
MCHC RBC AUTO-ENTMCNC: 35.3 G/DL (ref 31.5–36.5)
MCHC RBC AUTO-ENTMCNC: 35.5 G/DL (ref 31.5–36.5)
MCHC RBC AUTO-ENTMCNC: 35.7 G/DL (ref 31.5–36.5)
MCV RBC AUTO: 90 FL (ref 78–100)
MCV RBC AUTO: 91 FL (ref 78–100)
MCV RBC AUTO: 91 FL (ref 78–100)
MCV RBC AUTO: 93 FL (ref 78–100)
MICROALBUMIN UR-MCNC: <5 MG/L
MICROALBUMIN/CREAT UR: NORMAL MG/G{CREAT}
MONOCYTES # BLD AUTO: 0.4 10E9/L (ref 0–1.3)
MONOCYTES # BLD AUTO: 0.6 10E3/UL (ref 0–1.3)
MONOCYTES NFR BLD AUTO: 4.9 %
MONOCYTES NFR BLD AUTO: 8 %
NEUTROPHILS # BLD AUTO: 5.8 10E3/UL (ref 1.6–8.3)
NEUTROPHILS # BLD AUTO: 8.1 10E9/L (ref 1.6–8.3)
NEUTROPHILS NFR BLD AUTO: 81 %
NEUTROPHILS NFR BLD AUTO: 93 %
NITRATE UR QL: NEGATIVE
NITRATE UR QL: NEGATIVE
NRBC # BLD AUTO: 0 10E3/UL
NRBC BLD AUTO-RTO: 0 /100
O2/TOTAL GAS SETTING VFR VENT: 0 %
OXYHGB MFR BLDV: 84 %
PCO2 BLDV: 35 MM HG (ref 40–50)
PH BLDV: 7.41 PH (ref 7.32–7.43)
PH UR STRIP: 6.5 PH (ref 5–7)
PH UR STRIP: 7.5 [PH] (ref 5–9)
PLATELET # BLD AUTO: 123 10E9/L (ref 150–450)
PLATELET # BLD AUTO: 128 10E9/L (ref 150–450)
PLATELET # BLD AUTO: 158 10E3/UL (ref 150–450)
PLATELET # BLD AUTO: 84 10E9/L (ref 150–450)
PLATELET # BLD AUTO: 91 10E9/L (ref 150–450)
PLATELET # BLD AUTO: 97 10E9/L (ref 150–450)
PO2 BLDV: 50 MM HG (ref 25–47)
POTASSIUM BLD-SCNC: 5 MMOL/L (ref 3.5–5.1)
POTASSIUM SERPL-SCNC: 3.4 MMOL/L (ref 3.5–5.1)
POTASSIUM SERPL-SCNC: 3.5 MMOL/L (ref 3.5–5.1)
POTASSIUM SERPL-SCNC: 3.7 MMOL/L (ref 3.5–5.1)
POTASSIUM SERPL-SCNC: 3.7 MMOL/L (ref 3.5–5.1)
POTASSIUM SERPL-SCNC: 3.9 MMOL/L (ref 3.5–5.1)
POTASSIUM SERPL-SCNC: 3.9 MMOL/L (ref 3.5–5.1)
POTASSIUM SERPL-SCNC: 4.3 MMOL/L (ref 3.5–5.1)
PROCALCITONIN SERPL-MCNC: 0.66 NG/ML
PROCALCITONIN SERPL-MCNC: <0.5 NG/ML
PROT SERPL-MCNC: 5.5 G/DL (ref 6.4–8.9)
PROT SERPL-MCNC: 6.3 G/DL (ref 6.4–8.9)
PROT SERPL-MCNC: 6.8 G/DL (ref 6.4–8.9)
PROT SERPL-MCNC: 7.2 G/DL (ref 6.4–8.9)
RBC # BLD AUTO: 3.63 10E12/L (ref 4.4–5.9)
RBC # BLD AUTO: 3.69 10E12/L (ref 4.4–5.9)
RBC # BLD AUTO: 3.72 10E12/L (ref 4.4–5.9)
RBC # BLD AUTO: 3.77 10E12/L (ref 4.4–5.9)
RBC # BLD AUTO: 4 10E6/UL (ref 4.4–5.9)
RBC # BLD AUTO: 4.05 10E12/L (ref 4.4–5.9)
RBC #/AREA URNS AUTO: 54 /HPF (ref 0–2)
SARS-COV-2 RNA RESP QL NAA+PROBE: NEGATIVE
SODIUM SERPL-SCNC: 131 MMOL/L (ref 134–144)
SODIUM SERPL-SCNC: 132 MMOL/L (ref 134–144)
SODIUM SERPL-SCNC: 133 MMOL/L (ref 134–144)
SODIUM SERPL-SCNC: 134 MMOL/L (ref 134–144)
SODIUM SERPL-SCNC: 135 MMOL/L (ref 134–144)
SODIUM SERPL-SCNC: 138 MMOL/L (ref 134–144)
SOURCE: ABNORMAL
SP GR UR STRIP: 1 (ref 1–1.03)
SP GR UR STRIP: 1.03 (ref 1–1.03)
SPECIMEN SOURCE: NORMAL
TSH SERPL DL<=0.005 MIU/L-ACNC: 1.27 MU/L (ref 0.4–4)
UROBILINOGEN UR STRIP-MCNC: NORMAL MG/DL
UROBILINOGEN UR STRIP-MCNC: NORMAL MG/DL (ref 0–2)
VIT B12 SERPL-MCNC: 617 PG/ML (ref 180–914)
WBC # BLD AUTO: 3 10E9/L (ref 4–11)
WBC # BLD AUTO: 3.4 10E9/L (ref 4–11)
WBC # BLD AUTO: 3.9 10E9/L (ref 4–11)
WBC # BLD AUTO: 5.9 10E9/L (ref 4–11)
WBC # BLD AUTO: 7.3 10E3/UL (ref 4–11)
WBC # BLD AUTO: 8.7 10E9/L (ref 4–11)
WBC #/AREA URNS AUTO: 3 /HPF (ref 0–5)

## 2021-01-01 PROCEDURE — 90662 IIV NO PRSV INCREASED AG IM: CPT

## 2021-01-01 PROCEDURE — G0008 ADMIN INFLUENZA VIRUS VAC: HCPCS

## 2021-01-01 PROCEDURE — 91300 PR COVID VAC PFIZER DIL RECON 30 MCG/0.3 ML IM: CPT

## 2021-01-01 PROCEDURE — 250N000013 HC RX MED GY IP 250 OP 250 PS 637: Performed by: FAMILY MEDICINE

## 2021-01-01 PROCEDURE — 99214 OFFICE O/P EST MOD 30 MIN: CPT | Performed by: PHYSICIAN ASSISTANT

## 2021-01-01 PROCEDURE — 80048 BASIC METABOLIC PNL TOTAL CA: CPT | Performed by: FAMILY MEDICINE

## 2021-01-01 PROCEDURE — 99231 SBSQ HOSP IP/OBS SF/LOW 25: CPT | Performed by: INTERNAL MEDICINE

## 2021-01-01 PROCEDURE — 84443 ASSAY THYROID STIM HORMONE: CPT | Mod: ZL | Performed by: INTERNAL MEDICINE

## 2021-01-01 PROCEDURE — G0378 HOSPITAL OBSERVATION PER HR: HCPCS

## 2021-01-01 PROCEDURE — 258N000003 HC RX IP 258 OP 636: Performed by: FAMILY MEDICINE

## 2021-01-01 PROCEDURE — 36415 COLL VENOUS BLD VENIPUNCTURE: CPT | Performed by: INTERNAL MEDICINE

## 2021-01-01 PROCEDURE — 84132 ASSAY OF SERUM POTASSIUM: CPT | Performed by: INTERNAL MEDICINE

## 2021-01-01 PROCEDURE — 85027 COMPLETE CBC AUTOMATED: CPT | Performed by: INTERNAL MEDICINE

## 2021-01-01 PROCEDURE — 85730 THROMBOPLASTIN TIME PARTIAL: CPT | Performed by: EMERGENCY MEDICINE

## 2021-01-01 PROCEDURE — 36415 COLL VENOUS BLD VENIPUNCTURE: CPT | Performed by: FAMILY MEDICINE

## 2021-01-01 PROCEDURE — 83036 HEMOGLOBIN GLYCOSYLATED A1C: CPT | Mod: ZL | Performed by: PHYSICIAN ASSISTANT

## 2021-01-01 PROCEDURE — 250N000013 HC RX MED GY IP 250 OP 250 PS 637: Performed by: EMERGENCY MEDICINE

## 2021-01-01 PROCEDURE — 80053 COMPREHEN METABOLIC PANEL: CPT | Performed by: INTERNAL MEDICINE

## 2021-01-01 PROCEDURE — 250N000013 HC RX MED GY IP 250 OP 250 PS 637: Performed by: INTERNAL MEDICINE

## 2021-01-01 PROCEDURE — 86618 LYME DISEASE ANTIBODY: CPT | Performed by: EMERGENCY MEDICINE

## 2021-01-01 PROCEDURE — 250N000009 HC RX 250: Performed by: FAMILY MEDICINE

## 2021-01-01 PROCEDURE — 250N000011 HC RX IP 250 OP 636: Performed by: EMERGENCY MEDICINE

## 2021-01-01 PROCEDURE — 70450 CT HEAD/BRAIN W/O DYE: CPT

## 2021-01-01 PROCEDURE — 97116 GAIT TRAINING THERAPY: CPT | Mod: GP

## 2021-01-01 PROCEDURE — 97535 SELF CARE MNGMENT TRAINING: CPT | Mod: GO | Performed by: OCCUPATIONAL THERAPIST

## 2021-01-01 PROCEDURE — 120N000001 HC R&B MED SURG/OB

## 2021-01-01 PROCEDURE — 99285 EMERGENCY DEPT VISIT HI MDM: CPT | Mod: 25 | Performed by: EMERGENCY MEDICINE

## 2021-01-01 PROCEDURE — 99285 EMERGENCY DEPT VISIT HI MDM: CPT | Performed by: EMERGENCY MEDICINE

## 2021-01-01 PROCEDURE — 84145 PROCALCITONIN (PCT): CPT | Performed by: EMERGENCY MEDICINE

## 2021-01-01 PROCEDURE — 81003 URINALYSIS AUTO W/O SCOPE: CPT | Mod: ZL

## 2021-01-01 PROCEDURE — 255N000002 HC RX 255 OP 636: Performed by: INTERNAL MEDICINE

## 2021-01-01 PROCEDURE — 81001 URINALYSIS AUTO W/SCOPE: CPT | Performed by: EMERGENCY MEDICINE

## 2021-01-01 PROCEDURE — 258N000003 HC RX IP 258 OP 636: Performed by: EMERGENCY MEDICINE

## 2021-01-01 PROCEDURE — 82043 UR ALBUMIN QUANTITATIVE: CPT | Mod: ZL

## 2021-01-01 PROCEDURE — 250N000011 HC RX IP 250 OP 636: Performed by: INTERNAL MEDICINE

## 2021-01-01 PROCEDURE — 80053 COMPREHEN METABOLIC PANEL: CPT | Mod: ZL | Performed by: PHYSICIAN ASSISTANT

## 2021-01-01 PROCEDURE — 84145 PROCALCITONIN (PCT): CPT | Performed by: INTERNAL MEDICINE

## 2021-01-01 PROCEDURE — 99232 SBSQ HOSP IP/OBS MODERATE 35: CPT | Performed by: INTERNAL MEDICINE

## 2021-01-01 PROCEDURE — 85652 RBC SED RATE AUTOMATED: CPT | Performed by: FAMILY MEDICINE

## 2021-01-01 PROCEDURE — 36415 COLL VENOUS BLD VENIPUNCTURE: CPT | Mod: ZL | Performed by: PHYSICIAN ASSISTANT

## 2021-01-01 PROCEDURE — 86140 C-REACTIVE PROTEIN: CPT | Performed by: FAMILY MEDICINE

## 2021-01-01 PROCEDURE — 87086 URINE CULTURE/COLONY COUNT: CPT | Performed by: FAMILY MEDICINE

## 2021-01-01 PROCEDURE — 97530 THERAPEUTIC ACTIVITIES: CPT | Mod: GP

## 2021-01-01 PROCEDURE — 97530 THERAPEUTIC ACTIVITIES: CPT | Mod: GO | Performed by: OCCUPATIONAL THERAPIST

## 2021-01-01 PROCEDURE — 96368 THER/DIAG CONCURRENT INF: CPT | Performed by: EMERGENCY MEDICINE

## 2021-01-01 PROCEDURE — 83605 ASSAY OF LACTIC ACID: CPT | Performed by: INTERNAL MEDICINE

## 2021-01-01 PROCEDURE — G0463 HOSPITAL OUTPT CLINIC VISIT: HCPCS

## 2021-01-01 PROCEDURE — 85025 COMPLETE CBC W/AUTO DIFF WBC: CPT | Performed by: EMERGENCY MEDICINE

## 2021-01-01 PROCEDURE — 36415 COLL VENOUS BLD VENIPUNCTURE: CPT | Mod: ZL | Performed by: INTERNAL MEDICINE

## 2021-01-01 PROCEDURE — 99214 OFFICE O/P EST MOD 30 MIN: CPT | Performed by: INTERNAL MEDICINE

## 2021-01-01 PROCEDURE — 99239 HOSP IP/OBS DSCHRG MGMT >30: CPT | Performed by: FAMILY MEDICINE

## 2021-01-01 PROCEDURE — 92526 ORAL FUNCTION THERAPY: CPT | Mod: GN

## 2021-01-01 PROCEDURE — 93010 ELECTROCARDIOGRAM REPORT: CPT | Performed by: INTERNAL MEDICINE

## 2021-01-01 PROCEDURE — C9803 HOPD COVID-19 SPEC COLLECT: HCPCS | Performed by: EMERGENCY MEDICINE

## 2021-01-01 PROCEDURE — G0180 MD CERTIFICATION HHA PATIENT: HCPCS | Performed by: INTERNAL MEDICINE

## 2021-01-01 PROCEDURE — 83605 ASSAY OF LACTIC ACID: CPT | Performed by: EMERGENCY MEDICINE

## 2021-01-01 PROCEDURE — 250N000011 HC RX IP 250 OP 636: Performed by: FAMILY MEDICINE

## 2021-01-01 PROCEDURE — 83036 HEMOGLOBIN GLYCOSYLATED A1C: CPT | Mod: ZL | Performed by: INTERNAL MEDICINE

## 2021-01-01 PROCEDURE — 92610 EVALUATE SWALLOWING FUNCTION: CPT | Mod: GN

## 2021-01-01 PROCEDURE — A9575 INJ GADOTERATE MEGLUMI 0.1ML: HCPCS | Performed by: INTERNAL MEDICINE

## 2021-01-01 PROCEDURE — 85610 PROTHROMBIN TIME: CPT | Performed by: EMERGENCY MEDICINE

## 2021-01-01 PROCEDURE — 70553 MRI BRAIN STEM W/O & W/DYE: CPT

## 2021-01-01 PROCEDURE — 250N000012 HC RX MED GY IP 250 OP 636 PS 637: Performed by: FAMILY MEDICINE

## 2021-01-01 PROCEDURE — 80053 COMPREHEN METABOLIC PANEL: CPT | Mod: ZL | Performed by: INTERNAL MEDICINE

## 2021-01-01 PROCEDURE — 71045 X-RAY EXAM CHEST 1 VIEW: CPT

## 2021-01-01 PROCEDURE — U0003 INFECTIOUS AGENT DETECTION BY NUCLEIC ACID (DNA OR RNA); SEVERE ACUTE RESPIRATORY SYNDROME CORONAVIRUS 2 (SARS-COV-2) (CORONAVIRUS DISEASE [COVID-19]), AMPLIFIED PROBE TECHNIQUE, MAKING USE OF HIGH THROUGHPUT TECHNOLOGIES AS DESCRIBED BY CMS-2020-01-R: HCPCS | Performed by: EMERGENCY MEDICINE

## 2021-01-01 PROCEDURE — 87798 DETECT AGENT NOS DNA AMP: CPT | Performed by: FAMILY MEDICINE

## 2021-01-01 PROCEDURE — 87040 BLOOD CULTURE FOR BACTERIA: CPT | Mod: 91 | Performed by: EMERGENCY MEDICINE

## 2021-01-01 PROCEDURE — 85041 AUTOMATED RBC COUNT: CPT | Mod: ZL | Performed by: INTERNAL MEDICINE

## 2021-01-01 PROCEDURE — 82805 BLOOD GASES W/O2 SATURATION: CPT | Performed by: EMERGENCY MEDICINE

## 2021-01-01 PROCEDURE — 80053 COMPREHEN METABOLIC PANEL: CPT | Performed by: EMERGENCY MEDICINE

## 2021-01-01 PROCEDURE — U0005 INFEC AGEN DETEC AMPLI PROBE: HCPCS | Performed by: EMERGENCY MEDICINE

## 2021-01-01 PROCEDURE — 97161 PT EVAL LOW COMPLEX 20 MIN: CPT | Mod: GP

## 2021-01-01 PROCEDURE — 93005 ELECTROCARDIOGRAM TRACING: CPT | Performed by: EMERGENCY MEDICINE

## 2021-01-01 PROCEDURE — 99225 PR SUBSEQUENT OBSERVATION CARE,LEVEL II: CPT | Performed by: FAMILY MEDICINE

## 2021-01-01 PROCEDURE — 85027 COMPLETE CBC AUTOMATED: CPT | Performed by: FAMILY MEDICINE

## 2021-01-01 PROCEDURE — 82607 VITAMIN B-12: CPT | Mod: ZL | Performed by: INTERNAL MEDICINE

## 2021-01-01 PROCEDURE — 96365 THER/PROPH/DIAG IV INF INIT: CPT | Performed by: EMERGENCY MEDICINE

## 2021-01-01 PROCEDURE — 36415 COLL VENOUS BLD VENIPUNCTURE: CPT | Performed by: EMERGENCY MEDICINE

## 2021-01-01 PROCEDURE — 97165 OT EVAL LOW COMPLEX 30 MIN: CPT | Mod: GO | Performed by: OCCUPATIONAL THERAPIST

## 2021-01-01 PROCEDURE — 99219 PR INITIAL OBSERVATION CARE,LEVEL II: CPT | Performed by: FAMILY MEDICINE

## 2021-01-01 RX ORDER — NICOTINE 21 MG/24HR
1 PATCH, TRANSDERMAL 24 HOURS TRANSDERMAL DAILY
Qty: 30 PATCH | Refills: 1 | Status: SHIPPED | OUTPATIENT
Start: 2021-01-01 | End: 2021-01-01

## 2021-01-01 RX ORDER — ACETAMINOPHEN 325 MG/1
650 TABLET ORAL EVERY 4 HOURS PRN
Status: DISCONTINUED | OUTPATIENT
Start: 2021-01-01 | End: 2021-01-01 | Stop reason: HOSPADM

## 2021-01-01 RX ORDER — OLANZAPINE 5 MG/1
5 TABLET, ORALLY DISINTEGRATING ORAL AT BEDTIME
Status: DISCONTINUED | OUTPATIENT
Start: 2021-01-01 | End: 2021-01-01 | Stop reason: HOSPADM

## 2021-01-01 RX ORDER — LIDOCAINE 40 MG/G
CREAM TOPICAL
Status: DISCONTINUED | OUTPATIENT
Start: 2021-01-01 | End: 2021-01-01 | Stop reason: HOSPADM

## 2021-01-01 RX ORDER — QUETIAPINE FUMARATE 25 MG/1
25 TABLET, FILM COATED ORAL 2 TIMES DAILY
Qty: 60 TABLET | Refills: 4 | Status: SHIPPED | OUTPATIENT
Start: 2021-01-01 | End: 2021-01-01

## 2021-01-01 RX ORDER — OMEPRAZOLE 20 MG/1
20 TABLET, DELAYED RELEASE ORAL DAILY
Qty: 30 TABLET | Refills: 0 | Status: SHIPPED | OUTPATIENT
Start: 2021-01-01 | End: 2021-01-01

## 2021-01-01 RX ORDER — IBUPROFEN 600 MG/1
600 TABLET, FILM COATED ORAL EVERY 6 HOURS PRN
Status: DISCONTINUED | OUTPATIENT
Start: 2021-01-01 | End: 2021-01-01 | Stop reason: HOSPADM

## 2021-01-01 RX ORDER — SENNOSIDES 8.6 MG
2 TABLET ORAL 2 TIMES DAILY
COMMUNITY
Start: 2021-01-01 | End: 2022-01-01

## 2021-01-01 RX ORDER — QUETIAPINE FUMARATE 25 MG/1
25 TABLET, FILM COATED ORAL 2 TIMES DAILY
Qty: 60 TABLET | Refills: 0 | Status: SHIPPED | OUTPATIENT
Start: 2021-01-01 | End: 2021-01-01

## 2021-01-01 RX ORDER — ONDANSETRON 2 MG/ML
4 INJECTION INTRAMUSCULAR; INTRAVENOUS EVERY 6 HOURS PRN
Status: DISCONTINUED | OUTPATIENT
Start: 2021-01-01 | End: 2021-01-01 | Stop reason: HOSPADM

## 2021-01-01 RX ORDER — GADOTERATE MEGLUMINE 376.9 MG/ML
15 INJECTION INTRAVENOUS ONCE
Status: COMPLETED | OUTPATIENT
Start: 2021-01-01 | End: 2021-01-01

## 2021-01-01 RX ORDER — DOXYCYCLINE 100 MG/1
100 CAPSULE ORAL EVERY 12 HOURS
Qty: 8 CAPSULE | Refills: 0 | Status: SHIPPED | OUTPATIENT
Start: 2021-01-01 | End: 2021-01-01

## 2021-01-01 RX ORDER — SERTRALINE HYDROCHLORIDE 25 MG/1
25 TABLET, FILM COATED ORAL DAILY
Qty: 45 TABLET | Refills: 3 | Status: SHIPPED | OUTPATIENT
Start: 2021-01-01 | End: 2022-01-01

## 2021-01-01 RX ORDER — LORAZEPAM 2 MG/ML
0.5 INJECTION INTRAMUSCULAR EVERY 4 HOURS PRN
Status: DISCONTINUED | OUTPATIENT
Start: 2021-01-01 | End: 2021-01-01 | Stop reason: HOSPADM

## 2021-01-01 RX ORDER — MESALAMINE 400 MG/1
800 CAPSULE, DELAYED RELEASE ORAL 2 TIMES DAILY
Status: ON HOLD | COMMUNITY
End: 2021-01-01

## 2021-01-01 RX ORDER — QUETIAPINE FUMARATE 25 MG/1
25 TABLET, FILM COATED ORAL 2 TIMES DAILY
Status: DISCONTINUED | OUTPATIENT
Start: 2021-01-01 | End: 2021-01-01 | Stop reason: HOSPADM

## 2021-01-01 RX ORDER — GLUCOSAMINE HCL/CHONDROITIN SU 500-400 MG
CAPSULE ORAL
Qty: 100 EACH | Refills: 3 | Status: SHIPPED | OUTPATIENT
Start: 2021-01-01

## 2021-01-01 RX ORDER — POLYETHYLENE GLYCOL 3350 17 G/17G
17 POWDER, FOR SOLUTION ORAL DAILY PRN
Status: DISCONTINUED | OUTPATIENT
Start: 2021-01-01 | End: 2021-01-01 | Stop reason: HOSPADM

## 2021-01-01 RX ORDER — ACETAMINOPHEN 500 MG
1000 TABLET ORAL ONCE
Status: COMPLETED | OUTPATIENT
Start: 2021-01-01 | End: 2021-01-01

## 2021-01-01 RX ORDER — ONDANSETRON 4 MG/1
4 TABLET, ORALLY DISINTEGRATING ORAL EVERY 6 HOURS PRN
Status: DISCONTINUED | OUTPATIENT
Start: 2021-01-01 | End: 2021-01-01 | Stop reason: HOSPADM

## 2021-01-01 RX ORDER — QUETIAPINE FUMARATE 25 MG/1
25 TABLET, FILM COATED ORAL 3 TIMES DAILY
Qty: 90 TABLET | Refills: 1 | Status: SHIPPED | OUTPATIENT
Start: 2021-01-01 | End: 2022-01-01

## 2021-01-01 RX ORDER — LORAZEPAM 0.5 MG/1
0.5 TABLET ORAL ONCE
Status: COMPLETED | OUTPATIENT
Start: 2021-01-01 | End: 2021-01-01

## 2021-01-01 RX ORDER — KETOROLAC TROMETHAMINE 15 MG/ML
15 INJECTION, SOLUTION INTRAMUSCULAR; INTRAVENOUS ONCE
Status: DISCONTINUED | OUTPATIENT
Start: 2021-01-01 | End: 2021-01-01

## 2021-01-01 RX ORDER — LORAZEPAM 2 MG/ML
1 INJECTION INTRAMUSCULAR ONCE
Status: COMPLETED | OUTPATIENT
Start: 2021-01-01 | End: 2021-01-01

## 2021-01-01 RX ORDER — DOXYCYCLINE 100 MG/10ML
100 INJECTION, POWDER, LYOPHILIZED, FOR SOLUTION INTRAVENOUS EVERY 12 HOURS
Status: DISCONTINUED | OUTPATIENT
Start: 2021-01-01 | End: 2021-01-01

## 2021-01-01 RX ORDER — GLIPIZIDE 10 MG/1
TABLET ORAL
Qty: 180 TABLET | Refills: 3 | COMMUNITY
Start: 2021-01-01 | End: 2022-01-01

## 2021-01-01 RX ORDER — SODIUM CHLORIDE 9 MG/ML
INJECTION, SOLUTION INTRAVENOUS CONTINUOUS
Status: DISCONTINUED | OUTPATIENT
Start: 2021-01-01 | End: 2021-01-01

## 2021-01-01 RX ORDER — DOXYCYCLINE 100 MG/1
100 CAPSULE ORAL EVERY 12 HOURS SCHEDULED
Status: DISCONTINUED | OUTPATIENT
Start: 2021-01-01 | End: 2021-01-01 | Stop reason: HOSPADM

## 2021-01-01 RX ORDER — MESALAMINE 0.38 G/1
0.75 CAPSULE, EXTENDED RELEASE ORAL 2 TIMES DAILY
COMMUNITY
End: 2022-01-01

## 2021-01-01 RX ORDER — OLANZAPINE 10 MG/2ML
5 INJECTION, POWDER, FOR SOLUTION INTRAMUSCULAR
Status: DISCONTINUED | OUTPATIENT
Start: 2021-01-01 | End: 2021-01-01 | Stop reason: HOSPADM

## 2021-01-01 RX ORDER — DEXTROSE MONOHYDRATE 25 G/50ML
25-50 INJECTION, SOLUTION INTRAVENOUS
Status: DISCONTINUED | OUTPATIENT
Start: 2021-01-01 | End: 2021-01-01 | Stop reason: HOSPADM

## 2021-01-01 RX ORDER — AMOXICILLIN 250 MG
1 CAPSULE ORAL 2 TIMES DAILY PRN
Status: DISCONTINUED | OUTPATIENT
Start: 2021-01-01 | End: 2021-01-01 | Stop reason: HOSPADM

## 2021-01-01 RX ORDER — NICOTINE 21 MG/24HR
1 PATCH, TRANSDERMAL 24 HOURS TRANSDERMAL DAILY
Status: DISCONTINUED | OUTPATIENT
Start: 2021-01-01 | End: 2021-01-01 | Stop reason: HOSPADM

## 2021-01-01 RX ORDER — NICOTINE POLACRILEX 4 MG
15-30 LOZENGE BUCCAL
Status: DISCONTINUED | OUTPATIENT
Start: 2021-01-01 | End: 2021-01-01 | Stop reason: HOSPADM

## 2021-01-01 RX ORDER — PANTOPRAZOLE SODIUM 40 MG/1
40 TABLET, DELAYED RELEASE ORAL EVERY MORNING
Status: DISCONTINUED | OUTPATIENT
Start: 2021-01-01 | End: 2021-01-01 | Stop reason: HOSPADM

## 2021-01-01 RX ORDER — AMOXICILLIN 250 MG
2 CAPSULE ORAL 2 TIMES DAILY PRN
Status: DISCONTINUED | OUTPATIENT
Start: 2021-01-01 | End: 2021-01-01 | Stop reason: HOSPADM

## 2021-01-01 RX ORDER — LANCETS
EACH MISCELLANEOUS
Qty: 200 EACH | Refills: 11 | Status: SHIPPED | OUTPATIENT
Start: 2021-01-01

## 2021-01-01 RX ORDER — POTASSIUM CHLORIDE 1500 MG/1
40 TABLET, EXTENDED RELEASE ORAL ONCE
Status: COMPLETED | OUTPATIENT
Start: 2021-01-01 | End: 2021-01-01

## 2021-01-01 RX ORDER — LOSARTAN POTASSIUM 100 MG/1
100 TABLET ORAL DAILY
Qty: 90 TABLET | Refills: 0 | OUTPATIENT
Start: 2021-01-01

## 2021-01-01 RX ORDER — LOSARTAN POTASSIUM 100 MG/1
100 TABLET ORAL DAILY
Qty: 30 TABLET | Refills: 0 | Status: SHIPPED | OUTPATIENT
Start: 2021-01-01 | End: 2022-01-01

## 2021-01-01 RX ADMIN — DOXYCYCLINE 100 MG: 100 INJECTION, POWDER, LYOPHILIZED, FOR SOLUTION INTRAVENOUS at 23:05

## 2021-01-01 RX ADMIN — Medication 1 PATCH: at 09:39

## 2021-01-01 RX ADMIN — SODIUM CHLORIDE: 9 INJECTION, SOLUTION INTRAVENOUS at 05:26

## 2021-01-01 RX ADMIN — DOXYCYCLINE 100 MG: 100 INJECTION, POWDER, LYOPHILIZED, FOR SOLUTION INTRAVENOUS at 23:24

## 2021-01-01 RX ADMIN — TAZOBACTAM SODIUM AND PIPERACILLIN SODIUM 4.5 G: 500; 4 INJECTION, SOLUTION INTRAVENOUS at 17:11

## 2021-01-01 RX ADMIN — INSULIN ASPART 2 UNITS: 100 INJECTION, SOLUTION INTRAVENOUS; SUBCUTANEOUS at 21:57

## 2021-01-01 RX ADMIN — LORAZEPAM 1 MG: 2 INJECTION, SOLUTION INTRAMUSCULAR; INTRAVENOUS at 23:15

## 2021-01-01 RX ADMIN — DOXYCYCLINE 100 MG: 100 INJECTION, POWDER, LYOPHILIZED, FOR SOLUTION INTRAVENOUS at 11:00

## 2021-01-01 RX ADMIN — DOXYCYCLINE 100 MG: 100 INJECTION, POWDER, LYOPHILIZED, FOR SOLUTION INTRAVENOUS at 09:46

## 2021-01-01 RX ADMIN — TAZOBACTAM SODIUM AND PIPERACILLIN SODIUM 4.5 G: 500; 4 INJECTION, SOLUTION INTRAVENOUS at 22:00

## 2021-01-01 RX ADMIN — POLYETHYLENE GLYCOL 3350 17 G: 17 POWDER, FOR SOLUTION ORAL at 09:38

## 2021-01-01 RX ADMIN — QUETIAPINE FUMARATE 25 MG: 25 TABLET ORAL at 20:38

## 2021-01-01 RX ADMIN — QUETIAPINE FUMARATE 25 MG: 25 TABLET ORAL at 09:52

## 2021-01-01 RX ADMIN — DOCUSATE SODIUM 50 MG AND SENNOSIDES 8.6 MG 2 TABLET: 8.6; 5 TABLET, FILM COATED ORAL at 09:39

## 2021-01-01 RX ADMIN — DOXYCYCLINE 100 MG: 100 INJECTION, POWDER, LYOPHILIZED, FOR SOLUTION INTRAVENOUS at 10:48

## 2021-01-01 RX ADMIN — PANTOPRAZOLE SODIUM 40 MG: 40 TABLET, DELAYED RELEASE ORAL at 07:45

## 2021-01-01 RX ADMIN — SODIUM CHLORIDE: 9 INJECTION, SOLUTION INTRAVENOUS at 19:55

## 2021-01-01 RX ADMIN — OLANZAPINE 5 MG: 5 TABLET, ORALLY DISINTEGRATING ORAL at 20:38

## 2021-01-01 RX ADMIN — Medication 1 PATCH: at 10:47

## 2021-01-01 RX ADMIN — INSULIN ASPART 2 UNITS: 100 INJECTION, SOLUTION INTRAVENOUS; SUBCUTANEOUS at 22:28

## 2021-01-01 RX ADMIN — VANCOMYCIN HYDROCHLORIDE 1500 MG: 10 INJECTION, POWDER, LYOPHILIZED, FOR SOLUTION INTRAVENOUS at 17:38

## 2021-01-01 RX ADMIN — SODIUM CHLORIDE: 9 INJECTION, SOLUTION INTRAVENOUS at 10:47

## 2021-01-01 RX ADMIN — SODIUM CHLORIDE 1000 ML: 9 INJECTION, SOLUTION INTRAVENOUS at 14:10

## 2021-01-01 RX ADMIN — DOXYCYCLINE 100 MG: 100 INJECTION, POWDER, LYOPHILIZED, FOR SOLUTION INTRAVENOUS at 21:58

## 2021-01-01 RX ADMIN — PANTOPRAZOLE SODIUM 40 MG: 40 TABLET, DELAYED RELEASE ORAL at 08:07

## 2021-01-01 RX ADMIN — TAZOBACTAM SODIUM AND PIPERACILLIN SODIUM 4.5 G: 500; 4 INJECTION, SOLUTION INTRAVENOUS at 11:52

## 2021-01-01 RX ADMIN — GADOTERATE MEGLUMINE 15 ML: 376.9 INJECTION INTRAVENOUS at 11:04

## 2021-01-01 RX ADMIN — DOXYCYCLINE 100 MG: 100 INJECTION, POWDER, LYOPHILIZED, FOR SOLUTION INTRAVENOUS at 09:51

## 2021-01-01 RX ADMIN — TAZOBACTAM SODIUM AND PIPERACILLIN SODIUM 4.5 G: 500; 4 INJECTION, SOLUTION INTRAVENOUS at 09:50

## 2021-01-01 RX ADMIN — QUETIAPINE FUMARATE 25 MG: 25 TABLET ORAL at 09:42

## 2021-01-01 RX ADMIN — TAZOBACTAM SODIUM AND PIPERACILLIN SODIUM 4.5 G: 500; 4 INJECTION, SOLUTION INTRAVENOUS at 05:14

## 2021-01-01 RX ADMIN — TAZOBACTAM SODIUM AND PIPERACILLIN SODIUM 4.5 G: 500; 4 INJECTION, SOLUTION INTRAVENOUS at 04:45

## 2021-01-01 RX ADMIN — PANTOPRAZOLE SODIUM 40 MG: 40 TABLET, DELAYED RELEASE ORAL at 08:27

## 2021-01-01 RX ADMIN — POTASSIUM CHLORIDE 40 MEQ: 1500 TABLET, EXTENDED RELEASE ORAL at 08:27

## 2021-01-01 RX ADMIN — LORAZEPAM 0.5 MG: 2 INJECTION, SOLUTION INTRAMUSCULAR; INTRAVENOUS at 12:58

## 2021-01-01 RX ADMIN — QUETIAPINE FUMARATE 25 MG: 25 TABLET ORAL at 09:57

## 2021-01-01 RX ADMIN — QUETIAPINE FUMARATE 25 MG: 25 TABLET ORAL at 21:54

## 2021-01-01 RX ADMIN — TAZOBACTAM SODIUM AND PIPERACILLIN SODIUM 4.5 G: 500; 4 INJECTION, SOLUTION INTRAVENOUS at 16:30

## 2021-01-01 RX ADMIN — DOXYCYCLINE 100 MG: 100 INJECTION, POWDER, LYOPHILIZED, FOR SOLUTION INTRAVENOUS at 10:11

## 2021-01-01 RX ADMIN — OLANZAPINE 5 MG: 5 TABLET, ORALLY DISINTEGRATING ORAL at 21:54

## 2021-01-01 RX ADMIN — TAZOBACTAM SODIUM AND PIPERACILLIN SODIUM 4.5 G: 500; 4 INJECTION, SOLUTION INTRAVENOUS at 15:40

## 2021-01-01 RX ADMIN — TAZOBACTAM SODIUM AND PIPERACILLIN SODIUM 4.5 G: 500; 4 INJECTION, SOLUTION INTRAVENOUS at 04:39

## 2021-01-01 RX ADMIN — DOXYCYCLINE 100 MG: 100 INJECTION, POWDER, LYOPHILIZED, FOR SOLUTION INTRAVENOUS at 09:42

## 2021-01-01 RX ADMIN — OLANZAPINE 5 MG: 5 TABLET, ORALLY DISINTEGRATING ORAL at 21:30

## 2021-01-01 RX ADMIN — Medication 1 PATCH: at 09:50

## 2021-01-01 RX ADMIN — TAZOBACTAM SODIUM AND PIPERACILLIN SODIUM 4.5 G: 500; 4 INJECTION, SOLUTION INTRAVENOUS at 16:28

## 2021-01-01 RX ADMIN — SODIUM CHLORIDE: 9 INJECTION, SOLUTION INTRAVENOUS at 16:09

## 2021-01-01 RX ADMIN — QUETIAPINE FUMARATE 25 MG: 25 TABLET ORAL at 14:14

## 2021-01-01 RX ADMIN — DOXYCYCLINE 100 MG: 100 INJECTION, POWDER, LYOPHILIZED, FOR SOLUTION INTRAVENOUS at 22:10

## 2021-01-01 RX ADMIN — LORAZEPAM 0.5 MG: 2 INJECTION, SOLUTION INTRAMUSCULAR; INTRAVENOUS at 16:03

## 2021-01-01 RX ADMIN — QUETIAPINE FUMARATE 25 MG: 25 TABLET ORAL at 21:30

## 2021-01-01 RX ADMIN — IBUPROFEN 600 MG: 600 TABLET ORAL at 20:47

## 2021-01-01 RX ADMIN — TAZOBACTAM SODIUM AND PIPERACILLIN SODIUM 4.5 G: 500; 4 INJECTION, SOLUTION INTRAVENOUS at 22:29

## 2021-01-01 RX ADMIN — DOXYCYCLINE 100 MG: 100 INJECTION, POWDER, LYOPHILIZED, FOR SOLUTION INTRAVENOUS at 20:42

## 2021-01-01 RX ADMIN — SODIUM CHLORIDE: 9 INJECTION, SOLUTION INTRAVENOUS at 21:12

## 2021-01-01 RX ADMIN — QUETIAPINE FUMARATE 25 MG: 25 TABLET ORAL at 10:13

## 2021-01-01 RX ADMIN — ACETAMINOPHEN 1000 MG: 500 TABLET, FILM COATED ORAL at 14:10

## 2021-01-01 RX ADMIN — VANCOMYCIN HYDROCHLORIDE 1750 MG: 1 INJECTION, POWDER, LYOPHILIZED, FOR SOLUTION INTRAVENOUS at 17:18

## 2021-01-01 RX ADMIN — INSULIN ASPART 1 UNITS: 100 INJECTION, SOLUTION INTRAVENOUS; SUBCUTANEOUS at 21:34

## 2021-01-01 RX ADMIN — DOXYCYCLINE 100 MG: 100 INJECTION, POWDER, LYOPHILIZED, FOR SOLUTION INTRAVENOUS at 23:18

## 2021-01-01 RX ADMIN — SODIUM CHLORIDE: 9 INJECTION, SOLUTION INTRAVENOUS at 08:19

## 2021-01-01 RX ADMIN — QUETIAPINE FUMARATE 25 MG: 25 TABLET ORAL at 22:28

## 2021-01-01 RX ADMIN — TAZOBACTAM SODIUM AND PIPERACILLIN SODIUM 4.5 G: 500; 4 INJECTION, SOLUTION INTRAVENOUS at 09:12

## 2021-01-01 RX ADMIN — TAZOBACTAM SODIUM AND PIPERACILLIN SODIUM 4.5 G: 500; 4 INJECTION, SOLUTION INTRAVENOUS at 21:30

## 2021-01-01 RX ADMIN — OLANZAPINE 5 MG: 5 TABLET, ORALLY DISINTEGRATING ORAL at 22:28

## 2021-01-01 RX ADMIN — Medication 1 PATCH: at 10:15

## 2021-01-01 RX ADMIN — PANTOPRAZOLE SODIUM 40 MG: 40 TABLET, DELAYED RELEASE ORAL at 08:52

## 2021-01-01 RX ADMIN — PANTOPRAZOLE SODIUM 40 MG: 40 TABLET, DELAYED RELEASE ORAL at 08:14

## 2021-01-01 RX ADMIN — SODIUM CHLORIDE: 9 INJECTION, SOLUTION INTRAVENOUS at 01:50

## 2021-01-01 RX ADMIN — LORAZEPAM 0.5 MG: 0.5 TABLET ORAL at 10:19

## 2021-01-01 RX ADMIN — TAZOBACTAM SODIUM AND PIPERACILLIN SODIUM 4.5 G: 500; 4 INJECTION, SOLUTION INTRAVENOUS at 09:57

## 2021-01-01 RX ADMIN — TAZOBACTAM SODIUM AND PIPERACILLIN SODIUM 4.5 G: 500; 4 INJECTION, SOLUTION INTRAVENOUS at 04:05

## 2021-01-01 ASSESSMENT — ACTIVITIES OF DAILY LIVING (ADL)
ADLS_ACUITY_SCORE: 17
EATING/SWALLOWING: SWALLOWING SOLID FOOD
ADLS_ACUITY_SCORE: 17
FALL_HISTORY_WITHIN_LAST_SIX_MONTHS: NO
ADLS_ACUITY_SCORE: 17
DRESSING/BATHING: BATHING DIFFICULTY, ASSISTANCE 1 PERSON
ADLS_ACUITY_SCORE: 17
CONCENTRATING,_REMEMBERING_OR_MAKING_DECISIONS_DIFFICULTY: YES
ADLS_ACUITY_SCORE: 17
DIFFICULTY_COMMUNICATING: NO
ADLS_ACUITY_SCORE: 17
DRESSING/BATHING_DIFFICULTY: YES
ADLS_ACUITY_SCORE: 17
TOILETING_ASSISTANCE: TOILETING DIFFICULTY, REQUIRES EQUIPMENT
ADLS_ACUITY_SCORE: 17
WALKING_OR_CLIMBING_STAIRS: AMBULATION DIFFICULTY, REQUIRES EQUIPMENT
ADLS_ACUITY_SCORE: 17
ADLS_ACUITY_SCORE: 17
DOING_ERRANDS_INDEPENDENTLY_DIFFICULTY: YES
ADLS_ACUITY_SCORE: 17
EQUIPMENT_CURRENTLY_USED_AT_HOME: CANE, QUAD
DIFFICULTY_EATING/SWALLOWING: YES
ADLS_ACUITY_SCORE: 17
WEAR_GLASSES_OR_BLIND: YES
ADLS_ACUITY_SCORE: 17
ADLS_ACUITY_SCORE: 17
VISION_MANAGEMENT: GLASSES
TOILETING_ISSUES: YES
WALKING_OR_CLIMBING_STAIRS_DIFFICULTY: YES
ADLS_ACUITY_SCORE: 17

## 2021-01-01 ASSESSMENT — ENCOUNTER SYMPTOMS
SEIZURES: 0
LIGHT-HEADEDNESS: 0
ABDOMINAL PAIN: 0
FREQUENCY: 0
RHINORRHEA: 0
CHILLS: 0
ARTHRALGIAS: 0
SHORTNESS OF BREATH: 0
PALPITATIONS: 0
COUGH: 0
BLOOD IN STOOL: 0
NECK PAIN: 0
WEAKNESS: 1
VOMITING: 0
DIARRHEA: 0
SORE THROAT: 0
PHOTOPHOBIA: 0
CONFUSION: 1
NAUSEA: 0
CONSTIPATION: 0
HEADACHES: 0
SPEECH DIFFICULTY: 0
FEVER: 1
NUMBNESS: 0
FATIGUE: 0
DIZZINESS: 0
DYSURIA: 0

## 2021-01-01 ASSESSMENT — PAIN SCALES - GENERAL
PAINLEVEL: NO PAIN (0)
PAINLEVEL: NO PAIN (0)

## 2021-01-01 ASSESSMENT — ANXIETY QUESTIONNAIRES
2. NOT BEING ABLE TO STOP OR CONTROL WORRYING: NOT AT ALL
1. FEELING NERVOUS, ANXIOUS, OR ON EDGE: NOT AT ALL
GAD7 TOTAL SCORE: 0
6. BECOMING EASILY ANNOYED OR IRRITABLE: NOT AT ALL
3. WORRYING TOO MUCH ABOUT DIFFERENT THINGS: NOT AT ALL
GAD7 TOTAL SCORE: 0
5. BEING SO RESTLESS THAT IT IS HARD TO SIT STILL: NOT AT ALL
7. FEELING AFRAID AS IF SOMETHING AWFUL MIGHT HAPPEN: NOT AT ALL
IF YOU CHECKED OFF ANY PROBLEMS ON THIS QUESTIONNAIRE, HOW DIFFICULT HAVE THESE PROBLEMS MADE IT FOR YOU TO DO YOUR WORK, TAKE CARE OF THINGS AT HOME, OR GET ALONG WITH OTHER PEOPLE: NOT DIFFICULT AT ALL

## 2021-01-01 ASSESSMENT — COLUMBIA-SUICIDE SEVERITY RATING SCALE - C-SSRS
2. HAVE YOU ACTUALLY HAD ANY THOUGHTS OF KILLING YOURSELF IN THE PAST MONTH?: NO
1. IN THE PAST MONTH, HAVE YOU WISHED YOU WERE DEAD OR WISHED YOU COULD GO TO SLEEP AND NOT WAKE UP?: NO

## 2021-01-01 ASSESSMENT — PATIENT HEALTH QUESTIONNAIRE - PHQ9: 5. POOR APPETITE OR OVEREATING: NOT AT ALL

## 2021-01-01 ASSESSMENT — MIFFLIN-ST. JEOR: SCORE: 1375.42

## 2021-02-02 ENCOUNTER — IMMUNIZATION (OUTPATIENT)
Dept: FAMILY MEDICINE | Facility: OTHER | Age: 81
End: 2021-02-02
Attending: INTERNAL MEDICINE
Payer: COMMERCIAL

## 2021-02-02 PROCEDURE — 91300 PR COVID VAC PFIZER DIL RECON 30 MCG/0.3 ML IM: CPT

## 2021-02-04 DIAGNOSIS — E11.9 CONTROLLED TYPE 2 DIABETES MELLITUS WITHOUT COMPLICATION, WITH LONG-TERM CURRENT USE OF INSULIN (H): ICD-10-CM

## 2021-02-04 DIAGNOSIS — Z79.4 CONTROLLED TYPE 2 DIABETES MELLITUS WITHOUT COMPLICATION, WITH LONG-TERM CURRENT USE OF INSULIN (H): ICD-10-CM

## 2021-02-04 RX ORDER — GLIPIZIDE 10 MG/1
TABLET ORAL
Qty: 180 TABLET | Refills: 3 | Status: SHIPPED | OUTPATIENT
Start: 2021-02-04 | End: 2021-01-01

## 2021-02-04 NOTE — TELEPHONE ENCOUNTER
JOSE sent Rx request for the following:      glipizide 10 mg tablet  Sig: Take 1 tablet (10 mg) by mouth 2 times daily (before meals) In AM and PM -- and add 1/2 tablet daily with Lunch      Last Prescription Date:   8/19/2020  Last Fill Qty/Refills:         180, R-3    Last Office Visit:              9/11/2020   Future Office visit:           none    Prescription refilled per RN Medication Refill Policy.................... Andrade Modi RN ....................  2/4/2021   3:50 PM

## 2021-02-05 DIAGNOSIS — C15.9 ADENOCARCINOMA OF ESOPHAGUS (H): Primary | ICD-10-CM

## 2021-02-23 ENCOUNTER — IMMUNIZATION (OUTPATIENT)
Dept: FAMILY MEDICINE | Facility: OTHER | Age: 81
End: 2021-02-23
Attending: FAMILY MEDICINE
Payer: COMMERCIAL

## 2021-02-23 PROCEDURE — 91300 PR COVID VAC PFIZER DIL RECON 30 MCG/0.3 ML IM: CPT

## 2021-04-05 ENCOUNTER — HOSPITAL ENCOUNTER (OUTPATIENT)
Dept: CT IMAGING | Facility: OTHER | Age: 81
End: 2021-04-05
Attending: INTERNAL MEDICINE
Payer: COMMERCIAL

## 2021-04-05 ENCOUNTER — TELEPHONE (OUTPATIENT)
Dept: ONCOLOGY | Facility: OTHER | Age: 81
End: 2021-04-05

## 2021-04-05 DIAGNOSIS — C15.9 ADENOCARCINOMA OF ESOPHAGUS (H): ICD-10-CM

## 2021-04-05 LAB
ALBUMIN SERPL-MCNC: 4.4 G/DL (ref 3.5–5.7)
ALP SERPL-CCNC: 64 U/L (ref 34–104)
ALT SERPL W P-5'-P-CCNC: 10 U/L (ref 7–52)
ANION GAP SERPL CALCULATED.3IONS-SCNC: 8 MMOL/L (ref 3–14)
AST SERPL W P-5'-P-CCNC: 13 U/L (ref 13–39)
BASOPHILS # BLD AUTO: 0 10E9/L (ref 0–0.2)
BASOPHILS NFR BLD AUTO: 0.4 %
BILIRUB SERPL-MCNC: 0.5 MG/DL (ref 0.3–1)
BUN SERPL-MCNC: 23 MG/DL (ref 7–25)
CALCIUM SERPL-MCNC: 9.4 MG/DL (ref 8.6–10.3)
CEA SERPL-MCNC: 3.6 NG/ML
CHLORIDE SERPL-SCNC: 98 MMOL/L (ref 98–107)
CO2 SERPL-SCNC: 26 MMOL/L (ref 21–31)
CREAT SERPL-MCNC: 0.87 MG/DL (ref 0.7–1.3)
DIFFERENTIAL METHOD BLD: ABNORMAL
EOSINOPHIL # BLD AUTO: 0.1 10E9/L (ref 0–0.7)
EOSINOPHIL NFR BLD AUTO: 2.6 %
ERYTHROCYTE [DISTWIDTH] IN BLOOD BY AUTOMATED COUNT: 12.1 % (ref 10–15)
GFR SERPL CREATININE-BSD FRML MDRD: 84 ML/MIN/{1.73_M2}
GLUCOSE SERPL-MCNC: 544 MG/DL (ref 70–105)
HCT VFR BLD AUTO: 37.5 % (ref 40–53)
HGB BLD-MCNC: 13.1 G/DL (ref 13.3–17.7)
IMM GRANULOCYTES # BLD: 0 10E9/L (ref 0–0.4)
IMM GRANULOCYTES NFR BLD: 0.4 %
LDH SERPL L TO P-CCNC: 113 U/L (ref 140–271)
LYMPHOCYTES # BLD AUTO: 0.6 10E9/L (ref 0.8–5.3)
LYMPHOCYTES NFR BLD AUTO: 10.4 %
MCH RBC QN AUTO: 32 PG (ref 26.5–33)
MCHC RBC AUTO-ENTMCNC: 34.9 G/DL (ref 31.5–36.5)
MCV RBC AUTO: 92 FL (ref 78–100)
MONOCYTES # BLD AUTO: 0.4 10E9/L (ref 0–1.3)
MONOCYTES NFR BLD AUTO: 6.6 %
NEUTROPHILS # BLD AUTO: 4.4 10E9/L (ref 1.6–8.3)
NEUTROPHILS NFR BLD AUTO: 79.6 %
PLATELET # BLD AUTO: 156 10E9/L (ref 150–450)
POTASSIUM SERPL-SCNC: 4.4 MMOL/L (ref 3.5–5.1)
PROT SERPL-MCNC: 6.9 G/DL (ref 6.4–8.9)
RBC # BLD AUTO: 4.1 10E12/L (ref 4.4–5.9)
SODIUM SERPL-SCNC: 132 MMOL/L (ref 134–144)
WBC # BLD AUTO: 5.5 10E9/L (ref 4–11)

## 2021-04-05 PROCEDURE — 74177 CT ABD & PELVIS W/CONTRAST: CPT

## 2021-04-05 PROCEDURE — 255N000002 HC RX 255 OP 636: Performed by: INTERNAL MEDICINE

## 2021-04-05 PROCEDURE — 80053 COMPREHEN METABOLIC PANEL: CPT | Mod: ZL | Performed by: INTERNAL MEDICINE

## 2021-04-05 PROCEDURE — 83615 LACTATE (LD) (LDH) ENZYME: CPT | Mod: ZL | Performed by: INTERNAL MEDICINE

## 2021-04-05 PROCEDURE — 85025 COMPLETE CBC W/AUTO DIFF WBC: CPT | Mod: ZL | Performed by: INTERNAL MEDICINE

## 2021-04-05 PROCEDURE — 36415 COLL VENOUS BLD VENIPUNCTURE: CPT | Mod: ZL | Performed by: INTERNAL MEDICINE

## 2021-04-05 PROCEDURE — 82378 CARCINOEMBRYONIC ANTIGEN: CPT | Mod: ZL | Performed by: INTERNAL MEDICINE

## 2021-04-05 PROCEDURE — 71260 CT THORAX DX C+: CPT

## 2021-04-05 RX ORDER — IODIXANOL 320 MG/ML
100 INJECTION, SOLUTION INTRAVASCULAR ONCE
Status: COMPLETED | OUTPATIENT
Start: 2021-04-05 | End: 2021-04-05

## 2021-04-05 RX ADMIN — IODIXANOL 100 ML: 320 INJECTION, SOLUTION INTRAVASCULAR at 14:34

## 2021-04-05 NOTE — TELEPHONE ENCOUNTER
Critical high glucose of 544 mg/ dl called from the lab. Updated JAVON Oseguera. She advise to contact patient and have him evaluated in the ER. Call to patient who was unable to be reached at home and unable to leave a message. Call to daughter who reports he is here for CT scan. She report he is often very high or alexander low and it is difficult to maintain consistency for a number of factors 1 of those being memory impairment. Wife was in CT waiting for  and she was updated and educated on information.  Alysa Galvez RN on 4/5/2021 at 2:43 PM

## 2021-06-14 NOTE — PROGRESS NOTES
Assessment & Plan     1. Uncontrolled type 2 diabetes mellitus with hyperglycemia (H)  Diabetes uncontrolled with metformin and glipizide. Discussed options of adding third oral vs adding back long acting insulin. Patient's daughter agreeable to trying Invokana rather than insulin as that will be easier for patient to manage. [facility]. Continue with metformin and glipizide as previously prescribed. Continue to monitor home blood sugar readings. Follow up with myself or PCP in one month or sooner if needed.   - Comprehensive Metabolic Panel; Future  - Hemoglobin A1c; Future  - Hemoglobin A1c  - Comprehensive Metabolic Panel  - canagliflozin (INVOKANA) 100 MG tablet; Take 1 tablet (100 mg) by mouth every morning (before breakfast)  Dispense: 14 tablet; Refill: 0  - canagliflozin (INVOKANA) 100 MG tablet; Take 1 tablet (100 mg) by mouth every morning (before breakfast)  Dispense: 30 tablet; Refill: 3    2. Hyperglycemia  See #1.   - canagliflozin (INVOKANA) 100 MG tablet; Take 1 tablet (100 mg) by mouth every morning (before breakfast)  Dispense: 14 tablet; Refill: 0  - canagliflozin (INVOKANA) 100 MG tablet; Take 1 tablet (100 mg) by mouth every morning (before breakfast)  Dispense: 30 tablet; Refill: 3      Return in about 4 weeks (around 7/12/2021).    Ashanti South PA-C  [facility]   Vikas is a 81 year old with history of Type II Diabetes Mellitus, hyperlipidemia, hypertension, adenocarcinoma of esophagus who presents for the following health issues     HPI   Here for evaluation of blood sugar concerns. Patient has a history of Type II Diabetes Mellitus for which he takes metformin 1000 mg BID, glipizide 10 mg. Last hemoglobin A1c completed 8/16/2020 and was 5.8. CMP completed 4/5/2021 showed a blood glucose level of 544. He was previously on Lantus 45 units until he had difficulties with  remembering dosing and when he had already taken doses. He was in the hospital for hypoglycemia due to double dosing last summer secondary to his memory issues.     Patient and his daughter present today as his blood sugars are quite elevated again. He has been more lethargic, losing weight, thirsty so daughter thought to have blood sugars be monitored at home and they have been elevated. Morning fasting numbers average in 200's, throughout the day averaging mid to upper 300's. Saturday evening was up to 411. Daughter reports that patient and his wife have memory difficulties so she is wondering if he could return to once daily long acting insulin as she would be able to help with these injections. She is also hoping to have [facility] [place] [place] for consideration of once daily home nursing to help with insulin injections and blood sugar monitoring.     Blood sugar in clinic today was 340.      PAST MEDICAL HISTORY:   Past Medical History:   Diagnosis Date     Dementia (H)      HLD (hyperlipidemia)      HTN (hypertension)      Hypomagnesemia     9/10/2015     Other abnormalities of gait and mobility     10/6/2015     Type 2 diabetes mellitus with hyperglycemia (H)     9/10/2015       PAST SURGICAL HISTORY:   Past Surgical History:   Procedure Laterality Date     COLONOSCOPY  09/27/2010 2010,F/U 2020  Ulcerative colitis     COLONOSCOPY N/A 6/30/2020    Procedure: COLONOSCOPY, WITH POLYPECTOMY AND BIOPSY;  Surgeon: Eliseo Arshad MD;  Location:  OR     ESOPHAGOSCOPY, GASTROSCOPY, DUODENOSCOPY (EGD), COMBINED N/A 6/30/2020    Procedure: ESOPHAGOGASTRODUODENOSCOPY, WITH BIOPSY;  Surgeon: Eliseo Arshad MD;  Location:  OR       FAMILY HISTORY:   Family History   Problem Relation Age of Onset     No Known Problems Mother      No Known Problems Father      Throat cancer Maternal Grandmother        SOCIAL HISTORY:   Social History     Tobacco Use     Smoking status: Former  "Smoker     Packs/day: 0.60     Years: 30.00     Pack years: 18.00     Types: Cigarettes     Quit date: 1987     Years since quittin.4     Smokeless tobacco: Current User     Types: Chew   Substance Use Topics     Alcohol use: Yes     Alcohol/week: 10.0 standard drinks     Types: 10 Cans of beer per week     Frequency: 4 or more times a week     Drinks per session: 1 or 2     Comment: 0-2 beer per week      No Known Allergies  Current Outpatient Medications   Medication     aspirin 81 MG chewable tablet     calcium & magnesium carbonates 311-232 MG TABS     canagliflozin (INVOKANA) 100 MG tablet     canagliflozin (INVOKANA) 100 MG tablet     cyanocobalamin (VITAMIN B-12) 1000 MCG tablet     glipiZIDE (GLUCOTROL) 10 MG tablet     losartan (COZAAR) 100 MG tablet     metFORMIN (GLUCOPHAGE) 1000 MG tablet     Multiple Vitamin (MULTI-VITAMINS) TABS     omeprazole (PRILOSEC OTC) 20 MG EC tablet     omeprazole (PRILOSEC) 20 MG DR capsule     ondansetron (ZOFRAN) 4 MG tablet     OVER-THE-COUNTER     simvastatin (ZOCOR) 40 MG tablet     No current facility-administered medications for this visit.          Review of Systems   Per HPI.         Objective    /56   Pulse 93   Temp 96.8  F (36  C)   Resp 12   Ht 1.721 m (5' 7.75\")   Wt 70.5 kg (155 lb 6.4 oz)   SpO2 99%   BMI 23.80 kg/m    Body mass index is 23.8 kg/m .  Physical Exam   General: Pleasant, in no apparent distress.  Eyes: Sclera are white and conjunctiva are clear bilaterally. Lacrimal apparatus free of erythema, edema, and discharge bilaterally.  Ears: External ears without erythema or edema.  Cardiovascular: Regular rate and rhythm with S1 equal to S2. No murmurs, friction rubs, or gallops.   Respiratory: Lungs are resonant and clear to auscultation bilaterally. No wheezes, crackles, or rhonchi.  Psych: Appropriate mood and affect.    Results for orders placed or performed in visit on 21   Hemoglobin A1c     Status: Abnormal   Result " Value Ref Range    Hemoglobin A1C 10.2 (H) 4.0 - 6.0 %   Comprehensive Metabolic Panel     Status: Abnormal   Result Value Ref Range    Sodium 131 (L) 134 - 144 mmol/L    Potassium 4.3 3.5 - 5.1 mmol/L    Chloride 97 (L) 98 - 107 mmol/L    Carbon Dioxide 26 21 - 31 mmol/L    Anion Gap 8 3 - 14 mmol/L    Glucose 378 (H) 70 - 105 mg/dL    Urea Nitrogen 18 7 - 25 mg/dL    Creatinine 0.83 0.70 - 1.30 mg/dL    GFR Estimate 89 >60 mL/min/[1.73_m2]    GFR Estimate If Black >90 >60 mL/min/[1.73_m2]    Calcium 9.2 8.6 - 10.3 mg/dL    Bilirubin Total 0.8 0.3 - 1.0 mg/dL    Albumin 4.1 3.5 - 5.7 g/dL    Protein Total 6.3 (L) 6.4 - 8.9 g/dL    Alkaline Phosphatase 49 34 - 104 U/L    ALT 10 7 - 52 U/L    AST 14 13 - 39 U/L

## 2021-06-14 NOTE — NURSING NOTE
Patient presents to clinic with concerns about elevated blood sugars. [facility] wants Blood sugar checked prior to rooming. Blood sugar was 340 when lab checked it.  Keli Ann LPN ....................  6/14/2021   9:56 AM

## 2021-06-21 NOTE — TELEPHONE ENCOUNTER
Patient's daughter requesting refill of Invokona. Has noticed improvement in blood sugars since starting. Averaging more in the 100-200's. [facility] not [facility] medication so is requesting a 30 day refill for [facility] pharmacy.     Ashanti South PA-C on 6/21/2021 at 1:44 PM

## 2021-06-26 PROBLEM — R50.9 FEVER, UNSPECIFIED FEVER CAUSE: Status: ACTIVE | Noted: 2021-01-01

## 2021-06-26 NOTE — ED TRIAGE NOTES
Pt arrives to triage via EMS after presenting stroke-like symptoms at home for the last hour. Last known well is 1230.. EMS reports pt had some right-sided deficits upon arrival and was very confused, A/O x1. Blood glucose 245 from EMS.     EMS Arrival Note  ________________________________  Miles COOPER Ji is a 81 year old Male that arrives via [facility]  Pre hospital clinical presentation per family member includes: see above.  Pre hospital personnel report vital signs of:  B/P 146/80; , RR 18;SpO2 94% on RA  Pre Hospital Cardiac rhythm reported as Tachycardia  Pre hospital care included: Medication: None:,Patient arrives with:  GCS Eye Opening = 4=Spontaneous  Airway intact  Breathing Assessment Normal  Circulation Assessment Normal  Patient arrives with a 18 gauge IV at his and her right arm with 0 ml of none infused upon arrival.    Placed in room 2, gowned, warm blanket provided, side rails up,  ID verified and band placed, and call light within reach.       Previous living situation Spouse and Children

## 2021-06-26 NOTE — ED NOTES
Patient daughter here with pt spouse.  Pt daughter is health care agent and is asking if she can be in room along with pt spouse.  Current visitor policy is only one visitor in room at a time with adults, visitors up to 2 can switch out.  This explained to Daughter.  This writer spoke with supervisor to confirm that healthcare agent does count as 1 visitor. Pt daughter verbalized understanding.  And is going to switch out with pt spouse.   Will keep updating daughter as able.

## 2021-06-26 NOTE — H&P
[facility]    History and Physical - Hospitalist Service       Date of Admission:  6/26/2021    Assessment & Plan      Vikas Ji is a 81 year old male admitted on 6/26/2021. He presented with complaint of weakness and confusion.    Fever  Associated with confusion and generalized weakness.  Tmax 103. 4 F with no clear infectious etiology.  Remainder of vital signs within normal limits, no leukocytosis, no nitrites or leukocyte esterase on urinalysis, no acute abnormalities on CXR or head CT, and he has no specific symptoms.  Mental status improved with initial IV fluids and antibiotics.  - Blood and urine cultures pending.  - Lyme and Ehrlichia pending.  - Received sepsis bolus.  Continue MIVF.  - Continue broad-spectrum antibiotics with Vancomycin, Zosyn, and Doxycycline.  - Repeat CXR tomorrow morning.  - MRI ordered, though this cannot be obtained until Monday.  - Monitor on telemetry and pulse oximetry.  - Tylenol and Ibuprofen as needed for pain/fever.    Type II Diabetes Mellitus  Poorly controlled, Hgb A1c 10.2% on 6/14/21.  His daughter reports that he and his wife cannot manage insulin appropriately, so attempting to obtain control with oral medications.  - Hold Metformin and Invokana while inpatient.  - Sliding scale insulin and POC glucose monitoring while inpatient.  - Hypoglycemia protocol.    Hypertension  Normotensive  - Hold home Losartan 100 mg daily.    Hyperlipidemia  - Hold statin medication due to potential cognitive effects.    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  Last appointment with oncology was in September 2020.  CT chest and abdomen/pelvis completed in April 2021 revealing new 2.2 cm nodular focus appearing to consist of interstitial thickening and atelectasis in posterior and medial aspect of left lower lobe and stable chronic findings.  - Repeat CXR after hydration as above.  Could consider repeat CT chest.  - Continue PPI.      "  Diet: Consistent Carbohydrate Diet 6587-2656 Calories: Moderate Consistent CHO (4-6 CHO units/meal)    DVT Prophylaxis: Pneumatic Compression Devices  Plascencia Catheter: Not present  Central Lines: None  Code Status: Full Code      Disposition Plan   Expected discharge: 2 - 3 days, recommended to prior living arrangement once antibiotic plan established, mental status at baseline and SIRS/Sepsis treated.     The patient's care was discussed with the Patient and Patient's Family.    Shante Bay,   [facility]  ______________________________________________________________________    Chief Complaint   Weakness, confusion    History obtained from the patient, his wife, and his daughter    History of Present Illness   Vikas Ji is a 81 year old male with past medical history of dementia, type II diabetes mellitus, hyperlipidemia, and adenocarcinoma of distal esophagus S/P palliative radiation who presented with weakness and confusion.  Per report from his wife and daughter, his symptoms came on suddenly around lunch when he began shaking and appeared weak and confusion.  They promptly brought him to the emergency department to be evaluated.  Work-up was significant for fever, Tmax 103.4, mild hyponatremia, hyperglycemia, hematuria, and CXR and head CT without acute abnormalities.  He received sepsis bolus fluids and was started on broad-spectrum antibiotics with subsequent improvement in his mental status.  His wife feels that he is back at his mental baseline though his daughter believes he is still somewhat confused.  His dementia has been progressing, and he has struggled to perform house and yard work like he used to.  He is dependent upon his wife for medication management and requires prompting from her to eat and drink.  He does not always remember names, frequently referring to his son as \"that boy down the street.\"  His daughter has been looking into assisted living placement for " both of her parents.  They are currently on waiting lists and due for some meet-and-greet visits soon.  He is hard of hearing and has only one hearing aid with him.    Review of Systems    Review of Systems   Constitutional: Positive for fever. Negative for chills and fatigue.   HENT: Negative for congestion, rhinorrhea and sore throat.    Eyes: Negative for photophobia and visual disturbance.   Respiratory: Negative for cough and shortness of breath.    Cardiovascular: Negative for chest pain and palpitations.   Gastrointestinal: Negative for abdominal pain, blood in stool, constipation, diarrhea, nausea and vomiting.   Genitourinary: Negative for dysuria, frequency and urgency.   Musculoskeletal: Negative for arthralgias and neck pain.   Skin: Negative for rash.   Neurological: Positive for weakness. Negative for dizziness, seizures, syncope, speech difficulty, light-headedness, numbness and headaches.   Psychiatric/Behavioral: Positive for confusion.     Past Medical History    I have reviewed this patient's medical history and updated it with pertinent information if needed.   Past Medical History:   Diagnosis Date     Dementia (H)      HLD (hyperlipidemia)      HTN (hypertension)      Hypomagnesemia     9/10/2015     Other abnormalities of gait and mobility     10/6/2015     Type 2 diabetes mellitus with hyperglycemia (H)     9/10/2015     Past Surgical History   I have reviewed this patient's surgical history and updated it with pertinent information if needed.  Past Surgical History:   Procedure Laterality Date     COLONOSCOPY  09/27/2010 2010,F/U 2020  Ulcerative colitis     COLONOSCOPY N/A 6/30/2020    Procedure: COLONOSCOPY, WITH POLYPECTOMY AND BIOPSY;  Surgeon: Eliseo Arshad MD;  Location:  OR     ESOPHAGOSCOPY, GASTROSCOPY, DUODENOSCOPY (EGD), COMBINED N/A 6/30/2020    Procedure: ESOPHAGOGASTRODUODENOSCOPY, WITH BIOPSY;  Surgeon: Eliseo Arshad MD;  Location:  OR     Social  History   I have reviewed this patient's social history and updated it with pertinent information if needed.  Social History     Tobacco Use     Smoking status: Former Smoker     Packs/day: 0.60     Years: 30.00     Pack years: 18.00     Types: Cigarettes     Quit date: 1987     Years since quittin.5     Smokeless tobacco: Current User     Types: Chew   Substance Use Topics     Alcohol use: Yes     Alcohol/week: 10.0 standard drinks     Types: 10 Cans of beer per week     Frequency: 4 or more times a week     Drinks per session: 1 or 2     Comment: 0-2 beer per week     Drug use: No     Family History   I have reviewed this patient's family history and updated it with pertinent information if needed.  Family History   Problem Relation Age of Onset     No Known Problems Mother      No Known Problems Father      Throat cancer Maternal Grandmother      Prior to Admission Medications   Prior to Admission Medications   Prescriptions Last Dose Informant Patient Reported? Taking?   Multiple Vitamin (MULTI-VITAMINS) TABS 2021 at Unknown time  Yes Yes   Sig: Take 1 tablet by mouth daily   OVER-THE-COUNTER 2021 at Unknown time  Yes Yes   Sig: MESELAMINE  Unknown strength, 4 capsule per day   aspirin 81 MG chewable tablet 2021 at Unknown time  Yes Yes   Sig: Take 81 mg by mouth once   calcium & magnesium carbonates 311-232 MG TABS 2021 at Unknown time  Yes Yes   Sig: Take 1 tablet by mouth daily   canagliflozin (INVOKANA) 100 MG tablet   No No   Sig: Take 1 tablet (100 mg) by mouth every morning (before breakfast)   canagliflozin (INVOKANA) 100 MG tablet More than a month at Unknown time  No No   Sig: Take 1 tablet (100 mg) by mouth every morning (before breakfast)   cyanocobalamin (VITAMIN B-12) 1000 MCG tablet 2021 at Unknown time  Yes Yes   Sig: Take 1 tablet (1,000 mcg) by mouth daily   glipiZIDE (GLUCOTROL) 10 MG tablet 2021 at Unknown time  No Yes   Sig: Take 1 tablet (10 mg) by  mouth 2 times daily (before meals) In AM and PM -- and add 1/2 tablet daily with Lunch.   losartan (COZAAR) 100 MG tablet 6/26/2021 at Unknown time  No Yes   Sig: Take 1 tablet (100 mg) by mouth daily   metFORMIN (GLUCOPHAGE) 1000 MG tablet 6/26/2021 at Unknown time  No Yes   Sig: Take 1 tablet (1,000 mg) by mouth 2 times daily (with meals)   omeprazole (PRILOSEC OTC) 20 MG EC tablet More than a month at Unknown time  No No   Sig: Take 1 tablet (20 mg) by mouth daily   omeprazole (PRILOSEC) 20 MG DR capsule 6/26/2021 at Unknown time  No Yes   Sig: Take 1 capsule (20 mg) by mouth daily   ondansetron (ZOFRAN) 4 MG tablet More than a month at Unknown time  No No   Sig: Take 1 tablet (4 mg) by mouth every 8 hours as needed for nausea   simvastatin (ZOCOR) 40 MG tablet 6/25/2021 at Unknown time  No Yes   Sig: Take 1 tablet (40 mg) by mouth At Bedtime      Facility-Administered Medications: None     Allergies   No Known Allergies    Physical Exam   Vital Signs: Temp: 100.1  F (37.8  C) Temp src: Tympanic BP: 113/59 Pulse: 82   Resp: 18 SpO2: 97 % O2 Device: None (Room air)    Weight: 155 lbs 6.79 oz  Physical Exam  Constitutional:       General: He is not in acute distress.     Appearance: Normal appearance. He is not ill-appearing.   HENT:      Head: Normocephalic and atraumatic.      Right Ear: There is impacted cerumen.      Left Ear: Tympanic membrane normal.      Nose: Nose normal. No congestion or rhinorrhea.      Mouth/Throat:      Mouth: Mucous membranes are dry.      Pharynx: Oropharynx is clear. No oropharyngeal exudate or posterior oropharyngeal erythema.   Eyes:      General:         Right eye: No discharge.         Left eye: No discharge.      Pupils: Pupils are equal, round, and reactive to light.      Comments: Some difficulty tracking to left, EOMI otherwise intact   Neck:      Musculoskeletal: Neck supple. No neck rigidity or muscular tenderness.   Cardiovascular:      Rate and Rhythm: Normal rate and  regular rhythm.      Pulses:           Radial pulses are 2+ on the right side and 2+ on the left side.        Dorsalis pedis pulses are 2+ on the right side and 2+ on the left side.      Heart sounds: No murmur. No friction rub. No gallop.    Pulmonary:      Effort: Pulmonary effort is normal.      Breath sounds: Normal breath sounds. No wheezing, rhonchi or rales.   Abdominal:      General: Bowel sounds are normal.      Palpations: Abdomen is soft.      Tenderness: There is no abdominal tenderness. There is no guarding or rebound.   Musculoskeletal:         General: No swelling.   Lymphadenopathy:      Cervical: No cervical adenopathy.   Skin:     General: Skin is warm and dry.      Capillary Refill: Capillary refill takes less than 2 seconds.   Neurological:      Mental Status: He is alert.      Comments: Some confusion but follows directions. EOMI as documented, remainder of cranial nerves intact. No pronator drift. Normal heel-shin. Touches base of finger with left hand on finger-to-nose testing. Normal finger-to-nose testing on right. Oriented to person.   Psychiatric:         Mood and Affect: Mood normal.       Data   Data reviewed today: I reviewed all medications, new labs and imaging results over the last 24 hours. I personally reviewed the head CT image(s) showing no acute bleeding.    Recent Labs   Lab 06/26/21  1420   WBC 8.7   HGB 13.1*   MCV 91   *   INR 1.11   *   POTASSIUM 3.9   CHLORIDE 100   CO2 21   BUN 20   CR 0.81   ANIONGAP 12   JOSELITO 9.4   *   ALBUMIN 4.4   PROTTOTAL 6.8   BILITOTAL 1.3*   ALKPHOS 51   ALT 39   AST 31

## 2021-06-26 NOTE — PHARMACY-VANCOMYCIN DOSING SERVICE
Pharmacy Consult- Vancomycin Assessment    Vikas Ji is a 81 year old male admitted on 2021.    Vancomycin has been ordered per MD, for the indication of: sepsis    Current Antibiotic Regimen Includes: vancomycin and Zosyn    Patient Active Problem List   Diagnosis     Balance problems     Cough     Controlled type 2 diabetes mellitus without complication, with long-term current use of insulin (H)     Diverticulosis of large intestine     Generalized weakness     History of tobacco use     Inguinal hernia, right     Low blood magnesium level     Personal history of other diseases of digestive system     Benign essential hypertension     Mixed hyperlipidemia     Vitamin B12 deficiency     Memory problem     Chewing tobacco use     Change in bowel habits     Other dysphagia     Difficulty urinating     Adenocarcinoma of esophagus (H)     Dehydration     Hyponatremia     Fever, unspecified fever cause       Allergies (and reaction): Patient has no known allergies.    Most recent flowsheet Weight: 70.5 kg (155 lb 6.8 oz)  Most recent flowsheet      No intake or output data in the 24 hours ending 21 1836    Tmax = Temp (24hrs), Av.7  F (38.2  C), Min:99  F (37.2  C), Max:103.4  F (39.7  C)      Recent Labs   Lab Test 21  1420   WBC 8.7       Recent Labs   Lab Test 21  1420 21  1020 21  1239   BUN 20 18 23   CR 0.81 0.83 0.87       estimated creatinine clearance is 70.1 mL/min (based on SCr of 0.81 mg/dL).    Cultures Pending: Blood x 2     Culture Results: pending    Plan: Continue vancomycin 1500 mg IV Q24H starting after initial load given in ER (1750 mg 21 1718). Estimated steady state AUC ~ 501 mg/L.hr. Per the Restricted Antimicrobial Disease State Guidelines Vancomycin has been ordered as empiric therapy allowing for 48 hour coverage based on anticipated pharmacokinetics. It will not be continued past 48 hours without a new order placed by the physician after  compelling evidence that it is needed.    Regimen Start Date: 6/26/21  Recommended Dose: 1750 mg loading dose followed by 1500 mg IV Q24H, which provides 21.5 mg/kg/dose  Interval:Q24H  Goal AUC: 400-600 mg/L.hr    Thank You for the consult. Will continue to follow.    Marcy Stroud [facility] ....................  6/26/2021   6:36 PM

## 2021-06-26 NOTE — ED PROVIDER NOTES
[facility]  Emergency Department Visit Note    Altered Mental Status      History of Present Illness     HPI:  Vikas Ji is a 81 year old male presenting with increased weakness an dconfusion. These symptoms were noted about one hour ago after waking from a nap ago and have not changed. On EMS arrival he was noted to be febrile with heart rate in the 120s. The patient has not been exposed to sick contacts with similar symptoms. He is able to tolerate oral intake including plenty of fluids. There is not cough or purulent sputum and there no chest pain. Fever is present. No abdominal pain, nausea, vomiting, rash. No known history of immunosuppression.    Medications:  Previous Medications    ASPIRIN 81 MG CHEWABLE TABLET    Take 81 mg by mouth once    CALCIUM & MAGNESIUM CARBONATES 311-232 MG TABS    Take 1 tablet by mouth daily    CANAGLIFLOZIN (INVOKANA) 100 MG TABLET    Take 1 tablet (100 mg) by mouth every morning (before breakfast)    CANAGLIFLOZIN (INVOKANA) 100 MG TABLET    Take 1 tablet (100 mg) by mouth every morning (before breakfast)    CYANOCOBALAMIN (VITAMIN B-12) 1000 MCG TABLET    Take 1 tablet (1,000 mcg) by mouth daily    GLIPIZIDE (GLUCOTROL) 10 MG TABLET    Take 1 tablet (10 mg) by mouth 2 times daily (before meals) In AM and PM -- and add 1/2 tablet daily with Lunch.    LOSARTAN (COZAAR) 100 MG TABLET    Take 1 tablet (100 mg) by mouth daily    METFORMIN (GLUCOPHAGE) 1000 MG TABLET    Take 1 tablet (1,000 mg) by mouth 2 times daily (with meals)    MULTIPLE VITAMIN (MULTI-VITAMINS) TABS    Take 1 tablet by mouth daily    OMEPRAZOLE (PRILOSEC OTC) 20 MG EC TABLET    Take 1 tablet (20 mg) by mouth daily    OMEPRAZOLE (PRILOSEC) 20 MG DR CAPSULE    Take 1 capsule (20 mg) by mouth daily    ONDANSETRON (ZOFRAN) 4 MG TABLET    Take 1 tablet (4 mg) by mouth every 8 hours as needed for nausea    OVER-THE-COUNTER    MESELAMINE  Unknown strength, 4 capsule per day    SIMVASTATIN  (ZOCOR) 40 MG TABLET    Take 1 tablet (40 mg) by mouth At Bedtime       Allergies:  No Known Allergies    Problem List:  Patient Active Problem List   Diagnosis     Balance problems     Cough     Controlled type 2 diabetes mellitus without complication, with long-term current use of insulin (H)     Diverticulosis of large intestine     Generalized weakness     History of tobacco use     Inguinal hernia, right     Low blood magnesium level     Personal history of other diseases of digestive system     Benign essential hypertension     Mixed hyperlipidemia     Vitamin B12 deficiency     Memory problem     Chewing tobacco use     Change in bowel habits     Other dysphagia     Difficulty urinating     Adenocarcinoma of esophagus (H)     Dehydration     Hyponatremia     Fever, unspecified fever cause       Past Medical History:  Past Medical History:   Diagnosis Date     Dementia (H)      HLD (hyperlipidemia)      HTN (hypertension)      Hypomagnesemia     9/10/2015     Other abnormalities of gait and mobility     10/6/2015     Type 2 diabetes mellitus with hyperglycemia (H)     9/10/2015       Past Surgical History:  Past Surgical History:   Procedure Laterality Date     COLONOSCOPY  2010,F/U   Ulcerative colitis     COLONOSCOPY N/A 2020    Procedure: COLONOSCOPY, WITH POLYPECTOMY AND BIOPSY;  Surgeon: Eliseo Arshad MD;  Location:  OR     ESOPHAGOSCOPY, GASTROSCOPY, DUODENOSCOPY (EGD), COMBINED N/A 2020    Procedure: ESOPHAGOGASTRODUODENOSCOPY, WITH BIOPSY;  Surgeon: Eliseo Arshad MD;  Location:  OR       Social History:  Social History     Tobacco Use     Smoking status: Former Smoker     Packs/day: 0.60     Years: 30.00     Pack years: 18.00     Types: Cigarettes     Quit date: 1987     Years since quittin.5     Smokeless tobacco: Current User     Types: Chew   Substance Use Topics     Alcohol use: Yes     Alcohol/week: 10.0 standard drinks     Types: 10  Cans of beer per week     Frequency: 4 or more times a week     Drinks per session: 1 or 2     Comment: 0-2 beer per week     Drug use: No       Review of Systems:  Unable to obtain due to MS change, h/o dementia      Physical Exam       Vital signs: /64   Pulse 110   Temp 101.3  F (38.5  C) (Tympanic)   Resp 17   Wt 70.5 kg (155 lb 6.8 oz)   SpO2 98%   BMI 23.81 kg/m      Physical Exam:    General: awake and intermittently confused but redirectable, no respiratory distress  HEENT: atraumatic, no scleral injection, no nasal discharge, neck supple  Chest: clear to auscultation bilaterally without wheezes or crackles, tachypnea and labored respirations, symmetric chest rise  Cardiovascular: regular rate and rhythm, no murmurs or gallops  Abdomen: soft, nontender, no rebound or guarding, nondistended  Extremities: no deformities, edema, or tenderness  Skin: warm, dry, no rashes  Neuro: alert and oriented x 3, moving extremities x 4, NIHSS 0    Medical Decision Making & ED Course     Differential includes SIRS  with unknown source at this time, metabolic or toxic encephalopathy, Given the patient's age, clinical appearance and exam with co-morbidities, this patient's presentation is concerning for Sepsis with fever, tachycardia, AMS. Neuro exam is normal, NIHSS 0. Chest x-ray negative for pneumonia and laboratory studies are reassuring with negative procalcitonin, lactate, nl WBC. Given that the patient has no obvious source, will treat with zosyn antibiotics.  The case discussed with the hospitalist on call, Dr. Bay, who agrees to accept this admission. Will add Lyme and ehrlichia panel    I have reviewed the patient's Lab(s), Medical Imaging and Medical Records.    Diagnosis & Disposition     Diagnosis:  1. Fever, unspecified fever cause        Disposition:  Admit    MD Bessy Arthur Theresa M, MD  06/26/21 [id number]

## 2021-06-26 NOTE — PHARMACY-VANCOMYCIN DOSING SERVICE
Pharmacy Consult- Vancomycin Assessment    Vikas Ji is a 81 year old male admitted on 2021.    Vancomycin has been ordered per MD, for the indication of: sepsis    Current Antibiotic Regimen Includes: vancomycin and Zosyn    Patient Active Problem List   Diagnosis     Balance problems     Cough     Controlled type 2 diabetes mellitus without complication, with long-term current use of insulin (H)     Diverticulosis of large intestine     Generalized weakness     History of tobacco use     Inguinal hernia, right     Low blood magnesium level     Personal history of other diseases of digestive system     Benign essential hypertension     Mixed hyperlipidemia     Vitamin B12 deficiency     Memory problem     Chewing tobacco use     Change in bowel habits     Other dysphagia     Difficulty urinating     Adenocarcinoma of esophagus (H)     Dehydration     Hyponatremia       Allergies (and reaction): Patient has no known allergies.    Most recent flowsheet Weight: 70.5 kg (155 lb 6.8 oz)  Most recent flowsheet      No intake or output data in the 24 hours ending 21 1632    Tmax = Temp (24hrs), Av.4  F (39.1  C), Min:101.3  F (38.5  C), Max:103.4  F (39.7  C)      Recent Labs   Lab Test 21  1420   WBC 8.7       Recent Labs   Lab Test 21  1420 21  1020 21  1239   BUN 20 18 23   CR 0.81 0.83 0.87       estimated creatinine clearance is 70.1 mL/min (based on SCr of 0.81 mg/dL).    Cultures Pending: Blood x 2     Culture Results: all pending    Plan: Give vancomycin 1750 mg IV one time for one dose 21. Further dosing per hospitalist if admitted.     Regimen Start Date: 21  Recommended Dose: 1750 mg, which provides 24.8 mg/kg/dose  Interval: one time ER order    Thank You for the consult. Will continue to follow.    Marcy Stroud [facility] ....................  2021   4:32 PM

## 2021-06-26 NOTE — PROGRESS NOTES
[facility] ADMISSION NOTE    Patient admitted to room 301 at approximately 1750 via cart from emergency room. Patient was accompanied by daughter.     Verbal SBAR report received from HARSH Mehta prior to patient arrival.     Patient trasferred to bed via slipp Patient alert and oriented X 1. The patient is not having any pain.  . Admission vital signs: Blood pressure 91/47, pulse 84, temperature 99  F (37.2  C), temperature source Tympanic, resp. rate 20, weight 70.5 kg (155 lb 6.8 oz), SpO2 95 %. Patient was oriented to plan of care, call light, bed controls, tv, telephone, bathroom and visiting hours.     Risk Assessment    The following safety risks were identified during admission: fall. Yellow risk band applied: YES.     Skin Initial Assessment    This writer admitted this patient and completed a full skin assessment and Vinayak score in the Adult PCS flowsheet. Appropriate interventions initiated as needed.            Education    Patient has a [place] to Observation order: Yes  Observation education completed and documented: Yes      Catalina Johnson RN

## 2021-06-27 NOTE — PHARMACY-VANCOMYCIN DOSING SERVICE
"Pharmacy Vancomycin Note  Date of Service 2021  Patient's  1940   81 year old, male    Indication: Sepsis  Day of Therapy: 2  Current vancomycin regimen:  1500 mg IV q24h, after 1750 mg load x1  Current vancomycin monitoring method: AUC  Current vancomycin therapeutic monitoring goal: 400-600 mg*h/L    Current estimated CrCl = Estimated Creatinine Clearance: 75.3 mL/min (based on SCr of 0.75 mg/dL).    Creatinine for last 3 days  2021:  2:20 PM Creatinine 0.81 mg/dL  2021:  5:40 AM Creatinine 0.75 mg/dL    Recent Vancomycin Levels (past 3 days)  No results found for requested labs within last 72 hours.    Vancomycin IV Administrations (past 72 hours)                   vancomycin (VANCOCIN) 1,750 mg in sodium chloride 0.9 % 500 mL intermittent infusion (mg) 1,750 mg New Bag 21 1718                Nephrotoxins and other renal medications (From now, onward)    Start     Dose/Rate Route Frequency Ordered Stop    21 1800  vancomycin (VANCOCIN) 1,500 mg in sodium chloride 0.9 % 500 mL intermittent infusion      1,500 mg  over 90 Minutes Intravenous EVERY 24 HOURS 21 1835 21 1759    21 2200  piperacillin-tazobactam (ZOSYN) intermittent infusion 4.5 g     Note to Pharmacy: For [facility], [facility] and [facility]: For Zosyn-naive patients, use the \"Zosyn initial dose + extended infusion\" order panel.    4.5 g  200 mL/hr over 30 Minutes Intravenous EVERY 6 HOURS 21 1822      21 182  ibuprofen (ADVIL/MOTRIN) tablet 600 mg      600 mg Oral EVERY 6 HOURS PRN 21 1822               Contrast Orders - past 72 hours (72h ago, onward)    None        Has serum creatinine changed greater than 50% in last 72 hours: No    Urine output:  improving    Renal Function: Stable    Loading dose: (Received 1750 mg 6/26/21 x1 in ED) Then  Regimen: 1500 mg IV every 24 hours.  Start time: 17:18 on 2021  Exposure target: AUC24 (range)400-600 mg/L.hr   AUC24,ss: 474 mg/L.hr  Probability of " AUC24 > 400: 69 %  Ctrough,ss: 12.8 mg/L  Probability of Ctrough,ss > 20: 17 %  Probability of nephrotoxicity (Lodise MELISA 2009): 8 %    Plan:  1. Continue Current Dose  2. Vancomycin monitoring method: AUC  3. Vancomycin therapeutic monitoring goal: 400-600 mg*h/L  4. Pharmacy will check vancomycin levels as appropriate in only if continued past 48 hours.  5. Serum creatinine levels will be ordered daily for the first week of therapy and at least twice weekly for subsequent weeks.    Jesica Brizuela RP  Jesica Brizuela RPH on 6/27/2021 at 9:16 AM

## 2021-06-27 NOTE — PLAN OF CARE
Alert and oriented to self. Irritable at times, insists that he needs to go home. Is redirectable. Neuros are intact, word finding difficulty present. Afebrile. Family has been at bedside. Ls clear/dim. BS active. Assist of 1 with walker and gait belt. IV patent infusing Nacl @ 125 mL/hr.     /60   Pulse 91   Temp 97.5  F (36.4  C) (Tympanic)   Resp 18   Wt 70.1 kg (154 lb 8 oz)   SpO2 99%   BMI 23.67 kg/m

## 2021-06-27 NOTE — PROGRESS NOTES
SAFETY CHECKLIST  ID Bands and Risk clasps correct and in place (DNR, Fall risk, Allergy, Latex, Limb):  Yes  All Lines Reconciled and labeled correctly: Yes  Whiteboard updated:Yes  Environmental interventions (bed/chair alarm on, call light, side rails, restraints, sitter....): Yes  Verify Tele #: [id number]

## 2021-06-27 NOTE — PHARMACY-ADMISSION MEDICATION HISTORY
"Pharmacy -- Admission Medication Reconciliation    Prior to admission (PTA) medications were reviewed and the patient's PTA medication list was updated.    Sources Consulted: Bethanie Pretty, daughter on telephone  [facility], clinic notes June 2021    The reliability of this Medication Reconciliation is: Reliability: Borderline reliable    The following significant changes were made:  Updated last doses per daughter  Removed notes from [facility]  Removed duplicate canagliflozin (I [facility] to [facility] and [facility] to [facility])  Reentered metformin increased to tid, changed back to bid per [facility] and [facility] from 6/2021  Removed ondansetron  Removed duplicated omeprazole  Reentered mesalamine as 2  Bid per daughter, reentered mesalamine with proper dosing from [facility]  Added daily to asa    In addition, the patient's allergies were reviewed with the patient's daughter and updated as follows:   Allergies: Patient has no known allergies.    The pharmacist has reviewed with the patient's daughter that all personal medications should be removed from the building or locked in the belongings safe.  Patient shall only take medications ordered by the physician and administered by the nursing staff.       Medication barriers identified: none noted, daughter coordinates, daughter discussed they are unable to manage insulin due to \"double dosing\"   Medication adherence concerns: daughter is nurse and coordinates care   Understanding of emergency medications: no glucose    Jesica Brizuela RPH, 6/27/2021,  2:14 PM     Updated doses of metformin and mesalamine [facility] and Dr[facility] Wilcox clinic notes  Updated asa to daily  Jesica Brizuela RPH on 6/28/2021 at 11:48 AM  Jesica Brizuela RPH on 6/28/2021 at 11:50 AM        "

## 2021-06-27 NOTE — PROGRESS NOTES
06/27/21 1000   Quick Adds   Type of Visit Initial PT Evaluation   Living Environment   People in home spouse   Current Living Arrangements house   Home Accessibility   (pt not able to provide information; no family present )   Living Environment Comments Assist from spouse to manage dementia, direct activity   Disability/Function   Describe hearing loss bilateral hearing loss   Vision Management wears glasses   Concentrating, Remembering or Making Decisions Difficulty yes   Cognition   Orientation Status (Cognition) disoriented to;place;situation;time   Affect/Mental Status (Cognition) confused;other (see comments)  (impulsive)   Bed Mobility   Comment (Bed Mobility) SBA for bed mobility with verbal cues for safety   Transfers   Transfers sit-stand transfer   Sit-Stand Transfer   Sit-Stand [place] (Transfers) contact guard   Sit/Stand Transfer Comments Verbal cues for safety, limited ability to follow commands, impulsive   Gait/Stairs (Locomotion)   [place] Level (Gait) minimum assist (75% patient effort)  (CGA in general but intermittent min A with impulsiveness)   Assistive Device (Gait) walker, front-wheeled;other (see comments)  (or no device with hand held, min assist and verbal cues)   Distance in Feet (Required for LE Total Joints) 25 feet X 1, 15 feet X 2   Pattern (Gait) step-through   Comment (Gait/Stairs) Moves well but needs assist for safety purposes, limited ability to follow commands, impulsive with gait.   Balance   Balance Comments fair to good standing balance; good sitting balance   Clinical Impression   Criteria for Skilled Therapeutic Intervention yes, treatment indicated   PT Diagnosis (PT) impaired mobility   Influenced by the following impairments cognition, decreased endurance, weakness   Functional limitations due to impairments Safety, fall risk, ambulation, transfers   Clinical Presentation Stable/Uncomplicated   Clinical Decision Making (Complexity) low complexity   Therapy  Frequency (PT) Daily   Predicted Duration of Therapy Intervention (days/wks) length of stay   Planned Therapy Interventions (PT) balance training;bed mobility training;gait training;strengthening;transfer training   Risk & Benefits of therapy have been explained   (no family present at time of eval)   PT Discharge Planning    PT Discharge Recommendation (DC Rec) home with assist   PT Rationale for DC Rec Pt will likely be at baseline for physical abilitites at time of discharge. Question if cognition is at baseline or declined related to current health status. Physical mobility skills are approaching functional; needs assist/direction due to cognitive deficits.    PT Brief overview of current status  Pt requires CGA with frequent min assist for safe mobility skills, primarily limited by cognitive deficits. Impulsiveness increases fall risk with ambulation and transfers. Ambulated both with FWW and without device in room.    Serena Hendricks, PT on 6/27/2021 at 11:07 AM

## 2021-06-27 NOTE — PLAN OF CARE
/59   Pulse 98   Temp 98.8  F (37.1  C) (Tympanic)   Resp 18   Wt 70.1 kg (154 lb 8 oz)   SpO2 97%   BMI 23.67 kg/m      Afebrile after prn antiemetic, VS.  Increased confusion/tremor with highest temp of 100.6.  Pt continues to be impulsive when getting out of bed.  Sitter at bedside to prevent injury to self.  Disoriented to time/place/situation, easily redirected.  Denies pain and sob.  Neuro remain intact.  Gayla Whitfield RN.............................6/27/2021 2:31 AM

## 2021-06-27 NOTE — PROGRESS NOTES
Daughter declined need to watch observation video.   Catalina Johnson RN on 6/26/2021 at 7:20 PM

## 2021-06-27 NOTE — PROGRESS NOTES
06/27/21 1051   Quick Adds   Type of Visit Initial Occupational Therapy Evaluation   Living Environment   People in home spouse   Living Environment Comments wife manages pts dementia at home; med management, shopping, meals   Self-Care   Usual Activity Tolerance moderate   Current Activity Tolerance fair   Disability/Function   Hearing Difficulty or Deaf yes   Patient's preferred means of communication verbal   Describe hearing loss bilateral hearing loss   Wear Glasses or Blind yes   Vision Management wears glasses   Concentrating, Remembering or Making Decisions Difficulty yes   Difficulty Communicating no   Difficulty Eating/Swallowing no   Toileting issues no   Doing Errands Independently Difficulty (such as shopping) no   Fall history within last six months no   General Information   Additional Occupational Profile Info/Pertinent History of Current Problem at baseline pt does not require physical assist, able to ambulate without assistive device. he requires cognitive assist due to dementia   General Observations and Info pleasant, not oriented, impulsive but redirects with verbal instruction   Cognitive Status Examination   Orientation Status person   Affect/Mental Status (Cognitive) confused   Follows Commands follows one-step commands;50-74% accuracy   Safety Deficit severe deficit   Memory Deficit severe deficit   Attention Deficit distractible in noisy environment   Cognitive Status Comments advanced dementia; needs 24 hour supervision for safety   Pain Assessment   Patient Currently in Pain No   Range of Motion Comprehensive   General Range of Motion bilateral upper extremity ROM WFL   Bed Mobility   Bed Mobility supine-sit   Supine-Sit [place] (Bed Mobility) modified independence   Transfers   Transfers bed-chair transfer;sit-stand transfer   Transfer Skill: Bed to Chair/Chair to Bed   Bed-Chair [place] (Transfers) contact guard;minimum assist (75% patient effort)   Transfer Comments cues  needed for safety; assist to manage IV/equipment   Sit-Stand Transfer   Sit-Stand [place] (Transfers) contact guard   Sit/Stand Transfer Comments pt setting off alarms, unaware    Clinical Impression   Criteria for Skilled Therapeutic Interventions Met (OT) yes   OT Diagnosis impaired self cares, cognitive deficits   Assessment of Occupational Performance 1-3 Performance Deficits   Identified Performance Deficits self cares, mobility   Planned Therapy Interventions (OT) progressive activity/exercise   Clinical Decision Making Complexity (OT) low complexity   Therapy Frequency (OT) Daily   Predicted Duration of Therapy 1-2 days   Risk & Benefits of therapy have been explained evaluation/treatment results reviewed   OT Discharge Planning    OT Discharge Recommendation (DC Rec) Home with assist   OT Rationale for DC Rec pt will likely be at baseline level of function at DC and will be able to return home with spouse; assist needed for basic self cares due to cognitive impairments    OT Brief overview of current status  pt requires constant supervision for safety due to cognitive deficits, impulsivitiy, lack of insight/judgement. Requires only CGA/min assist overall for all mobility and self care tasks.    Total Evaluation Time (Minutes)   Total Evaluation Time (Minutes) 15

## 2021-06-27 NOTE — PROGRESS NOTES
[facility]    Medicine Progress Note - Hospitalist Service       Date of Admission:  6/26/2021    Assessment & Plan           Vikas Ji is a 81 year old male admitted on 6/26/2021. He presented with complaint of weakness and confusion.    Fever  Associated with confusion and generalized weakness.  Tmax 103. 4 F with no clear infectious etiology.  Fever and weakness has since resolved, though he remains more confused from his baseline per family report.  CRP elevated at 84.  - Blood and urine cultures pending.  No growth to date.  - Lyme and Ehrlichia pending.  - Received sepsis bolus.  Continue MIVF.  - Continue broad-spectrum antibiotics with Vancomycin, Zosyn, and Doxycycline.  - Repeat CXR.  Consider repeat CT chest pending results.  - MRI ordered, will be completed tomorrow.  Ativan ordered for sedation.  - Continue to monitor on telemetry and pulse oximetry.  - Continue Tylenol and Ibuprofen as needed for pain/fever.    Hypocalcemia  Mild, Ca 8.2.  - Continue to monitor.    Type II Diabetes Mellitus  Poorly controlled, Hgb A1c 10.2% on 6/14/21.  His daughter reports that he and his wife cannot manage insulin appropriately, so attempting to obtain control with oral medications outpatient.  - Hold Metformin and Invokana while inpatient.  - Sliding scale insulin and POC glucose monitoring while inpatient.  - Hypoglycemia protocol.    Hypertension  Normotensive  - Hold home Losartan 100 mg daily.    Hyperlipidemia  - Hold statin medication due to potential cognitive effects.    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  Last appointment with oncology was in September 2020.  CT chest and abdomen/pelvis completed in April 2021 revealing new 2.2 cm nodular focus appearing to consist of interstitial thickening and atelectasis in posterior and medial aspect of left lower lobe and stable chronic findings.  - Repeat CXR as above.  Could consider repeat CT chest.  - Continue  PPI.     Diet: Consistent Carbohydrate Diet 7178-5704 Calories: Moderate Consistent CHO (4-6 CHO units/meal)    DVT Prophylaxis: Pneumatic Compression Devices  Plascencia Catheter: Not present  Central Lines: None  Code Status: Full Code      Disposition Plan   Expected discharge: 2 - 3 days, recommended to prior living arrangement once antibiotic plan established.     The patient's care was discussed with the Patient and Patient's Family.    Shante Bay DO  Hospitalist Service  [facility]  ______________________________________________________________________    Interval History   No acute events overnight.  He continues to be confused more than his baseline per family report.  He states that he feels well today.  He denies headache, neck pain, neck stiffness, chest pain, shortness of breath, abdominal pain, nausea, and vomiting.    Data reviewed today: I reviewed all medications, new labs and imaging results over the last 24 hours. I personally reviewed no images or EKG's today.    Physical Exam   Vital Signs: Temp: 97.5  F (36.4  C) Temp src: Tympanic BP: 124/60 Pulse: 91   Resp: 18 SpO2: 99 % O2 Device: None (Room air)    Weight: 154 lbs 8 oz  General Appearance: Alert. No acute distress.  Respiratory: RRR. Diminished breath sounds over lung base bilaterally.  Cardiovascular: RRR.  GI: Soft. Non-tender. Non-distended. Normal bowel sounds.  Extremities: No swelling or edema.  Neuro: Oriented to person. Confusion with following commands. Cranial nerves intact.    Data   Recent Labs   Lab 06/27/21  0540 06/26/21  1420   WBC 5.9 8.7   HGB 11.5* 13.1*   MCV 91 91   PLT 97* 128*   INR  --  1.11    133*   POTASSIUM 3.7 3.9   CHLORIDE 104 100   CO2 21 21   BUN 15 20   CR 0.75 0.81   ANIONGAP 9 12   JOSELITO 8.2* 9.4   * 294*   ALBUMIN  --  4.4   PROTTOTAL  --  6.8   BILITOTAL  --  1.3*   ALKPHOS  --  51   ALT  --  39   AST  --  31

## 2021-06-28 PROBLEM — C15.9 ADENOCARCINOMA OF ESOPHAGUS (H): Status: ACTIVE | Noted: 2020-07-02

## 2021-06-28 PROBLEM — Z79.899 NEED FOR PROPHYLACTIC CHEMOTHERAPY: Status: ACTIVE | Noted: 2021-01-01

## 2021-06-28 PROBLEM — Z87.891 PERSONAL HISTORY OF TOBACCO USE, PRESENTING HAZARDS TO HEALTH: Status: ACTIVE | Noted: 2021-01-01

## 2021-06-28 PROBLEM — E78.5 HYPERLIPIDEMIA, UNSPECIFIED HYPERLIPIDEMIA TYPE: Status: ACTIVE | Noted: 2021-01-01

## 2021-06-28 PROBLEM — F03.90 SENILE DEMENTIA, UNCOMPLICATED (H): Status: ACTIVE | Noted: 2021-01-01

## 2021-06-28 PROBLEM — Z11.52 ENCOUNTER FOR SCREENING LABORATORY TESTING FOR COVID-19 VIRUS: Status: ACTIVE | Noted: 2021-01-01

## 2021-06-28 PROBLEM — Z79.82 ENCOUNTER FOR LONG-TERM (CURRENT) USE OF ASPIRIN: Status: ACTIVE | Noted: 2021-01-01

## 2021-06-28 PROBLEM — G93.41 SEPTIC ENCEPHALOPATHY: Status: ACTIVE | Noted: 2021-01-01

## 2021-06-28 PROBLEM — E11.9 TYPE 2 DIABETES MELLITUS WITHOUT COMPLICATION, UNSPECIFIED WHETHER LONG TERM INSULIN USE (H): Status: ACTIVE | Noted: 2021-01-01

## 2021-06-28 PROBLEM — E83.42 HYPOMAGNESEMIA: Status: ACTIVE | Noted: 2021-01-01

## 2021-06-28 PROBLEM — I10 BENIGN ESSENTIAL HYPERTENSION: Status: ACTIVE | Noted: 2018-11-28

## 2021-06-28 PROBLEM — I10 ESSENTIAL HYPERTENSION, MALIGNANT: Status: ACTIVE | Noted: 2021-01-01

## 2021-06-28 NOTE — UTILIZATION REVIEW
Admission Status; Secondary Review Determination   Admission date:  6/26/2021  2:02 PM    Under the authority of the Utilization Management Committee, the utilization review process indicated a secondary review on the above patient. The review outcome is based on review of the medical records, discussions with staff, and applying clinical experience noted on the date of the review.     (x) Inpatient Status Appropriate - This patient's medical care is consistent with medical management for inpatient care and reasonable inpatient medical practice.     RATIONALE FOR DETERMINATION   81-year-old male with a history of dementia, hypertension, and diabetes was admitted on 6/26 with fever and confusion.  There was concern for tickborne illness, viral infection, or aspiration pneumonia.  He was started on vancomycin, Zosyn, and doxycycline.  Also agitated requiring a sitter so MRI planned.  This patient is complicated, has multiple ongoing issues, is high risk for clinical deterioration, has already required a 3-day hospitalization, and is appropriate to advance to inpatient status at the time of this review.  This was discussed with Dr. Downing.  The severity of illness, intensity of service provided, expected LOS and risk for adverse outcome make the care complex, high risk and appropriate for hospital admission.    At the time of admission with the information available to the attending physician more than 2 nights Hospital complex care was anticipated, based on patient risk of adverse outcome if treated as outpatient and complex care required.  Inpatient admission is appropriate based on the Medicare guidelines.      The information on this document is developed by the utilization review team in order for the business office to ensure compliance. This only denotes the appropriateness of proper admission status and does not reflect the quality of care rendered.   The definitions of Inpatient Status and Observation Status  used in making the determination above are those provided in the CMS Coverage Manual, Chapter 1 and Chapter 6, section 70.4.     Sincerely,   Russ Mchugh DO MPH   Physician Advisor  Utilization Review  [facility]

## 2021-06-28 NOTE — PROGRESS NOTES
:    Met with patient, patient's spouse Gail, and patient's daughter Nirmala. Nirmala would like patient to have SNF placement. Provided her with options and she selected [facility] (first choice) and [facility] (second choice).     Pt/family was given the Medicare Compare list for SNF, with associated star ratings to assist with choice for referrals/discharge planning Yes    Education was given to pt/family that star ratings are updated/maintained by Medicare and can be reviewed by visiting www.medicare.gov Yes    Faxed referrals to [facility] and [facility]. Called Gayla at  and notified her of referral. Gayla reports that they are unable to accept patients with confusion at this time, but can reevaluate closer to discharge. Called Chandni at [facility] and notified her of referral. Chandni reports that referral will be reviewed. Anticipated discharge in 2-3 days.      will continue to follow.     ROSS Davalos on 6/28/2021 at 1:37 PM

## 2021-06-28 NOTE — PLAN OF CARE
BP (!) 140/74   Pulse 75   Temp 97.7  F (36.5  C) (Tympanic)   Resp 16   Wt 70.3 kg (155 lb)   SpO2 96%   BMI 23.74 kg/m      VS.  Increased intermittent confusion throughout shift.  Pt became combative with staff.  MD notified, Ativan ordered and administered.  Decrease in behavior after prn administration for approximately 1 hr.  Pt had large urine incontinence with a PVR of approximately 529 ml.  During bladder scan pt complained of pain upon pressure to bladder.  A joaquin was placed with minimal difficulty, clots noted with return of urine, joaquin is now draining without problem.  Pt resting without behavior now.  Gayla Whitfield RN.............................6/28/2021 4:32 AM

## 2021-06-28 NOTE — PROGRESS NOTES
Patient is alert and orientated to self only. Hard of hearing, has left hearing aide. Denies pain and appears comfortable. Gets agitated at times and threatens staff, gave prn ativan and 1:1 sitter is present when family is not. Neuros remain unchanged, WDL. Lungs clear throughout. Heart regular, on tele. Cool lower extremities, pedal pulses present. Gave prune juice, prn senna and miralax for constipation, had multiple BMs this shift following prn administration. Plascencia patent, adequate output. Swallow eval recommendations for oral cares after eating and check for food pocketing, tolerating diet well. Scattered bruising. A1 with walker. VSS. Will continue to monitor.    /59   Pulse 84   Temp 97.5  F (36.4  C) (Tympanic)   Resp 16   Wt 70.3 kg (155 lb)   SpO2 98%   BMI 23.74 kg/m

## 2021-06-28 NOTE — PROGRESS NOTES
06/28/21 1300   Signing Clinician's Name / Credentials   Signing clinician's name / credentials George Marcus MPT   Quick Adds   Rehab Discipline PT   Quick Adds Mobility   Functional Transfer Training   Treatment Detail CGA for safety   Gait Training   Symptoms Noted During/After Treatment (Gait Training) fatigue   Distance in Feet (Required for LE Total Joints) 225   Treatment Detail ambulation in hallway   [place] Level (Gait Training) contact guard   Physical Assistance Level (Gait Training) 1 person + 1 person to manage equipment   Weight Bearing (Gait Training) full weight-bearing   Assistive Device (Gait Training) rolling walker   Therapeutic Activity   Treatment Detail SBA for bed mobilities   Additional Documentation   Rehab Comments decreased orientation; safety awareness; patient would benefit from assisted living environment   PT Plan continue PT   Total Session Time   Total Session Time (minutes) 25 minutes

## 2021-06-28 NOTE — PROGRESS NOTES
06/28/21 1300   General Information   Onset of Illness/Injury or Date of Surgery 06/26/21   Referring Physician Dr. Salina MD   Patient/Family Therapy Goal Statement (SLP) Pt's daughter would like pt to eat safely   Pertinent History of Current Problem Pt has history of esophageal cancer/chemo/radiation. Pt's daughter reports that pt had difficulty with swallowing post chemo/radiation years ago. See H&P for further info.    General Observations Pt upright in bed when therapist arrived. Pt able to follow simple directions for oral musculature exam. Pt missing bottom dentures but oral musculature otherwise intact and WFL. Pt daughter in room for the bedside swallow. Pt's daughter reporting history of dysphagia post esophageal cancer chemo/radiation. Pt's daughter reports that pt ha been missing dentures for over a month and that he is using gums to break down food. She stated he did okay with spaghetti and meatballs today but had a little difficulty with the meatballs. Pt trialing thin liquid, semi-solid, and pureed textures during bedside exam. Pt demonstrating delayed swallow on 1/3 liquid trials and a throat clear on 1/3 trials. On semi-solid trials, pt demonstrating increased mastication time, likely due to missing dentures, as well as a delayed swallow. Once cued to swallow, pt had appropriate laryngeal movement. On puree trial, pt holding food in mouth and requiring a cue to swallow.    Type of Evaluation   Type of Evaluation Swallow Evaluation   Dentition (Oral Motor)   Dentition (Oral Motor) dental appliance/dentures   Comment, Dentition (Oral Motor) Pt missing lower dentures   Dental Appliance/Denture (Oral Motor) upper and lower;not present during evaluation   Facial Symmetry (Oral Motor)   Facial Symmetry (Oral Motor) WNL   Lip Function (Oral Motor)   Lip Range of Motion (Oral Motor) WNL   Tongue Function (Oral Motor)   Tongue ROM (Oral Motor) WNL   Jaw Function (Oral Motor)   Jaw Function (Oral Motor)  WNL   Vocal Quality/Secretion Management (Oral Motor)   Vocal Quality (Oral Motor) WFL   General Swallowing Observations   Current Diet/Method of Nutritional Intake (General Swallowing Observations, NIS) dysphagia level 2 (mechanically altered);thin liquids   Respiratory Support (General Swallowing Observations) none   Swallowing Evaluation Clinical swallow evaluation   Clinical Swallow Evaluation   Feeding Assistance frequent cues/help required   Clinical Swallow Evaluation Textures Trialed Thin Liquids;Semi-Solid;Puree Textures   Clinical Swallow Eval: Thin Liquid Texture Trial   Mode of Presentation, Thin Liquids self-fed;straw   Volume of Liquid or Food Presented 2 oz.    Oral Phase of Swallow Poor AP movement   Pharyngeal Phase of Swallow throat clearing   Diagnostic Statement Pt holding water in mouth on 1/3 liquid trials and a throat clear on 1/3 trials.    Clinical Swallow Evaluation: Puree Solid Texture Trial   Mode of Presentation, Puree spoon;fed by clinician   Volume of Puree Presented 1/2 oz.   Oral Phase, Puree Poor AP movement   Pharyngeal Phase, Puree intact   Diagnostic Statement On puree trial, pt holding food in mouth and requiring a cue to swallow.    Clinical Swallow Evaluation: Semisolid Texture Trial   Mode of Presentation, Semisolid spoon;self-fed;fed by clinician   Volume of Semisolid Food Presented 1 oz.   Oral Phase, Semisolid Poor AP movement  (increased mastication time)   Pharyngeal Phase, Semisolid intact;repeated swallows   Diagnostic Statement Pt demonstrating increased mastication time, likely due to missing dentures, as well as a delayed swallow. Pt exhibited repeated swallows on +1/2 semi-solid trials.    Esophageal Phase of Swallow   Esophageal comments Pt has a history of esophageal cancer.    Swallowing Recommendations   Diet Consistency Recommendations dysphagia level 2 (mechanically altered);thin liquids   Supervision Level for Intake 1:1 supervision needed   Mode of Delivery  Recommendations bolus size, small   Monitoring/Assistance Required (Eating/Swallowing) check mouth frequently for oral residue/pocketing   Recommended Feeding/Eating Techniques (Swallow Eval) maintain upright sitting position for eating;maintain upright posture during/after eating for 30 minutes;minimize distractions during oral intake   Comment, Swallowing Recommendations Pt upright in bed when therapist arrived. Pt able to follow simple directions for oral musculature exam. Pt missing bottom dentures but oral musculature otherwise intact and WFL. Pt daughter in room for the bedside swallow. Pt's daughter reporting history of dysphagia post esophageal cancer chemo/radiation. Pt's daughter reports that pt ha been missing dentures for over a month and that he is using gums to break down food. She stated he did okay with spaghetti and meatballs today but had a little difficulty with the meatballs. Pt trialing thin liquid, semi-solid, and pureed textures during bedside exam. Pt demonstrating delayed swallow on 1/3 liquid trials and a throat clear on 1/3 trials. On semi-solid trials, pt demonstrating increased mastication time, likely due to missing dentures, as well as a delayed swallow. Pt exhibited repeated swallows on +1/2 semi-solid trials. Once cued to swallow, pt had appropriate laryngeal movement. On puree trial, pt holding food in mouth and requiring a cue to swallow. Pt's daughter educated on the possibility of aspiration due to pt holding food in mouth and his dementia. At this time, it is recommended that pt continue NDD2 and thin liquids. Pt should be supervised to ensure swallowing after each bite. Oral cares should be completed post PO intake to ensure no pocketing of food. Pt should also be upright for 30 minutes post PO intake.    General Therapy Interventions   Planned Therapy Interventions Dysphagia Treatment   Dysphagia treatment Modified diet education;Instruction of safe swallow strategies   SLP  Therapy Assessment/Plan   Criteria for Skilled Therapeutic Interventions Met (SLP Eval) yes   SLP Diagnosis Dysphagia   Rehab Potential (SLP Eval) fair, will monitor progress closely   Therapy Frequency (SLP Eval) daily   Predicted Duration of Therapy Intervention (SLP Eval) Duration of this hospital stay    Total Evaluation Time   Total Evaluation Time (Minutes) 15   Coping Strategies   Trust Relationship/Rapport care explained;empathic listening provided

## 2021-06-28 NOTE — PROGRESS NOTES
06/28/21 1504   Signing Clinician's Name / Credentials   Signing clinician's name / credentials Angeilque Jaquez OTR/L    Quick Adds   Rehab Discipline OT   Gait Training   Symptoms Noted During/After Treatment (Gait Training) fatigue   Treatment Detail ambulation in room and hallway    [place] Level (Gait Training) contact guard   Physical Assistance Level (Gait Training) 2 person assist   Assistive Device (Gait Training) rolling walker   ADL Training   Patient Response Comments pt very confused and disoriented, needs frequent cues to follow directions, etc    Toilet Training   [place] Level (Toilet Training) maximum assist (25% patient effort)   Assistance (Toilet Training) 1 person assist   Treatment Detail pt needed cues to not pull on catheter tubing    OT Discharge Planning    OT Discharge Recommendation (DC Rec) Transitional Care Facility   OT Rationale for DC Rec Pt needs higher level of care due to cognition and safety on feet/balance, would benefit from more therapy and assist/supervision 24 hours.    OT Brief overview of current status  Pt in bed and anxious to get up to the bathroom, pt needed min assist supine to sit, min assist of 1-2 for ambulation in room and hallway, needed constant verbal cues for directions and safety. Pt disoriented and gets frustrated, but responds well majority of time to cues.    Additional Documentation   OT Plan cont OT    Total Session Time   Total Session Time (minutes) 30 minutes

## 2021-06-28 NOTE — PROGRESS NOTES
[facility]    Medicine Progress Note - Hospitalist Service       Date of Admission:  6/26/2021    Assessment & Plan           Principal Problem:    Fever, unspecified fever cause    Assessment: unclear etiology. Differential diagnosis includes tick-borne illness, viral infection (both with leukopenia and thrombocytopenia), aspiration pneumonitis. Has received vancomycin, zosyn and doxycycline.     Plan: Stop vancomycin. Continue zosyn and doxycycline. Await tick panel and cultures.    Active Problems:    Controlled type 2 diabetes mellitus without complication, without long-term current use of insulin (H)    Assessment: holding oral meds, well controlled on sliding scale insulin    Plan: sliding scale insulin      Benign essential hypertension    Assessment: chronic      Adenocarcinoma of esophagus (H)    Assessment: chronic, status post radiation treatment    Plan: MRI brain if patient compliant to look for mets      Septic encephalopathy    Assessment: vs dementia    Plan: MRI today, monitor       Diet: Consistent Carbohydrate Diet 2312-5640 Calories: Moderate Consistent CHO (4-6 CHO units/meal)    DVT Prophylaxis: Pneumatic Compression Devices  Plascencia Catheter: PRESENT, indication: Retention  Central Lines: None  Code Status: Full Code      Disposition Plan   Expected discharge: 2 - 3 days, recommended to transitional care unit once antibiotic plan established.     The patient's care was discussed with the Patient and Patient's Family.    Santana Downing MD  Hospitalist Service  [facility]  Securely message with the Vocera Web Console (learn more here)  Text page via [employer] Paging/Directory        ______________________________________________________________________    Interval History   Confused, wants to go home.     Data reviewed today: I reviewed all medications, new labs and imaging results over the last 24 hours. I personally reviewed no images or EKG's  today.    Physical Exam   Vital Signs: Temp: 96.1  F (35.6  C) Temp src: Tympanic BP: (!) 147/70 Pulse: 75   Resp: 16 SpO2: 98 % O2 Device: None (Room air)    Weight: 155 lbs 0 oz  GENERAL: Comfortable, no apparent distress.  CARDIOVASCULAR: regular rate and rhythm, no murmur. No lower extremity edema   RESPIRATORY: Clear to auscultation bilaterally, no wheezes or crackles.  GI: non-tender, non-distended, normal bowel sounds.   SKIN: warm periphery, no rashes      Data   Recent Labs   Lab 06/28/21  0530 06/27/21  0540 06/26/21  1420   WBC 3.4* 5.9 8.7   HGB 11.8* 11.5* 13.1*   MCV 90 91 91   PLT 84* 97* 128*   INR  --   --  1.11    134 133*   POTASSIUM 3.4* 3.7 3.9   CHLORIDE 106 104 100   CO2 20* 21 21   BUN 9 15 20   CR 0.66* 0.75 0.81   ANIONGAP 9 9 12   JOSELITO 8.1* 8.2* 9.4   * 136* 294*   ALBUMIN  --   --  4.4   PROTTOTAL  --   --  6.8   BILITOTAL  --   --  1.3*   ALKPHOS  --   --  51   ALT  --   --  39   AST  --   --  31     Medications     sodium chloride 125 mL/hr at 06/27/21 0526       doxycycline (VIBRAMYCIN) IV  100 mg Intravenous Q12H     insulin aspart  1-3 Units Subcutaneous TID AC     insulin aspart  1-3 Units Subcutaneous At Bedtime     pantoprazole  40 mg Oral QAM     piperacillin-tazobactam  4.5 g Intravenous Q6H     sodium chloride (PF)  3 mL Intracatheter Q8H     sodium chloride (PF)  3 mL Intracatheter Q8H

## 2021-06-29 NOTE — PROGRESS NOTES
[facility]    Medicine Progress Note - Hospitalist Service       Date of Admission:  6/26/2021    Assessment & Plan                      Principal Problem:    Fever, unspecified fever cause    Assessment: unclear etiology. Differential diagnosis includes tick-borne illness, viral infection (both with leukopenia and thrombocytopenia), aspiration pneumonitis. Has received vancomycin, zosyn and doxycycline. No recurrent fever. Lyme negative.    Plan: Stop zosyn and continue doxycycline day 4. Await anaplasma and cultures.    Active Problems:    Controlled type 2 diabetes mellitus without complication, without long-term current use of insulin (H)    Assessment: holding oral meds, well controlled on sliding scale insulin    Plan: sliding scale insulin      Benign essential hypertension    Assessment: chronic      Adenocarcinoma of esophagus (H)    Assessment: chronic, status post radiation treatment. MRI negative for mass      Septic encephalopathy    Assessment:back to baseline dementia      Dementia    Assessment: stable, cooperative. patient would benefit from assisted living memory care unit.         Diet: Consistent Carbohydrate Diet 4543-4558 Calories: Moderate Consistent CHO (4-6 CHO units/meal)    DVT Prophylaxis: Pneumatic Compression Devices  Plascencia Catheter: PRESENT, indication: Retention  Central Lines: None  Code Status: Full Code      Disposition Plan   Expected discharge: Tomorrow, recommended to transitional care unit once safe disposition plan/ TCU bed available.     The patient's care was discussed with the Patient.    Santana Downing MD  Hospitalist Service  [facility]  Securely message with the Vocera Web Console (learn more here)  Text page via [employer] Paging/Directory      ______________________________________________________________________    Interval History   No complaints, wants to go home.     Data reviewed today: I reviewed all medications, new labs  and imaging results over the last 24 hours. I personally reviewed no images or EKG's today.    Physical Exam   Vital Signs: Temp: 97  F (36.1  C) Temp src: Tympanic BP: 133/66 Pulse: 71   Resp: 16 SpO2: 99 % O2 Device: None (Room air)    Weight: 155 lbs 0 oz  GENERAL: Comfortable, no apparent distress.  CARDIOVASCULAR: regular rate and rhythm, no murmur. No lower extremity edema   RESPIRATORY: Clear to auscultation bilaterally, no wheezes or crackles.  GI: non-tender, non-distended, normal bowel sounds.   SKIN: warm periphery, no rashes      Data   Recent Labs   Lab 06/29/21  0555 06/28/21  1233 06/28/21  0530 06/27/21  0540 06/26/21  1420   WBC 3.0*  --  3.4* 5.9 8.7   HGB 12.0*  --  11.8* 11.5* 13.1*   MCV 90  --  90 91 91   PLT 91*  --  84* 97* 128*   INR  --   --   --   --  1.11     --  135 134 133*   POTASSIUM 3.5 3.9 3.4* 3.7 3.9   CHLORIDE 107  --  106 104 100   CO2 22  --  20* 21 21   BUN 8  --  9 15 20   CR 0.69*  --  0.66* 0.75 0.81   ANIONGAP 9  --  9 9 12   JOSELITO 8.3*  --  8.1* 8.2* 9.4   *  --  162* 136* 294*   ALBUMIN 3.6  --   --   --  4.4   PROTTOTAL 5.5*  --   --   --  6.8   BILITOTAL 0.7  --   --   --  1.3*   ALKPHOS 35  --   --   --  51   ALT 22  --   --   --  39   AST 22  --   --   --  31     Medications     sodium chloride 125 mL/hr at 06/29/21 1047       doxycycline (VIBRAMYCIN) IV  100 mg Intravenous Q12H     insulin aspart  1-3 Units Subcutaneous TID AC     insulin aspart  1-3 Units Subcutaneous At Bedtime     nicotine  1 patch Transdermal Daily     nicotine   Transdermal Q8H     OLANZapine zydis  5 mg Oral At Bedtime     pantoprazole  40 mg Oral QAM     piperacillin-tazobactam  4.5 g Intravenous Q6H     QUEtiapine  25 mg Oral BID     sodium chloride (PF)  3 mL Intracatheter Q8H

## 2021-06-29 NOTE — PROGRESS NOTES
Patient is alert and orientated to self and intermittently place. Word finding difficulty with speech. Sitter is at bedside when family members are not present. Lungs clear throughout. Heart regular. Cool lower extremities, pedal pulses present. Night RN reported maroon/bloody stool, received order for occult stool, still waiting to get sample but patient has not had BM this shift. Plascencia was pulled at 1500, patient has been voiding since, PVR of  241m. Blanchable redness to buttocks, applied barrier cream. SBA with gait belt and walker. Patient showered this shift. VSS. Will continue to monitor.    BP (!) 147/82 (BP Location: Left arm)   Pulse 85   Temp 97.8  F (36.6  C) (Tympanic)   Resp 16   Wt 70.3 kg (155 lb)   SpO2 99%   BMI 23.74 kg/m           06/29/21 1713   Output (mL)   Voided (mL) 400 mL   Urine Assessment   Urine Characteristics Yellow / Straw Colored;clear   Urine Odor No odor   Bladder Scan Volume (mL) 241 ml  (PVR)

## 2021-06-29 NOTE — PROGRESS NOTES
06/29/21 1308   Signing Clinician's Name / Credentials   Signing clinician's name / credentials Angelique Jaquez OTR/L    Quick Adds   Rehab Discipline OT   Functional Transfer Training   Symptoms Noted During/After Treatment fatigue   Treatment Detail pt ambulated in hallway and room with CGA using FWW, less cues needed today for directions, pt was more lucid today, less confused.    Gait Training   Symptoms Noted During/After Treatment (Gait Training) fatigue   [place] Level (Gait Training) contact guard   Physical Assistance Level (Gait Training) 1 person assist;verbal cues   Assistive Device (Gait Training) rolling walker   ADL Training   Treatment Detail will assess further as appropriate    OT Discharge Planning    OT Discharge Recommendation (DC Rec) Transitional Care Facility  (vs assisted living, will continue to assess )   Additional Documentation   OT Plan cont OT    Total Session Time   Total Session Time (minutes) 15 minutes

## 2021-06-29 NOTE — PROGRESS NOTES
06/29/21 1200   Signing Clinician's Name / Credentials   Signing clinician's name / credentials George Marcus MPT   Quick Adds   Rehab Discipline PT   Functional Transfer Training   Treatment Detail CGA   Gait Training   Symptoms Noted During/After Treatment (Gait Training) fatigue   Distance in Feet (Required for LE Total Joints) 225   Treatment Detail ambulation in room and hallway    [place] Level (Gait Training) contact guard   Physical Assistance Level (Gait Training) 1 person + 1 person to manage equipment   Weight Bearing (Gait Training) full weight-bearing   Assistive Device (Gait Training) rolling walker   Gait Analysis Deviations decreased aminata   Therapeutic Activity   Treatment Detail bed mobilities with SBA   PT Discharge Planning    PT Discharge Recommendation (DC Rec)   (assisted living facility)   PT Rationale for DC Rec Pt will likely be at baseline for physical abilitites at time of discharge. Question if cognition is at baseline or declined related to current health status. Physical mobility skills are approaching functional; needs assist/direction due to cognitive deficits.    PT Brief overview of current status  Pt requires CGA  for safe mobility skills, primarily limited by cognitive deficits. Impulsiveness increases fall risk with ambulation and transfers. Ambulated both with FWW and without device in room.    Additional Documentation   PT Plan PT to remain available to assist with patient mobility as needed   Total Session Time   Total Session Time (minutes) 15 minutes

## 2021-06-29 NOTE — PLAN OF CARE
BP elevated, had 4 loose, maroon/red stools, slept most of night.     Huong Aggarwal RN on 6/29/2021 at 5:33 AM

## 2021-06-29 NOTE — PROGRESS NOTES
:    Met with patient's daughter, Nirmala per her request. Provided her with update that [facility] is not considering patients with confusion and that [facility] has not gotten back to this writer on the referral yet. Nirmala reports that she is also interested in patient being placed at [facility]. Notified her that [facility] is an [facility] and would be private pay. Nirmala understands this and would be able to private pay at an half-way if needed.     ROSS Davalos on 6/29/2021 at 10:27 AM    Addendum:    Faxed referral to [facility] ([phone]). Called Adia ([phone]) at [facility] and left voicemail notifying her of referral. Requested call back.      will continue to follow.     ROSS Davalos on 6/29/2021 at 10:39 AM    Addendum:    Received update from MD that patient's daughter, Nirmala has also mentioned that she would like patient's wife to be placed at [facility], if possible. Will inquire with Adia at [facility] when she returns call.     ROSS Davalos on 6/29/2021 at 11:26 AM    Addendum:    Called Chandni at [facility] and provided discharge update. Requested update on referral that was made yesterday. Anticipated discharge tomorrow.     ROSS Davalos on 6/29/2021 at 1:20 PM    Addendum:    Received call back from Chandni at [facility]. She reports that they just starting reviewing referral as a bed just became available. Chandni reports that she will notify when a decision is made.     ROSS Davalos on 6/29/2021 at 1:22 PM    Addendum:    Received voicemail from Chandni at [facility]. They have declined patient as they feel he will not be able to participate in rehab and that they will not be able to meet his needs.     ROSS Davalos on 6/29/2021 at 1:59 PM

## 2021-06-29 NOTE — PROGRESS NOTES
SAFETY CHECKLIST  ID Bands and Risk clasps correct and in place (DNR, Fall risk, Allergy, Latex, Limb):  Yes  All Lines Reconciled and labeled correctly: Yes  Whiteboard updated:Yes  Environmental interventions (bed/chair alarm on, call light, side rails, restraints, sitter....): Yes  Verify Tele #: [id number]    Huong Aggarwal RN on 6/28/2021 at 7:37 PM

## 2021-06-29 NOTE — PROGRESS NOTES
06/29/21 0800   Signing Clinician's Name / Credentials   Signing clinician's name / credentials Valerie Conway CCC-SLP   Quick Adds   Rehab Discipline SLP   Quick Adds Speech Language Pathology   SLP - Dysphagia/Swallow   Minutes of Treatment 15 minutes   Symptoms Noted During/After Treatment None   Treatment Detail Pt seated in chair when therapist arrived. Pt much more alert today, however still disoriented. Skilled swallowing treatment completed as pt ate breakfast. Pt eating summers, scrambled eggs, and oatmeal during treatment. Pt drinking water. Pt able to eat and drink independently today and not holding food in mouth. No cues required for pt to initiate swallow. Pt first bite of scrambled eggs was very large. Mastication time increased due to large bite as well as missing bottom dentures. Pt took sip of water while scrambled eggs still in mouth. Pt reminded to take small sips and wait until completing done with one bite before taking another bite/sip. Pt able to eat small bite of summers without any overt s/sx asp/pen. Once again, pt demonstrating increased mastication time due to missing bottom dentures. Pt taking bite of oatmeal without any overt s/sx asp/pen.  No overt s/sx asp/pen on +4/4 thin liquid trials. On solid and semi-solid food trials, pt exhibiting repeated swallows and gumming food to break it down in between swallows. Pt continues to benefit from supervision during meals to cue for small bites/sips as well as for reminders to finish bite/sip before taking another. Pt may need reminders to swallow if alertness is decreased. NDD2 and thin liquids continue to be recommended.    Additional Documentation   SLP Plan SLP to remain involved as needed.    Total Session Time   Total Session Time (minutes) 15 minutes

## 2021-06-30 NOTE — PLAN OF CARE
Pt is alert to person, has trouble finding words. denies pain. VSS. LS clear throughout. No stools this shift. Pt slept most of shift. Sitter at bedside. SBA assist to bathroom with walker and gait belt. BP (!) 152/69   Pulse 75   Temp 97  F (36.1  C) (Oral)   Resp 18   Wt 70.3 kg (155 lb)   SpO2 96%   BMI 23.74 kg/m      Jacqueline Aquino RN on 6/30/2021 at 4:37 AM

## 2021-06-30 NOTE — PROGRESS NOTES
:     Follow up call to Adia at [facility].  She stated they have not been able to review the referral.  She does have openings, but would not be able to look at new admissions until next week.      FLORENCIA Timmons on 6/30/2021 at 10:15 AM    Addendum:     Met with patient's wife and daughter in a conference room to discuss discharge planning.  Updated daughter on status of referrals.  They are also interested in [facility], [facility], and [facility].  She would like to tour today if able.  Phone call to [facility] and they have a wait list.  Phone call with Beatriz at [facility].  They have one memory care bed open.  Faxed referral and they will screen.  They are aware that placement is urgent as patient is ready to discharge from hospital.    FLORENCIA Timmons on 6/30/2021 at 10:47 AM    Addendum:     Follow up call placed to [facility].  Left a message for Elma HOUSE requesting a call back regarding referral.    Phone message left for Sky at [facility] inquiring on beds.  Requested a call back.    FLORENCIA Timmons on 6/30/2021 at 2:44 PM

## 2021-06-30 NOTE — PROGRESS NOTES
[facility]    Medicine Progress Note - Hospitalist Service       Date of Admission:  6/26/2021    Assessment & Plan                    Principal Problem:    Fever, unspecified fever cause    Assessment: unclear etiology. Differential diagnosis includes tick-borne illness, viral infection (both with leukopenia and thrombocytopenia), aspiration pneumonitis. Has received vancomycin, zosyn and doxycycline. No recurrent fever. Lyme negative.    Plan:Continue doxycycline day 5. Await anaplasma and cultures.    Active Problems:    Controlled type 2 diabetes mellitus without complication, without long-term current use of insulin (H)    Assessment: holding oral meds, controlled on sliding scale insulin    Plan: sliding scale insulin      Benign essential hypertension    Assessment: chronic      Adenocarcinoma of esophagus (H)    Assessment: chronic, status post radiation treatment. MRI negative for mass      Septic encephalopathy    Assessment: back to baseline dementia      Dementia    Assessment: stable, cooperative. patient would benefit from assisted living memory care unit.           Diet: Consistent Carbohydrate Diet 9814-7666 Calories: Moderate Consistent CHO (4-6 CHO units/meal) (NDD2 with thin liquids)    DVT Prophylaxis: SCD  Plascencia Catheter: Not present  Central Lines: None  Code Status: Full Code      Disposition Plan   Expected discharge: Tomorrow, recommended to transitional care unit once antibiotic plan established.     The patient's care was discussed with the Patient.    Santana Downing MD  Hospitalist Service  [facility]  Securely message with the Vocera Web Console (learn more here)  Text page via [employer] Paging/Directory      ______________________________________________________________________    Interval History   Comfortable. Confused.     Data reviewed today: I reviewed all medications, new labs and imaging results over the last 24 hours. I personally  reviewed no images or EKG's today.    Physical Exam   Vital Signs: Temp: 97.5  F (36.4  C) Temp src: Tympanic BP: 121/62 Pulse: 79   Resp: 16 SpO2: 98 % O2 Device: None (Room air)    Weight: 155 lbs 0 oz  GENERAL: Comfortable, no apparent distress.  CARDIOVASCULAR: regular rate and rhythm, no murmur. No lower extremity edema   RESPIRATORY: Clear to auscultation bilaterally, no wheezes or crackles.  GI: non-tender, non-distended, normal bowel sounds.   SKIN: warm periphery, no rashes      Data   Recent Labs   Lab 06/30/21  0530 06/29/21  0555 06/28/21  1233 06/28/21  0530 06/27/21  0540 06/26/21  1420   WBC  --  3.0*  --  3.4* 5.9 8.7   HGB  --  12.0*  --  11.8* 11.5* 13.1*   MCV  --  90  --  90 91 91   PLT  --  91*  --  84* 97* 128*   INR  --   --   --   --   --  1.11   NA  --  138  --  135 134 133*   POTASSIUM 3.5 3.5 3.9 3.4* 3.7 3.9   CHLORIDE  --  107  --  106 104 100   CO2  --  22  --  20* 21 21   BUN  --  8  --  9 15 20   CR  --  0.69*  --  0.66* 0.75 0.81   ANIONGAP  --  9  --  9 9 12   JOSELITO  --  8.3*  --  8.1* 8.2* 9.4   GLC  --  173*  --  162* 136* 294*   ALBUMIN  --  3.6  --   --   --  4.4   PROTTOTAL  --  5.5*  --   --   --  6.8   BILITOTAL  --  0.7  --   --   --  1.3*   ALKPHOS  --  35  --   --   --  51   ALT  --  22  --   --   --  39   AST  --  22  --   --   --  31     Medications     sodium chloride 125 mL/hr at 06/30/21 0819       doxycycline (VIBRAMYCIN) IV  100 mg Intravenous Q12H     insulin aspart  1-3 Units Subcutaneous TID AC     insulin aspart  1-3 Units Subcutaneous At Bedtime     nicotine  1 patch Transdermal Daily     nicotine   Transdermal Q8H     OLANZapine zydis  5 mg Oral At Bedtime     pantoprazole  40 mg Oral QAM     piperacillin-tazobactam  4.5 g Intravenous Q6H     QUEtiapine  25 mg Oral BID     sodium chloride (PF)  3 mL Intracatheter Q8H

## 2021-06-30 NOTE — PROGRESS NOTES
Weights reivewed per [facility] screen: pt has not had significant wt loss in past year. Has lost ~10# (6%). Does have dx dementia. May benefit from a care facility to prepare meals for him. Follow up weekly. Please order consult if needed sooner.   Wt Readings from Last 10 Encounters:   06/28/21 70.3 kg (155 lb)   06/14/21 70.5 kg (155 lb 6.4 oz)   09/24/20 70.3 kg (155 lb)   09/11/20 70.4 kg (155 lb 4 oz)   09/01/20 72.6 kg (160 lb 1.6 oz)   08/26/20 72.7 kg (160 lb 4.8 oz)   08/19/20 74.8 kg (165 lb)   08/16/20 75 kg (165 lb 5.5 oz)   08/11/20 74.2 kg (163 lb 9.6 oz)   08/10/20 75.2 kg (165 lb 11.2 oz)     Huong Quintero RD on 6/30/2021 at 8:39 AM

## 2021-06-30 NOTE — PROGRESS NOTES
Patient calm and cooperative all day, family present with patient from 1000 to 1300, daughter requesting ativan and sitter when family leaving, 0.5 mg IV ativan given, sitter present, patient sleeping, VSS, will continue to monitor. Giacomo Medina RN on 6/30/2021 at 1:20 PM

## 2021-06-30 NOTE — PLAN OF CARE
Patient awake and alert, disoriented to situation, visitors present throughout shift, afebrile, cooperative with cares, up to bathroom with walker and 1 assist, sitter present. Giacomo Medina RN on 6/30/2021 at 6:35 PM

## 2021-07-01 NOTE — PLAN OF CARE
Patient has been pleasant. Vitals are stable. Patient is afebrile. Patient is impulsive and has a sitter. Patient is waiting on placement. No new concerns at this time. Will continue to monitor.   Serjio West RN on 7/1/2021 at 12:32 PM    No new changes at this time.  Serjio West RN on 7/1/2021 at 6:51 PM

## 2021-07-01 NOTE — PROGRESS NOTES
SAFETY CHECKLIST  ID Bands and Risk clasps correct and in place (DNR, Fall risk, Allergy, Latex, Limb):  Yes  All Lines Reconciled and labeled correctly: Yes  Whiteboard updated:Yes  Environmental interventions (bed/chair alarm on, call light, side rails, restraints, sitter....): Yes  Verify Tele #: [id number]

## 2021-07-01 NOTE — PROGRESS NOTES
07/01/21 0800   Signing Clinician's Name / Credentials   Signing clinician's name / credentials Christine La CCC-SLP   Quick Adds   Rehab Discipline SLP   Quick Adds Speech Language Pathology   SLP - Dysphagia/Swallow   Minutes of Treatment 20 minutes   Treatment Detail Pt trialed with NDD1 through regular textures and thin liquids. Difficulty with pocketing and not clearing NDD3 and regular textures. Family was concerned about pt originally recieving regular textures; however, was educated on diet process and pt safety and they were okay with events. Pt diet orders were updated to NDD2 with thins.    SLP Discharge Planning    SLP Discharge Recommendation (DC Rec) [facility]   SLP Rationale for DC Rec Session outcomes.   SLP Brief overview of current status  Forgetfullness. Pt recommended for NDD2 with close supervision, checking for pocketing, oral cares after all PO intake, alternating sips and bites, as well as cues for dry swallows to clear oral residue.    Additional Documentation   SLP Plan Adequate for discharge   Total Session Time   Total Session Time (minutes) 20 minutes

## 2021-07-01 NOTE — PROGRESS NOTES
:     Spoke with Elma HOUSE at [facility].  Provided with a verbal referral for memory care unit.  She would like to screen patient here at 1100 if daughter can be present for collateral information.  If they can meet patient's needs the soonest they could admit patient would be Monday or Friday, but she will need to check on staffing.    Spoke with daughter Nirmala and she will be here at 1100 for assisted living screening.    FLORENCIA Timmons on 7/1/2021 at 9:16 AM

## 2021-07-01 NOTE — PROGRESS NOTES
:     Spoke with Elma from [facility].  She screened patient and will need to discuss with her team.  She will call to notify of their decision.    FLORENCIA Timmons on 7/1/2021 at 12:03 PM    Addendum:     Left a message with Natalie HOOPER inquiring on memory care beds.     Left a message with Sky at [facility] inquiring on speciality care beds.     Spoke with Gabriela at [facility].  They have no memory care beds open, but will have assisted living call to see if they could meet his needs.    Coordinated with Chandni at [facility] inquired about long term care beds.  She updated that he had been declined for rehab and long term care due to memory care needs.    FLORENCIA Timmons on 7/1/2021 at 1:41 PM    Addendum:    Received an update from Elma at [facility].  They declined patient.    Contacted [facility].  No open beds.    FLORENCIA Timmons on 7/1/2021 at 2:19 PM

## 2021-07-01 NOTE — PROGRESS NOTES
[facility]    Medicine Progress Note - Hospitalist Service       Date of Admission:  6/26/2021    Assessment & Plan                              Principal Problem:    Fever, unspecified fever cause    Assessment: unclear etiology. Differential diagnosis includes tick-borne illness, viral infection (both with leukopenia and thrombocytopenia), aspiration pneumonitis. Has received vancomycin, zosyn and doxycycline. No recurrent fever. Lyme negative.    Plan:Continue doxycycline day 6.  Await anaplasma and cultures.    Active Problems:    Controlled type 2 diabetes mellitus without complication, without long-term current use of insulin (H)    Assessment: holding oral meds, controlled on sliding scale insulin    Plan: sliding scale insulin      Benign essential hypertension    Assessment: chronic      Adenocarcinoma of esophagus (H)    Assessment: chronic, status post radiation treatment. MRI negative for mass      Septic encephalopathy    Assessment: back to baseline dementia      Dementia    Assessment: stable, cooperative. patient would benefit from assisted living memory care unit. No evidence for stroke on MRI    Plan: awaiting placement.          Diet: Consistent Carbohydrate Diet [id number] Calories: Moderate Consistent CHO (4-6 CHO units/meal) (NDD2 with thin liquids)    DVT Prophylaxis: Pneumatic Compression Devices  Plascencia Catheter: Not present  Central Lines: None  Code Status: Full Code      Disposition Plan   Expected discharge: Tomorrow, recommended to memory care unit once safe disposition plan/ TCU bed available.     The patient's care was discussed with the Patient.    Santana Downing MD  Hospitalist Service  [facility]  Securely message with the Vocera Web Console (learn more here)  Text page via [facility] Paging/Directory          Interval History   No complaints. Confused    Data reviewed today: I reviewed all medications, new labs and imaging results over the  last 24 hours. I personally reviewed no images or EKG's today.    Physical Exam   Vital Signs: Temp: 97.3  F (36.3  C) Temp src: Tympanic BP: (!) 145/76 Pulse: 78   Resp: 28 SpO2: 96 % O2 Device: None (Room air)    Weight: 159 lbs 6.4 oz  GENERAL: Comfortable, no apparent distress.  CARDIOVASCULAR: regular rate and rhythm, no murmur. No lower extremity edema   RESPIRATORY: Clear to auscultation bilaterally, no wheezes or crackles.  GI: non-tender, non-distended, normal bowel sounds.   SKIN: warm periphery, no rashes      Data   Recent Labs   Lab 07/01/21  0545 06/30/21  0530 06/29/21  0555 06/28/21  0530 06/28/21  0530 06/27/21  0540 06/26/21  1420   WBC  --   --  3.0*  --  3.4* 5.9 8.7   HGB  --   --  12.0*  --  11.8* 11.5* 13.1*   MCV  --   --  90  --  90 91 91   PLT  --   --  91*  --  84* 97* 128*   INR  --   --   --   --   --   --  1.11   NA  --   --  138  --  135 134 133*   POTASSIUM 3.5 3.5 3.5   < > 3.4* 3.7 3.9   CHLORIDE  --   --  107  --  106 104 100   CO2  --   --  22  --  20* 21 21   BUN  --   --  8  --  9 15 20   CR  --   --  0.69*  --  0.66* 0.75 0.81   ANIONGAP  --   --  9  --  9 9 12   JOSELITO  --   --  8.3*  --  8.1* 8.2* 9.4   GLC  --   --  173*  --  162* 136* 294*   ALBUMIN  --   --  3.6  --   --   --  4.4   PROTTOTAL  --   --  5.5*  --   --   --  6.8   BILITOTAL  --   --  0.7  --   --   --  1.3*   ALKPHOS  --   --  35  --   --   --  51   ALT  --   --  22  --   --   --  39   AST  --   --  22  --   --   --  31    < > = values in this interval not displayed.     Medications       doxycycline (VIBRAMYCIN) IV  100 mg Intravenous Q12H     insulin aspart  1-3 Units Subcutaneous TID AC     insulin aspart  1-3 Units Subcutaneous At Bedtime     nicotine  1 patch Transdermal Daily     nicotine   Transdermal Q8H     OLANZapine zydis  5 mg Oral At Bedtime     pantoprazole  40 mg Oral QAM     QUEtiapine  25 mg Oral BID     sodium chloride (PF)  3 mL Intracatheter Q8H

## 2021-07-01 NOTE — PROGRESS NOTES
"Uneventful night. Patient was anxious the beginning of the night, \"wanting to go home and go to the store to buy chewing tobacco.\" He was also concerned about not being able to take care of his animals. Was easily redirected. 1:1 observation. No aggression, but remains impulsive. Disoriented to time and situation. Aware he's in the hospital, but unsure why. IV saline locked. Continuing doxycycline IV. Awaiting placement. Blood pressure elevated at 172/77 at 0400 vital sign check. Will continue to monitor.    BP (!) 172/77 (BP Location: Right arm)   Pulse 84   Temp 97.7  F (36.5  C) (Tympanic)   Resp 16   Wt 72.3 kg (159 lb 6.4 oz)   SpO2 97%   BMI 24.42 kg/m       Stevie Anand RN on 7/1/2021 at 5:43 AM      "

## 2021-07-01 NOTE — PROGRESS NOTES
SAFETY CHECKLIST  ID Bands and Risk clasps correct and in place (DNR, Fall risk, Allergy, Latex, Limb):  Yes  All Lines Reconciled and labeled correctly: Yes  Whiteboard updated:Yes  Environmental interventions (bed/chair alarm on, call light, side rails, restraints, sitter....): Yes  Verify Tele #: [id number]    Stevie Anand RN on 6/30/2021 at 7:30 PM

## 2021-07-02 NOTE — PROGRESS NOTES
:     Spoke with Prabha at [facility].  Faxed referral.     Left a message for Fang HOUSE at [facility] inquiring if they have beds in memory care.     Left a message for Emma at [facility] inquiring on memory care beds.    FLORENCIA Timmons on 7/2/2021 at 9:12 AM    Addendum:     Received a return call from Pati MAKI at [facility].  She stated they do have beds open.  Updated that family is planning to take patient home now.     FLORENCIA Timmons on 7/2/2021 at 11:26 AM    Addendum:     Per discharge planning meeting patient will be returning home with family this afternoon.  No further discharge planning needs.    FLORENCIA Timmons on 7/2/2021 at 12:42 PM

## 2021-07-02 NOTE — DISCHARGE SUMMARY
"[facility]    Discharge Summary  Hospitalist    Date of Admission:  6/26/2021  Date of Discharge:  7/2/2021  Discharging Provider: Antonino Wilcox  Date of Service (when I saw the patient): 07/02/21    Discharge Diagnoses   Principal Problem:    Fever, unspecified fever cause (6/26/2021)  Active Problems:    Controlled type 2 diabetes mellitus without complication, with long-term current use of insulin (H) (9/10/2015)    Benign essential hypertension (11/28/2018)    Adenocarcinoma of esophagus (H) (7/2/2020)    Septic encephalopathy (6/28/2021)    Senile dementia, uncomplicated (H) (6/28/2021)    Essential hypertension, malignant (6/28/2021)    Personal history of tobacco use, presenting hazards to health (6/28/2021)    Encounter for long-term (current) use of aspirin (6/28/2021)    Hypomagnesemia (6/28/2021)    Need for prophylactic chemotherapy (6/28/2021)    Hyperlipidemia, unspecified hyperlipidemia type (6/28/2021)    Type 2 diabetes mellitus without complication, unspecified whether long term insulin use (H) (6/28/2021)    Encounter for screening laboratory testing for COVID-19 virus (6/28/2021)      History of Present Illness   Vikas Ji is an 81 year old male who presented with \"past medical history of dementia, type II diabetes mellitus, hyperlipidemia, and adenocarcinoma of distal esophagus S/P palliative radiation who presented with weakness and confusion.  Per report from his wife and daughter, his symptoms came on suddenly around lunch when he began shaking and appeared weak and confusion.  They promptly brought him to the emergency department to be evaluated.  Work-up was significant for fever, Tmax 103.4, mild hyponatremia, hyperglycemia, hematuria, and CXR and head CT without acute abnormalities.  He received sepsis bolus fluids and was started on broad-spectrum antibiotics with subsequent improvement in his mental status.  His wife feels that he is back at his mental " "baseline though his daughter believes he is still somewhat confused.  His dementia has been progressing, and he has struggled to perform house and yard work like he used to.  He is dependent upon his wife for medication management and requires prompting from her to eat and drink.  He does not always remember names, frequently referring to his son as \"that boy down the street.\"  His daughter has been looking into assisted living placement for both of her parents.  They are currently on waiting lists and due for some meet-and-greet visits soon.  He is hard of hearing and has only one hearing aid with him.\"    Hospital Course   Vikas Ji was admitted on 6/26/2021.  He was evaluated and did not have any significant laboratory abnormalities.  Chest x-ray and head CT were normal.  Labs were unrevealing, blood and urine cultures were placed and tickborne illness specimens were sent.  He was treated initially with broad-spectrum antibiotics with vancomycin Zosyn and doxycycline.  MRI was done which showed no evidence of stroke.  He has poorly controlled diabetes and was managed on sliding scale insulin while as an inpatient.  He was then presumed to have tickborne illness and his IV antibiotics were stopped and he was continued on oral doxycycline after his fever defervesced.  He continued to improve despite having negative Lyme test and PCR for anaplasmosis, tickborne illness is a presumptive diagnosis based on clinical presentation.    Due to his dementia, there was some thought of a skilled nursing or assisted living placement however family has rallied to care for the patient and his wife at home and the plan is for him to go home later this evening after dinner.  His sliding scale insulin will be stopped and he will be continued on Invokana and Metformin as an outpatient.  He will continue doxycycline to complete a 10-day course.  He did have some agitation while hospitalized and Seroquel seem to improve his " agitation so this will be continued.  He will have a follow-up next week in the clinic with HARI Vilchis and that appointment is set.  Family was advised to have him call or return sooner if any problems or questions arise.    Antonino Wilcox MD    Significant Results and Procedures   See above    Pending Results     Unresulted Labs Ordered in the Past 30 Days of this Admission     No orders found from 5/27/2021 to 6/27/2021.          Code Status   Full Code       Primary Care Physician   Ace Salter    Physical Exam   Temp: 98.4  F (36.9  C) Temp src: Tympanic BP: 122/61 Pulse: 87   Resp: 18 SpO2: 96 % O2 Device: None (Room air)    Vitals:    06/28/21 0200 07/01/21 0200 07/02/21 0037   Weight: 70.3 kg (155 lb) 72.3 kg (159 lb 6.4 oz) 67.4 kg (148 lb 9.6 oz)     Vital Signs with Ranges  Temp:  [97.1  F (36.2  C)-98.4  F (36.9  C)] 98.4  F (36.9  C)  Pulse:  [82-88] 87  Resp:  [18-20] 18  BP: (113-176)/(61-77) 122/61  SpO2:  [96 %-99 %] 96 %  I/O last 3 completed shifts:  In: 1983 [P.O.:1983]  Out: -       GENERAL: Comfortable, no apparent distress.  CARDIOVASCULAR: regular rate and rhythm, no murmur. No lower extremity edema   RESPIRATORY: Clear to auscultation bilaterally, no wheezes or crackles.  GI: non-tender, non-distended, normal bowel sounds.   SKIN: warm periphery, no rashes    Discharge Disposition   Discharged to home  Condition at discharge: Stable    Consultations This Hospital Stay   PHARMACY TO DOSE VANCO  PHARMACY TO DOSE VANCO  PHYSICAL THERAPY ADULT IP CONSULT  OCCUPATIONAL THERAPY ADULT IP CONSULT  SOCIAL WORK IP CONSULT  SWALLOW EVAL SPEECH PATH AT BEDSIDE IP CONSULT    Time Spent on this Encounter   I, Antonino Wilcox MD, personally saw the patient today and spent greater than 30 minutes discharging this patient.    Discharge Orders      Reason for your hospital stay    Fever due to presumed tick borne illness.     Follow-up and recommended labs and tests     Follow up with Johana  Brannon NORIEGA 7/9/21 @1100 at [facility]     Activity    Your activity upon discharge: activity as tolerated     Discharge Instructions     Full Code     Diet    Follow this diet upon discharge: Orders Placed This Encounter      Consistent Carbohydrate Diet 4879-8403 Calories: Moderate Consistent CHO (4-6 CHO units/meal) (NDD2 with thin liquids)     Discharge Medications   Current Discharge Medication List      START taking these medications    Details   !! canagliflozin (INVOKANA) 100 MG tablet Take 1 tablet (100 mg) by mouth every morning (before breakfast)  Qty: 30 tablet, Refills: 11    Associated Diagnoses: Controlled type 2 diabetes mellitus without complication, with long-term current use of insulin (H)      doxycycline hyclate (VIBRAMYCIN) 100 MG capsule Take 1 capsule (100 mg) by mouth every 12 hours for 4 days  Qty: 8 capsule, Refills: 0    Associated Diagnoses: Fever, unspecified fever cause; Tick-borne disease      nicotine (NICODERM CQ) 14 MG/24HR 24 hr patch Place 1 patch onto the skin daily  Qty: 30 patch, Refills: 1    Associated Diagnoses: Chewing tobacco use      QUEtiapine (SEROQUEL) 25 MG tablet Take 1 tablet (25 mg) by mouth 2 times daily  Qty: 60 tablet, Refills: 4    Associated Diagnoses: Senile dementia, uncomplicated (H); Agitation       !! - Potential duplicate medications found. Please discuss with provider.      CONTINUE these medications which have NOT CHANGED    Details   aspirin 81 MG chewable tablet Take 81 mg by mouth daily       calcium & magnesium carbonates 311-232 MG TABS Take 1 tablet by mouth daily      cyanocobalamin (VITAMIN B-12) 1000 MCG tablet Take 1 tablet (1,000 mcg) by mouth daily      glipiZIDE (GLUCOTROL) 10 MG tablet Take 1 tablet (10 mg) by mouth 2 times daily (before meals) In AM and PM -- and add 1/2 tablet daily with Lunch.  Qty: 180 tablet, Refills: 3    Associated Diagnoses: Controlled type 2 diabetes mellitus without complication, with  long-term current use of insulin (H)      losartan (COZAAR) 100 MG tablet Take 1 tablet (100 mg) by mouth daily  Qty: 90 tablet, Refills: 3    Associated Diagnoses: Benign essential hypertension      mesalamine (APRISO ER) 0.375 g 24 hr capsule Take 0.75 g by mouth 2 times daily      metFORMIN (GLUCOPHAGE) 1000 MG tablet Take 1,000 mg by mouth 2 times daily (with meals)       Multiple Vitamin (MULTI-VITAMINS) TABS Take 1 tablet by mouth daily      omeprazole (PRILOSEC OTC) 20 MG EC tablet Take 1 tablet (20 mg) by mouth daily  Qty: 90 tablet, Refills: 3    Associated Diagnoses: Adenocarcinoma of esophagus (H); Nausea      simvastatin (ZOCOR) 40 MG tablet Take 1 tablet (40 mg) by mouth At Bedtime  Qty: 90 tablet, Refills: 3    Associated Diagnoses: Mixed hyperlipidemia      !! canagliflozin (INVOKANA) 100 MG tablet Take 1 tablet (100 mg) by mouth every morning (before breakfast)  Qty: 30 tablet, Refills: 3    Associated Diagnoses: Uncontrolled type 2 diabetes mellitus with hyperglycemia (H); Hyperglycemia       !! - Potential duplicate medications found. Please discuss with provider.        Allergies   No Known Allergies  Data   Most Recent 3 CBC's:  Recent Labs   Lab Test 07/02/21  0602 06/29/21  0555 06/28/21  0530   WBC 3.9* 3.0* 3.4*   HGB 11.7* 12.0* 11.8*   MCV 90 90 90   * 91* 84*      Most Recent 3 BMP's:  Recent Labs   Lab Test 07/02/21  0602 07/01/21  0545 06/30/21  0530 06/29/21  0555 06/28/21  0530 06/28/21  0530 06/27/21  0540   NA  --   --   --  138  --  135 134   POTASSIUM 3.7 3.5 3.5 3.5   < > 3.4* 3.7   CHLORIDE  --   --   --  107  --  106 104   CO2  --   --   --  22  --  20* 21   BUN  --   --   --  8  --  9 15   CR  --   --   --  0.69*  --  0.66* 0.75   ANIONGAP  --   --   --  9  --  9 9   JOSELITO  --   --   --  8.3*  --  8.1* 8.2*   GLC  --   --   --  173*  --  162* 136*    < > = values in this interval not displayed.     Most Recent 2 LFT's:  Recent Labs   Lab Test 06/29/21  0555 06/26/21  3191    AST 22 31   ALT 22 39   ALKPHOS 35 51   BILITOTAL 0.7 1.3*     Most Recent INR's and Anticoagulation Dosing History:  Anticoagulation Dose History     Recent Dosing and Labs Latest Ref Rng & Units 6/16/2020 6/26/2021    INR 0.86 - 1.14 1.05 1.11        Most Recent 3 Troponin's:No lab results found.  Most Recent Cholesterol Panel:  Recent Labs   Lab Test 09/18/19  1517   CHOL 122   LDL 67   HDL 36   TRIG 97     Most Recent 6 Bacteria Isolates From Any Culture (See EPIC Reports for Culture Details):  Recent Labs   Lab Test 06/26/21  1731 06/26/21  1420 06/16/20  0935 05/07/20  1307   CULT No growth No growth after 6 days  No growth after 6 days No growth after 6 days  No growth after 6 days >100,000 colonies/mL  Staphylococcus aureus  *     Most Recent TSH, T4 and A1c Labs:  Recent Labs   Lab Test 06/14/21  1020   A1C 10.2*     Results for orders placed or performed during the hospital encounter of 06/26/21   XR Chest Port 1 View    Narrative    XR CHEST PORT 1 VIEW    HISTORY: 81 yearsMale Fever    TECHNIQUE: A single view of the chest was performed    COMPARISON: None    FINDINGS: Heart size and pulmonary vascularity are within normal  limits. Lungs are clear. No consolidating airspace opacities are  present.        Impression    IMPRESSION: Clear chest    BJORN JENKINS MD   CT Head w/o Contrast    Narrative    Exam: CT HEAD W/O CONTRAST    Clinical history:81 years Male Mental status change, unknown cause    Comparisons: 8/16/2020    Technique: Axial CT imaging of the head was performed Without  intervenous contrast.    FINDINGS:   Ventricles and sulci are symmetric. The gray-white matter  differentiation throughout the brain is well maintained. There is mild  periventricular white matter change of chronic small vessel ischemic  disease There is no evidence of intracranial mass or hemorrhage.  Visualized portions of the paranasal sinuses and mastoid air cells are  well aerated. There is no evidence of skull  fracture.      Impression    IMPRESSION: No acute intracranial findings.    BJORN JENKINS MD   MR Brain w/o & w Contrast    Narrative    EXAM:  MR BRAIN W/O & W CONTRAST    HISTORY:  Mental status change, unknown cause. .    TECHNIQUE:  Sagittal T1, axial T1, T2, FLAIR, diffusion, gradient as  well as axial and 3D postcontrast imaging of the whole brain was  performed.    MEDS/CONTRAST: 15ml dotarem    COMPARISON:  7/17/2020     FINDINGS:    Prominence of the ventricles and sulci is compatible with moderate to  advanced, generalized volume loss. No abnormal extra-axial collection,  hydrocephalus, mass effect or midline shift is seen. The basal  cisterns are preserved.    Periventricular predominant T2/FLAIR hyperintense signal within the  supratentorial white matter most likely reflects moderate chronic  microvascular ischemic change. No abnormal intracranial enhancement or  concerning T2* gradient susceptibility is identified. No restricted  diffusion is present.  The major intracranial vascular flow voids are  preserved.    The T1 marrow signal is unremarkable.       Impression    IMPRESSION: No evidence of an acute intracranial process. Moderate to  advanced volume loss and chronic microvascular ischemic changes.    ENZO WELDON MD   XR Chest Port 1 View    Narrative    XR CHEST PORT 1 VIEW    HISTORY: 81 yearsMale fever    TECHNIQUE: A single view of the chest was performed    COMPARISON: 6/26/2021    FINDINGS: Heart size and pulmonary vascularity are within normal  limits. Lungs are clear. No consolidating airspace opacities are  present.        Impression    IMPRESSION: Clear chest    BJORN JENKINS MD

## 2021-07-02 NOTE — PROGRESS NOTES
SAFETY CHECKLIST  ID Bands and Risk clasps correct and in place (DNR, Fall risk, Allergy, Latex, Limb):  Yes  All Lines Reconciled and labeled correctly: Yes  Whiteboard updated:Yes  Environmental interventions (bed/chair alarm on, call light, side rails, restraints, sitter....): Yes    Daniela Berg RN on 7/1/2021 at 7:31 PM

## 2021-07-02 NOTE — PROGRESS NOTES
Discharge Note      Data:  Vikas Ji discharged to home at 1520 via wheel chair. Accompanied by staff.    Action:  Written discharge/follow-up instructions were provided to patient and daughter. Prescriptions filled and sent with patient upon discharge. All belongings sent with patient.    Response:  Daughter verbalized understanding of discharge instructions, reason for discharge, and necessary follow-up appointments.

## 2021-07-02 NOTE — PHARMACY - DISCHARGE MEDICATION RECONCILIATION AND EDUCATION
Pharmacy:  Discharge Counseling and Medication Reconciliation    Vikas Ji  [address]  [facility] [address] [phone] (home)   81 year old male  PCP: Ace Salter    Allergies: Patient has no known allergies.    Discharge Counseling:    Pharmacist met with patient (and/or family) today to review the medication portion of the After Visit Summary (with an emphasis on NEW medications) and to address patient's questions/concerns.    Summary of Education: Spoke with patient's daughter regarding new medications of Doxycycline and Quetiapine, including dose, administration, indication, and possible common side effects. Per daughter, patient not interested in tobacco cessation products- will not  nicotine patch.    Materials Provided:  MedCounselor sheets printed from Clinical Pharmacology on: Doxycycline and Quetiapine. Refused materials on Nicotine transdermal.    Discharge Medication Reconciliation:    It has been determined that the patient has an adequate supply of medications available or which can be obtained from the patient's preferred pharmacy, which daughter has confirmed as: [facility]- receipts of medications confirmed by daughter prior to discharge.    Thank you for the consult.    Sky Busby [facility]........July 2, 2021 3:06 PM

## 2021-07-02 NOTE — PROGRESS NOTES
07/02/21 1432   Signing Clinician's Name / Credentials   Signing clinician's name / credentials Angelique Jaquez OTR/L    Quick Adds   Rehab Discipline OT   Gait Training   Symptoms Noted During/After Treatment (Gait Training) fatigue   [place] Level (Gait Training) contact guard   Physical Assistance Level (Gait Training) 1 person assist   Assistive Device (Gait Training) rolling walker   OT Discharge Planning    OT Discharge Recommendation (DC Rec) Home with assist   OT Rationale for DC Rec pt is planning to D/C home today with family assist as needed    OT Brief overview of current status  Pt pleasant, mildly pleasantly confused but able to follow directions well.    Additional Documentation   OT Plan d/c home otfay with family supervision and assist    Total Session Time   Total Session Time (minutes) 15 minutes

## 2021-07-02 NOTE — PROGRESS NOTES
Clinical Nutrition/Initial Assessment     Reason for Assessment:   Length of stay    Assessment:   Client History:  Pt awaiting placement. Appetite has been good.   Diet Order:  Consistent carb, NDD 2 with thin liquids  Oral Intake:  100% most meals    Intervention:  Continue with current plan, nutritionally stable.     Monitoring and Evaluation:   Intakes, weights, labs     Discharge Recommendation:   Nutrition Discharge Planning  texture per speech therapy recommendations. Otherwise, do not anticipate nutritional interventions needed on discharge.    Follow up weekly. If needed sooner, please order consult.     Huong Quintero RD on 7/2/2021 at 8:47 AM

## 2021-07-02 NOTE — PLAN OF CARE
Physical Therapy Discharge Summary    Reason for therapy discharge:    Discharged to home.    Progress towards therapy goal(s). See goals on Care Plan in [facility] electronic health record for goal details.  Goals met    Therapy recommendation(s):    No further therapy is recommended.

## 2021-07-02 NOTE — PLAN OF CARE
"Patient is disoriented to time and situation. Easily redirected. Calm and cooperative. Denies pain. Lung sounds are clear throughout lobes. HS blood sugar was 296. Novolog administered per MAR. Follow up blood sugar was 272. Bowel sounds are audible and active. Patient ambulates in room with a stand-by assist. Voids frequently. Incontinent of urine at times.  Vital signs:  Temp: 97.8  F (36.6  C) Temp src: Tympanic BP: 113/65 Pulse: 85   Resp: 18 SpO2: 98 % O2 Device: None (Room air)     Weight: 67.4 kg (148 lb 9.6 oz)  Estimated body mass index is 22.76 kg/m  as calculated from the following:    Height as of 6/14/21: 1.721 m (5' 7.75\").    Weight as of this encounter: 67.4 kg (148 lb 9.6 oz).        Daniela Berg RN on 7/2/2021 at 4:56 AM    "

## 2021-07-02 NOTE — PLAN OF CARE
Patient was pleasant. Vitals are stable. Sitter was pulled and patient was doing well with out the sitter. Patient would put his call light on,  then the patient would get up. The patient was very nice all day to staff. Patient's family wanted top take the patient home today. Plan is to discharge later today.

## 2021-07-05 NOTE — PROGRESS NOTES
Transitional Care Management Phone Call    Summary of hospitalization:  [facility] discharge summary reviewed    DISCHARGE DIAGNOSIS: fever, agitation    DATE OF DISCHARGE: 07-    Diagnostic Tests/Treatments reviewed.  Follow up needed: none    Post Discharge Medication Reconciliation: discharge medications reconciled and changed, per note/orders.  Patient will not use the nicotine patch, will continue to chew tobacco. Not ready to stop per daughterNirmala.     Medications reviewed by: Myself and daughterNirmala    Problems taking medications regularly:  None    Problems adhering to non-medication therapy:  None    Other Healthcare Providers Involved in Patient s Care:         None     Update since discharge: improved. Per wifeGail, symptoms are improved. Still at baseline with confusion and night time waking.  Per daughter, Nirmala, patient is much better. No other needs right now.     Plan of care communicated with patient and family  Phone acted up with wife, called and finished medications with daughterNirmala.     Just a friendly reminder that you appointment is     Next 5 appointments (look out 90 days)    Jul 09, 2021 11:00 AM  Office Visit with Ashanti South PA-C  [facility] ([facility] ) [address]  [place] [address] [phone]      .  We encourage you to keep this appointment.    Please remember to bring all of your pills in their bottles (including any vitamins or over the counter pills) with you to your appointment.   The patient indicates understanding of these issues and agrees with the plan of care.   Yes, plans to follow plan of care and keep the scheduled appointment.    Was the patient contacted within the 2 business days or other approved timeframe?  Yes     Was the Medication reconciliation and management done since the patient was discharged? Yes     [employer]  SCREENING QUESTIONS  Hunger Vital Signs:  Within the past 12 months we worried whether our food would run out before we got money to buy more. Never  Within the past 12 months the food we bought just didn't last and we didn't have money to get more. Never  Angelique Fuller RN 7/5/2021 1:19 PM      Angelique Fuller RN  7/5/2021 1:15 PM

## 2021-09-09 NOTE — NURSING NOTE
"Chief Complaint   Patient presents with     Recheck Medication     tired, agitated   Patient presents to clinic for follow up on medications, labs if needed. More confused, tired, agitated lately, \"seems off\".    Initial /54 (BP Location: Right arm, Patient Position: Sitting, Cuff Size: Adult Regular)   Pulse 94   Resp 18   Wt 67 kg (147 lb 9.6 oz)   SpO2 98%   BMI 22.61 kg/m   Estimated body mass index is 22.61 kg/m  as calculated from the following:    Height as of 6/14/21: 1.721 m (5' 7.75\").    Weight as of this encounter: 67 kg (147 lb 9.6 oz).  Medication Reconciliation: complete    DEVIN DAMON, LPN  "

## 2021-09-09 NOTE — PROGRESS NOTES
"Chief Complaint   Patient presents with     Recheck Medication     tired, agitated         HPI: Mr. Ji is a 81 year old male who presents today for concern for confusion and sleepiness.    He has had some sleepiness and agitation.  Some speech issues.  Today he had bowel incontinence.  He was recently started on Seroquel.  He has been taking this in the morning.    He has a history of type 2 diabetes mellitus.  He is due for recheck of his A1c.  His daughter-in-law does express concern for possible elevation of his blood sugars.    He  reports that he quit smoking about 34 years ago. His smoking use included cigarettes. He has a 18.00 pack-year smoking history. His smokeless tobacco use includes chew.    Past medical history reviewed as below:     Past Medical History:   Diagnosis Date     Dementia (H)      HLD (hyperlipidemia)      HTN (hypertension)      Hypomagnesemia     9/10/2015     Other abnormalities of gait and mobility     10/6/2015     Type 2 diabetes mellitus with hyperglycemia (H)     9/10/2015   .      ROS  Pertinent ROS was performed and was negative, including for fever, chills, chest pain, shortness of breath, increased lower extremity edema, changes in bowel or bladder. No other concerns, with exception of HPI above.      EXAM:   /54 (BP Location: Right arm, Patient Position: Sitting, Cuff Size: Adult Regular)   Pulse 94   Resp 18   Wt 67 kg (147 lb 9.6 oz)   SpO2 98%   BMI 22.61 kg/m      Estimated body mass index is 22.61 kg/m  as calculated from the following:    Height as of 6/14/21: 1.721 m (5' 7.75\").    Weight as of this encounter: 67 kg (147 lb 9.6 oz).      GEN: Vitals reviewed. Healthy appearing. Patient is in no acute distress. Cooperative with exam.  HEENT: Normocephalic atraumatic.  Eyes grossly normal to inspection.  No discharge or erythema, or obvious scleral/conjunctival abnormalities.   NECK: Supple; no thyromegaly or masses noted.  No cervical or supraclavicular " lymphadenopathy.  CV: Heart regular in rate and rhythm with no murmur.    LUNGS: No audible wheeze, cough, or visible cyanosis.  No visible retractions or increased work of breathing.  Lungs clear to auscultation bilaterally.    ABD: Nondistended  SKIN: Warm and dry to touch.  Visible skin clear. No significant rash, abnormal pigmentation or lesions.  EXT: No clubbing or cyanosis.  No peripheral edema.  PSYCH: Mood is good.  Mentation appears normal, affect normal/bright, judgement and insight intact, normal speech and appearance well-groomed.     ASSESSMENT AND PLAN:    Confusion  -Exact etiology of his confusion is unknown however I suspect it is either progression of his underlying dementia or medication effect.  Extensive labs were obtained and overall fairly normal other than a mild decrease in hemoglobin which was stable and mild hyponatremia.  He was noted to have just a minimal increase in his calcium although this would be unlikely to cause significant confusion.  - Comprehensive Metabolic Panel  - CBC and Differential  - Vitamin B12  - TSH Reflex GH  - Albumin Random Urine Quantitative with Creat Ratio  - UA reflex to Microscopic and Culture    Uncontrolled type 2 diabetes mellitus with hyperglycemia (H)  -Stable from prior and adequately controlled for his age and comorbidities  - Hemoglobin A1c  - Albumin Random Urine Quantitative with Creat Ratio  - UA reflex to Microscopic and Culture    Hyponatremia  Minimal, continue to monitor    Hypercalcemia  Minimal, continue to monitor    Anemia, unspecified type  -Overall stable from prior    Senile dementia, uncomplicated (H)       Follow-up as needed       FERDINAND JAMES,    9/9/2021 3:06 PM

## 2021-09-10 NOTE — TELEPHONE ENCOUNTER
Called back, needing to know if pt is going to need a antibiotic and doesn't want to have to wait until Monday

## 2021-09-10 NOTE — TELEPHONE ENCOUNTER
The patient's daughter in law called regarding test results so if the patient needs any antibiotics they could get them before the weekend.  Please advise.

## 2021-09-10 NOTE — RESULT ENCOUNTER NOTE
Please let patient know his results have returned.  Urine does not show an infection.  Call if he has any questions.

## 2021-10-05 NOTE — ADDENDUM NOTE
Addended by: SHERRIE MCGOVERN on: 10/5/2021 10:31 AM     Modules accepted: Orders, Level of Service, SmartSet

## 2021-10-07 NOTE — TELEPHONE ENCOUNTER
Routing refill request to provider for review/approval because:  Medication is reported/historical    LOV: 9/9/2021    Aide Sykes RN on 10/7/2021 at 8:26 AM

## 2021-10-07 NOTE — TELEPHONE ENCOUNTER
Overdue for Annual Medicare wellness visit / physical + Diabetes labs.     Please call patient and schedule.  Okay to use 4 PM -- 40 minute spot at the end of the day if needed. + lab appointment before clinic appointment.     We can send in a small Prescription refill if needed to get to appointment.     Ace Salter MD

## 2021-10-08 PROBLEM — Z66 DNR (DO NOT RESUSCITATE): Status: ACTIVE | Noted: 2021-01-01

## 2021-10-13 NOTE — PROGRESS NOTES
Vikas Ji is a 81 year old male being seen today for follow up visit visit at [facility] unit.    Code Status: DNR / DNI.   Health Care Power of : Extended Emergency Contact Information  Primary Emergency Contact: Goodell, Brenda  Home Phone: [phone]  Work Phone: [phone]  Mobile Phone: [phone]  Relation: Daughter  Secondary Emergency Contact: Gail Ji   [place]  Mobile Phone: [phone]  Relation: Spouse     Allergies: Patient has no known allergies.     Chief Complaint / HPI: Recent admission to [facility] memory unit from [facility] where wife was caregiver until recently  Dementia progressing to the extent that his wife is no longer able to care for him at home.  Recent office visit in clinic with Dr Parker, had updated labs including  A1c  Which is at goal for age and health history.  History of elopement attempt due to confusion--easily redirected. Seems to be acclimating to unit. Staff report sleeping well  And social with residents and staff.  Staff raise no concerns.            Documentation of Face-to-Face and Certification for Home Health Services     Patient: Vikas Ji   YOB: 1940  MR Number: [id number]  Today's Date: 10/13/2021    I certify that patient: Vikas Ji is under my care and that I, or a nurse practitioner or physician's assistant working with me, had a face-to-face encounter that meets the physician face-to-face encounter requirements with this patient on: 10/13/2021.    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    My clinical findings support the need for the above services because: Nurse is needed: To provide assessment and oversight required in the home to assure adherence to the medical plan due to: dementia.., Occupational Therapy Services are needed to assess and treat cognitive ability and address ADL safety due to impairment in cognition., Physical Therapy Services are needed to assess and treat the following functional impairments: mobility, gait and balance dysfunction, walker training. and Speech Therapy Services are needed to assess and treat impairments in language and/or swallow functions due to history throat cancer.    Further, I certify that my clinical findings support that this patient is homebound (i.e. absences from home require considerable and taxing effort and are for medical reasons or [employer] services or infrequently or of short duration when for other reasons) because: Requires assistance of another person or specialized equipment to access medical services because patient: Is prone to wander/get lost without assistance., Is unable to exit home safely on own due to: memory loss., Range of motion limitations prevents ability to exit home safely. and Requires supervision of another for safe transfer...    Based on the above findings. I certify that this patient is confined to the home and needs intermittent skilled nursing care, physical therapy and/or speech therapy.  The patient is under my care, and I have initiated the establishment of the plan of care.  This patient will be followed by a physician who will periodically review the plan of care.  Physician/Provider to provide follow up care: Ace Salter    Responsible Medicare certified ABE Physician: Dr Salter  Physician Signature: See electronic signature associated with these discharge orders.  Date: 10/13/2021      Past Medical, Surgical, Family and Social History  reviewed: YES.     Medications: reviewed and reconciled  Medications - recent changes:     Review of Systems:  General: positive for weakness  Musculoskeletal: positive for muscular weakness  Psychiatric: positive for memory loss  All other systems negative    Toileting:    Continent of Bowel: Yes   Continent of Bladder: Yes  Mobility: walker    Recent Labs: reviewed      Current Therapies: none    Exam:  Vital signs reviewed.  GENERAL APPEARANCE:  alert and no distress  EYES: Eyes grossly normal to inspection,conjunctivae and sclerae normal  NECK: no adenopathy, no asymmetry  RESP: lungs clear   MS: no musculoskeletal defects are noted and gait is age appropriate with walker  SKIN: warm and dry  PSYCH: Alert, pleasantly confused    Assessment and Plan:    Therapy will start services within the week which will be beneficial for maintaining mobility and optimizing independence.    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

## 2021-10-15 NOTE — TELEPHONE ENCOUNTER
"[facility] sent Rx request for the following:      Requested Prescriptions   Pending Prescriptions Disp Refills     losartan (COZAAR) 100 MG tablet [Pharmacy Med Name: losartan 100 mg tablet] 90 tablet 0     Sig: Take 1 tablet (100 mg) by mouth daily       Angiotensin-II Receptors Passed - 10/14/2021 12:58 PM        Passed - Last blood pressure under 140/90 in past 12 months     BP Readings from Last 3 Encounters:   09/09/21 108/54   07/02/21 122/61   06/14/21 124/56                 Passed - Recent (12 mo) or future (30 days) visit within the authorizing provider's specialty     Patient has had an office visit with the authorizing provider or a provider within the authorizing providers department within the previous 12 mos or has a future within next 30 days. See \"Patient Info\" tab in inbasket, or \"Choose Columns\" in Meds & Orders section of the refill encounter.              Passed - Medication is active on med list        Passed - Patient is age 18 or older        Passed - Normal serum creatinine on file in past 12 months     Recent Labs   Lab Test 09/09/21  1520   CR 0.90       Ok to refill medication if creatinine is low          Passed - Normal serum potassium on file in past 12 months     Recent Labs   Lab Test 09/09/21  1520   POTASSIUM 5.0                 Last Prescription Date:   10/15/2021  Last Fill Qty/Refills:         30, R-0  Last Office Visit:              9/9/2021   Future Office visit:           None noted    Redundant refill request refused: Too soon:  Unable to complete prescription refill per RN Medication Refill Policy.                       omeprazole (PRILOSEC) 20 MG DR capsule [Pharmacy Med Name: omeprazole 20 mg capsule,delayed release] 90 capsule 3     Sig: TAKE 1 CAPSULE BY MOUTH DAILY       PPI Protocol Failed - 10/14/2021 12:58 PM        Failed - Medication is active on med list        Passed - Not on Clopidogrel (unless Pantoprazole ordered)        Passed - No diagnosis of " "osteoporosis on record        Passed - Recent (12 mo) or future (30 days) visit within the authorizing provider's specialty     Patient has had an office visit with the authorizing provider or a provider within the authorizing providers department within the previous 12 mos or has a future within next 30 days. See \"Patient Info\" tab in inbasket, or \"Choose Columns\" in Meds & Orders section of the refill encounter.              Passed - Patient is age 18 or older             Last Prescription Date:   10/15/2021  Last Fill Qty/Refills:         30, R-0  Last Office Visit:              9/9/2021   Future Office visit:           None noted      Redundant refill request refused: Too soon:  Unable to complete prescription refill per RN Medication Refill Policy.       Beatriz Barlow RN on 10/15/2021 at 10:10 AM    "

## 2021-10-20 PROBLEM — Z91.89 AT HIGH RISK FOR ELOPEMENT: Status: ACTIVE | Noted: 2021-01-01

## 2021-10-20 PROBLEM — Z92.3 HISTORY OF RADIATION THERAPY: Status: ACTIVE | Noted: 2021-01-01

## 2021-10-20 PROBLEM — K51.90 ULCERATIVE COLITIS WITHOUT COMPLICATIONS, UNSPECIFIED LOCATION (H): Status: ACTIVE | Noted: 2021-01-01

## 2021-10-20 NOTE — PROGRESS NOTES
"Vikas Ji is a 81 year old male being seen today for comprehensive and follow up visit visit at [facility] memory unit.    Code Status: DNR / DNI.   Health Care Power of : Extended Emergency Contact Information  Primary Emergency Contact: Goodell, Brenda  Home Phone: [phone]  Work Phone: [phone]  Mobile Phone: [phone]  Relation: Daughter  Secondary Emergency Contact: Gail Ji   [place]  Mobile Phone: [phone]  Relation: Spouse     Allergies: Patient has no known allergies.     Chief Complaint / HPI: Follow-up on medications, with recent admission 1 week ago to memory unit previously living at home wife was primary caretaker  However due to advancing dementia wife was unable to manage at home.  Staff report first couple of days patient seemed happy, redirectable  However developed anxiety, paranoid delusions where he was concerned the staff are trying to poison him with his medications--and elopement seeking  Was waiting near secured memory unit door and a maintenance staff member had mistaken him for a visitor and patient left the unit outdoors--daughter who is a nurse that works across the street was able to come over and talk him down and redirect him back to his unit by recommending that \"he eat his lunch\".  Staff also report other elopement seeking behaviors trying to climb out of the windows.  Patient is on Seroquel 25 mg twice daily--spoke with daughter today and she reports this was left over from a recent hospitalization for delirium.  [facility]--- recent [facility] provider yearly medication management visit in March 2021 in [place].  Daughter is uncertain if patient is on community VA management program where local primary care providers are able to prescribe and refill medications directly through [facility] pharmacy.    Staff have concerns and would like to reduce some of his medications--particularly vitamin supplements " and statin medications.  He is on 3 oral diabetes medications at this time as his daughter reports he was previously on Lantus but did not like the injections--he was giving himself injections at home and wife was unable to manage his agitation.  Recent A1c in September 8%--- in reviewing past weights has lost 20 pounds since 2020 and 10 pounds over the last 4 months.  Staff report has mild dysphagia secondary to history of esophageal adenocarcinoma  Was following up with oncology, had palliative radiation therapy.  Awaiting speech therapy evaluation for dietary recommendations.  Staff do not report any  Episodes of coughing, choking vomiting.  Appearance apparent emaciated.    Phone call with daughter Nirmala who is medical power of --- she endorses and reiterates that her father is a DO NOT RESUSCITATE DO NOT INTUBATE with comfort measures only status--- POLST was signed on admission to memory unit.  He is not a candidate for surgery and family do not want any further oncology follow-ups and would like to focus on comfort care and quality of life.  Discussed trial of Seroquel twice daily to 3 times daily, and addition of low-dose sertraline to moderate anxiety and panic attacks.  Daughter is very agreeable.    Daughter does not want to change diabetic medications at this time--which is reasonable however we discussed after Vikas has had time to acclimate and settling to memory unit and routine--- would like to follow-up on possibly adjusting medications to Lantus once daily--- and I would like to look into eligibility for a glucose monitoring system--- his daughter does not feel he would tolerate a Dexcom or freestyle kennedy at this time if he was eligible.  She would like the staff to check his blood sugars--- will need glucose meter and testing supplies for daily and as needed checks.    Daughter seems to feel he has tolerated his Metformin well with his ulcerative colitis history and is questioning if he  needs to continue his maintenance medication.     Past Medical, Surgical, Family and Social History reviewed: YES.     Medications: reviewed and reconciled  Medications - recent changes: none    Review of Systems:  General: positive for weakness  GI: positive for ulcerative colitis, esophageal cancer  : positive for incontinence  Musculoskeletal: positive for muscular weakness  Psychiatric: positive for anxiety and agitation  Hematologic: positive for weight loss  Endocrine: positive for diabetes  All other systems negative  Toileting:    Continent of Bowel: Yes   Continent of Bladder: No  Mobility: ambulatory--has walker forgets to use    Recent Labs: reviewed recent labs during 9/9/21 office visit      Current Therapies: Home care PT/OT/SLP/SN/HHA services    Exam:  Vital signs reviewed.   GENERAL APPEARANCE:  alert and no distress  EYES: Eyes grossly normal to inspection, conjunctivae and sclerae normal  RESP: lungs clear   CV: regular rates and rhythm, normal S1 S2, no S3 or S4, no murmur, click or rub, no peripheral edema  ABDOMEN: soft, nontender  MS: no musculoskeletal defects are noted and gait is age appropriate without ataxia  SKIN: warm and dry  PSYCH: Alert pleasantly confused.    Assessment and Plan:      Discussed increasing Seroquel 25 mg from twice daily to 3 times daily--with addition of sertraline daily to moderate anxiety and support acclimating and settling into secure dementia unit and routine with Dr Salter his PCP who agrees with plan.  It would be helpful if family members would avoid or limit visits as this seems to ramp him up and he wants to elope out of the unit.    Will discontinue vitamin supplements and statin--daughter is very agreeable with this and would like to reduce as many medications as possible    Staff will continue to use behavioral interventions along with medication for redirection and to encourage settling into new residence.    Daughter will contact [facility] clinic  to ascertain if we are able to refill and adjust medications through [facility] pharmacy electronically.  Daughter realizes that father is not going to be able to attend any further VA face-to-face yearly visits for medication management due to advanced dementia.  This could be managed through telehealth if required by VA system--as medications are through VA and there is no secondary insurance coverage.    Daughter gave contact for [facility]--Christine [phone] direct to office as needed for care coordination.    Will follow-up in a couple of weeks on progress--sooner as needed  No labs indicated at this time--- Next A1c due in December    Staff will continue to encourage intake and nutritional supplements for calories and follow speech therapy recommendations for safety        (G30.8,  F02.81) Alzheimer's dementia of other onset with behavioral disturbance (H)  (primary encounter diagnosis)      (R45.1) Agitation    Plan: QUEtiapine (SEROQUEL) 25 MG tablet            (F41.1) LEYDA (generalized anxiety disorder)    Plan: QUEtiapine (SEROQUEL) 25 MG tablet, sertraline         (ZOLOFT) 25 MG tablet            (E11.9) DM type 2, not at goal (H)    Plan: blood glucose monitoring (NO BRAND SPECIFIED)         meter device kit, blood glucose (NO BRAND         SPECIFIED) test strip, blood glucose         calibration (NO BRAND SPECIFIED) solution, thin        (NO BRAND SPECIFIED) lancets, alcohol swab prep        pads            (Z85.819) History of throat cancer      (Z79.899) Encounter for medication review      (R63.4) Weight loss      (Z92.3) History of radiation therapy      (Z91.89) At high risk for elopement      (K51.90) Ulcerative colitis without complications, unspecified location (H)

## 2021-11-02 NOTE — TELEPHONE ENCOUNTER
Dr Salter reviewed and completed the following home care form for Vikas Ji on 11/002/21   This covers the certification period effective 10/19/21 to 12/17/2021.  Fabiola Theodore LPN on 11/2/2021 at 10:07 AM

## 2021-11-02 NOTE — TELEPHONE ENCOUNTER
Dr Salter reviewed and completed the following home care form for Vikas Ji on 11/2/21   This covers the certification period effective 10/19/21 to 12/17/21.  Fabiola Theodore LPN on 11/2/2021 at 3:56 PM

## 2022-01-01 ENCOUNTER — MEDICAL CORRESPONDENCE (OUTPATIENT)
Dept: HEALTH INFORMATION MANAGEMENT | Facility: OTHER | Age: 82
End: 2022-01-01
Payer: COMMERCIAL

## 2022-01-01 ENCOUNTER — HOSPITAL ENCOUNTER (EMERGENCY)
Facility: OTHER | Age: 82
Discharge: HOME | End: 2022-02-06
Attending: EMERGENCY MEDICINE | Admitting: EMERGENCY MEDICINE
Payer: COMMERCIAL

## 2022-01-01 ENCOUNTER — MEDICAL CORRESPONDENCE (OUTPATIENT)
Dept: HEALTH INFORMATION MANAGEMENT | Facility: OTHER | Age: 82
End: 2022-01-01

## 2022-01-01 ENCOUNTER — HOSPITAL ENCOUNTER (EMERGENCY)
Facility: OTHER | Age: 82
Discharge: HOME | End: 2022-01-16
Attending: EMERGENCY MEDICINE | Admitting: EMERGENCY MEDICINE
Payer: COMMERCIAL

## 2022-01-01 ENCOUNTER — NURSING HOME VISIT (OUTPATIENT)
Dept: GERIATRICS | Facility: OTHER | Age: 82
End: 2022-01-01
Attending: NURSE PRACTITIONER
Payer: COMMERCIAL

## 2022-01-01 ENCOUNTER — TELEPHONE (OUTPATIENT)
Dept: FAMILY MEDICINE | Facility: OTHER | Age: 82
End: 2022-01-01
Payer: COMMERCIAL

## 2022-01-01 ENCOUNTER — APPOINTMENT (OUTPATIENT)
Dept: CT IMAGING | Facility: OTHER | Age: 82
End: 2022-01-01
Attending: EMERGENCY MEDICINE
Payer: COMMERCIAL

## 2022-01-01 VITALS
TEMPERATURE: 96.1 F | SYSTOLIC BLOOD PRESSURE: 120 MMHG | BODY MASS INDEX: 21.03 KG/M2 | OXYGEN SATURATION: 99 % | HEIGHT: 67 IN | HEART RATE: 95 BPM | WEIGHT: 134 LBS | RESPIRATION RATE: 16 BRPM | DIASTOLIC BLOOD PRESSURE: 48 MMHG

## 2022-01-01 VITALS
OXYGEN SATURATION: 99 % | TEMPERATURE: 97.6 F | WEIGHT: 134.7 LBS | HEART RATE: 80 BPM | DIASTOLIC BLOOD PRESSURE: 78 MMHG | SYSTOLIC BLOOD PRESSURE: 114 MMHG | BODY MASS INDEX: 20.63 KG/M2 | RESPIRATION RATE: 19 BRPM

## 2022-01-01 VITALS — DIASTOLIC BLOOD PRESSURE: 64 MMHG | WEIGHT: 124.6 LBS | BODY MASS INDEX: 19.52 KG/M2 | SYSTOLIC BLOOD PRESSURE: 101 MMHG

## 2022-01-01 DIAGNOSIS — Z79.899 ENCOUNTER FOR MEDICATION REVIEW: Primary | ICD-10-CM

## 2022-01-01 DIAGNOSIS — R45.1 AGITATION: ICD-10-CM

## 2022-01-01 DIAGNOSIS — R63.4 WEIGHT LOSS: ICD-10-CM

## 2022-01-01 DIAGNOSIS — F02.818 ALZHEIMER'S DEMENTIA OF OTHER ONSET WITH BEHAVIORAL DISTURBANCE: ICD-10-CM

## 2022-01-01 DIAGNOSIS — Z92.3 HISTORY OF RADIATION THERAPY: ICD-10-CM

## 2022-01-01 DIAGNOSIS — I10 BENIGN ESSENTIAL HYPERTENSION: ICD-10-CM

## 2022-01-01 DIAGNOSIS — R11.0 NAUSEA: ICD-10-CM

## 2022-01-01 DIAGNOSIS — F02.818 DEMENTIA DUE TO MEDICAL CONDITION WITH BEHAVIORAL DISTURBANCE (H): ICD-10-CM

## 2022-01-01 DIAGNOSIS — G30.8 ALZHEIMER'S DEMENTIA OF OTHER ONSET WITH BEHAVIORAL DISTURBANCE: ICD-10-CM

## 2022-01-01 DIAGNOSIS — E11.65 UNCONTROLLED TYPE 2 DIABETES MELLITUS WITH HYPERGLYCEMIA (H): ICD-10-CM

## 2022-01-01 DIAGNOSIS — R73.9 HYPERGLYCEMIA: Primary | ICD-10-CM

## 2022-01-01 DIAGNOSIS — F41.1 GAD (GENERALIZED ANXIETY DISORDER): ICD-10-CM

## 2022-01-01 DIAGNOSIS — E11.9 TYPE 2 DIABETES MELLITUS WITHOUT COMPLICATION, UNSPECIFIED WHETHER LONG TERM INSULIN USE (H): ICD-10-CM

## 2022-01-01 DIAGNOSIS — C15.9 ADENOCARCINOMA OF ESOPHAGUS (H): ICD-10-CM

## 2022-01-01 DIAGNOSIS — K52.9 GASTROENTERITIS: ICD-10-CM

## 2022-01-01 DIAGNOSIS — R40.4 TRANSIENT ALTERATION OF AWARENESS: ICD-10-CM

## 2022-01-01 DIAGNOSIS — Z71.89 GOALS OF CARE, COUNSELING/DISCUSSION: ICD-10-CM

## 2022-01-01 DIAGNOSIS — Z51.5 PALLIATIVE CARE PATIENT: ICD-10-CM

## 2022-01-01 DIAGNOSIS — R53.81 DECLINING FUNCTIONAL STATUS: ICD-10-CM

## 2022-01-01 DIAGNOSIS — C15.9 ADENOCARCINOMA OF ESOPHAGUS (H): Primary | ICD-10-CM

## 2022-01-01 LAB
ALBUMIN SERPL-MCNC: 4.5 G/DL (ref 3.5–5.7)
ALBUMIN SERPL-MCNC: 4.6 G/DL (ref 3.5–5.7)
ALBUMIN UR-MCNC: NEGATIVE MG/DL
ALBUMIN UR-MCNC: NEGATIVE MG/DL
ALP SERPL-CCNC: 32 U/L (ref 34–104)
ALP SERPL-CCNC: 40 U/L (ref 34–104)
ALT SERPL W P-5'-P-CCNC: 5 U/L (ref 7–52)
ALT SERPL W P-5'-P-CCNC: 5 U/L (ref 7–52)
ANION GAP SERPL CALCULATED.3IONS-SCNC: 12 MMOL/L (ref 3–14)
ANION GAP SERPL CALCULATED.3IONS-SCNC: 13 MMOL/L (ref 3–14)
APPEARANCE UR: CLEAR
APPEARANCE UR: CLEAR
AST SERPL W P-5'-P-CCNC: 11 U/L (ref 13–39)
AST SERPL W P-5'-P-CCNC: 11 U/L (ref 13–39)
ATRIAL RATE - MUSE: 92 BPM
BASOPHILS # BLD AUTO: 0 10E3/UL (ref 0–0.2)
BASOPHILS # BLD AUTO: 0 10E3/UL (ref 0–0.2)
BASOPHILS NFR BLD AUTO: 1 %
BASOPHILS NFR BLD AUTO: 1 %
BILIRUB SERPL-MCNC: 0.3 MG/DL (ref 0.3–1)
BILIRUB SERPL-MCNC: 0.7 MG/DL (ref 0.3–1)
BILIRUB UR QL STRIP: NEGATIVE
BILIRUB UR QL STRIP: NEGATIVE
BUN SERPL-MCNC: 15 MG/DL (ref 7–25)
BUN SERPL-MCNC: 25 MG/DL (ref 7–25)
CALCIUM SERPL-MCNC: 10 MG/DL (ref 8.6–10.3)
CALCIUM SERPL-MCNC: 10.1 MG/DL (ref 8.6–10.3)
CHLORIDE BLD-SCNC: 101 MMOL/L (ref 98–107)
CHLORIDE BLD-SCNC: 104 MMOL/L (ref 98–107)
CO2 SERPL-SCNC: 23 MMOL/L (ref 21–31)
CO2 SERPL-SCNC: 24 MMOL/L (ref 21–31)
COLOR UR AUTO: ABNORMAL
COLOR UR AUTO: YELLOW
CREAT SERPL-MCNC: 0.89 MG/DL (ref 0.7–1.3)
CREAT SERPL-MCNC: 0.93 MG/DL (ref 0.7–1.3)
DIASTOLIC BLOOD PRESSURE - MUSE: NORMAL MMHG
EOSINOPHIL # BLD AUTO: 0.1 10E3/UL (ref 0–0.7)
EOSINOPHIL # BLD AUTO: 0.1 10E3/UL (ref 0–0.7)
EOSINOPHIL NFR BLD AUTO: 3 %
EOSINOPHIL NFR BLD AUTO: 4 %
ERYTHROCYTE [DISTWIDTH] IN BLOOD BY AUTOMATED COUNT: 12.8 % (ref 10–15)
ERYTHROCYTE [DISTWIDTH] IN BLOOD BY AUTOMATED COUNT: 13.1 % (ref 10–15)
GFR SERPL CREATININE-BSD FRML MDRD: 82 ML/MIN/1.73M2
GFR SERPL CREATININE-BSD FRML MDRD: 85 ML/MIN/1.73M2
GLUCOSE BLD-MCNC: 163 MG/DL (ref 70–105)
GLUCOSE BLD-MCNC: 174 MG/DL (ref 70–105)
GLUCOSE UR STRIP-MCNC: >1000 MG/DL
GLUCOSE UR STRIP-MCNC: >1000 MG/DL
HCT VFR BLD AUTO: 34.4 % (ref 40–53)
HCT VFR BLD AUTO: 38.3 % (ref 40–53)
HGB BLD-MCNC: 11.6 G/DL (ref 13.3–17.7)
HGB BLD-MCNC: 12.9 G/DL (ref 13.3–17.7)
HGB UR QL STRIP: ABNORMAL
HGB UR QL STRIP: ABNORMAL
HYALINE CASTS: 3 /LPF
IMM GRANULOCYTES # BLD: 0 10E3/UL
IMM GRANULOCYTES # BLD: 0 10E3/UL
IMM GRANULOCYTES NFR BLD: 0 %
IMM GRANULOCYTES NFR BLD: 0 %
INTERPRETATION ECG - MUSE: NORMAL
KETONES UR STRIP-MCNC: 10 MG/DL
KETONES UR STRIP-MCNC: ABNORMAL MG/DL
LEUKOCYTE ESTERASE UR QL STRIP: NEGATIVE
LEUKOCYTE ESTERASE UR QL STRIP: NEGATIVE
LYMPHOCYTES # BLD AUTO: 0.6 10E3/UL (ref 0.8–5.3)
LYMPHOCYTES # BLD AUTO: 0.7 10E3/UL (ref 0.8–5.3)
LYMPHOCYTES NFR BLD AUTO: 17 %
LYMPHOCYTES NFR BLD AUTO: 18 %
MAGNESIUM SERPL-MCNC: 1.8 MG/DL (ref 1.9–2.7)
MCH RBC QN AUTO: 31.5 PG (ref 26.5–33)
MCH RBC QN AUTO: 31.6 PG (ref 26.5–33)
MCHC RBC AUTO-ENTMCNC: 33.7 G/DL (ref 31.5–36.5)
MCHC RBC AUTO-ENTMCNC: 33.7 G/DL (ref 31.5–36.5)
MCV RBC AUTO: 94 FL (ref 78–100)
MCV RBC AUTO: 94 FL (ref 78–100)
MONOCYTES # BLD AUTO: 0.3 10E3/UL (ref 0–1.3)
MONOCYTES # BLD AUTO: 0.4 10E3/UL (ref 0–1.3)
MONOCYTES NFR BLD AUTO: 8 %
MONOCYTES NFR BLD AUTO: 8 %
MUCOUS THREADS #/AREA URNS LPF: PRESENT /LPF
MUCOUS THREADS #/AREA URNS LPF: PRESENT /LPF
NEUTROPHILS # BLD AUTO: 2.3 10E3/UL (ref 1.6–8.3)
NEUTROPHILS # BLD AUTO: 3.1 10E3/UL (ref 1.6–8.3)
NEUTROPHILS NFR BLD AUTO: 69 %
NEUTROPHILS NFR BLD AUTO: 71 %
NITRATE UR QL: NEGATIVE
NITRATE UR QL: NEGATIVE
NRBC # BLD AUTO: 0 10E3/UL
NRBC # BLD AUTO: 0 10E3/UL
NRBC BLD AUTO-RTO: 0 /100
NRBC BLD AUTO-RTO: 0 /100
P AXIS - MUSE: 65 DEGREES
PH UR STRIP: 5.5 [PH] (ref 5–9)
PH UR STRIP: 5.5 [PH] (ref 5–9)
PLATELET # BLD AUTO: 144 10E3/UL (ref 150–450)
PLATELET # BLD AUTO: 181 10E3/UL (ref 150–450)
POTASSIUM BLD-SCNC: 3.9 MMOL/L (ref 3.5–5.1)
POTASSIUM BLD-SCNC: 4.2 MMOL/L (ref 3.5–5.1)
PR INTERVAL - MUSE: 156 MS
PROT SERPL-MCNC: 6.2 G/DL (ref 6.4–8.9)
PROT SERPL-MCNC: 6.7 G/DL (ref 6.4–8.9)
QRS DURATION - MUSE: 116 MS
QT - MUSE: 378 MS
QTC - MUSE: 467 MS
R AXIS - MUSE: -60 DEGREES
RBC # BLD AUTO: 3.67 10E6/UL (ref 4.4–5.9)
RBC # BLD AUTO: 4.09 10E6/UL (ref 4.4–5.9)
RBC URINE: 16 /HPF
RBC URINE: 5 /HPF
SODIUM SERPL-SCNC: 137 MMOL/L (ref 134–144)
SODIUM SERPL-SCNC: 140 MMOL/L (ref 134–144)
SP GR UR STRIP: 1.03 (ref 1–1.03)
SP GR UR STRIP: 1.03 (ref 1–1.03)
SYSTOLIC BLOOD PRESSURE - MUSE: NORMAL MMHG
T AXIS - MUSE: 88 DEGREES
UROBILINOGEN UR STRIP-MCNC: NORMAL MG/DL
UROBILINOGEN UR STRIP-MCNC: NORMAL MG/DL
VENTRICULAR RATE- MUSE: 92 BPM
WBC # BLD AUTO: 3.4 10E3/UL (ref 4–11)
WBC # BLD AUTO: 4.3 10E3/UL (ref 4–11)
WBC URINE: 1 /HPF
WBC URINE: 1 /HPF

## 2022-01-01 PROCEDURE — 81001 URINALYSIS AUTO W/SCOPE: CPT | Performed by: EMERGENCY MEDICINE

## 2022-01-01 PROCEDURE — 80053 COMPREHEN METABOLIC PANEL: CPT | Performed by: EMERGENCY MEDICINE

## 2022-01-01 PROCEDURE — 93010 ELECTROCARDIOGRAM REPORT: CPT | Performed by: INTERNAL MEDICINE

## 2022-01-01 PROCEDURE — 250N000011 HC RX IP 250 OP 636: Performed by: EMERGENCY MEDICINE

## 2022-01-01 PROCEDURE — 96360 HYDRATION IV INFUSION INIT: CPT | Performed by: EMERGENCY MEDICINE

## 2022-01-01 PROCEDURE — 83735 ASSAY OF MAGNESIUM: CPT | Performed by: EMERGENCY MEDICINE

## 2022-01-01 PROCEDURE — 85025 COMPLETE CBC W/AUTO DIFF WBC: CPT | Performed by: EMERGENCY MEDICINE

## 2022-01-01 PROCEDURE — 96361 HYDRATE IV INFUSION ADD-ON: CPT | Performed by: EMERGENCY MEDICINE

## 2022-01-01 PROCEDURE — 36415 COLL VENOUS BLD VENIPUNCTURE: CPT | Performed by: EMERGENCY MEDICINE

## 2022-01-01 PROCEDURE — 99285 EMERGENCY DEPT VISIT HI MDM: CPT | Mod: 25 | Performed by: EMERGENCY MEDICINE

## 2022-01-01 PROCEDURE — 99284 EMERGENCY DEPT VISIT MOD MDM: CPT | Mod: 25 | Performed by: EMERGENCY MEDICINE

## 2022-01-01 PROCEDURE — 258N000003 HC RX IP 258 OP 636: Performed by: EMERGENCY MEDICINE

## 2022-01-01 PROCEDURE — 96374 THER/PROPH/DIAG INJ IV PUSH: CPT | Performed by: EMERGENCY MEDICINE

## 2022-01-01 PROCEDURE — 99283 EMERGENCY DEPT VISIT LOW MDM: CPT | Performed by: EMERGENCY MEDICINE

## 2022-01-01 PROCEDURE — 70450 CT HEAD/BRAIN W/O DYE: CPT

## 2022-01-01 PROCEDURE — 93005 ELECTROCARDIOGRAM TRACING: CPT | Performed by: EMERGENCY MEDICINE

## 2022-01-01 RX ORDER — SERTRALINE HYDROCHLORIDE 25 MG/1
25 TABLET, FILM COATED ORAL DAILY
Qty: 45 TABLET | Refills: 3 | COMMUNITY
Start: 2022-01-01

## 2022-01-01 RX ORDER — ONDANSETRON 4 MG/1
4 TABLET, ORALLY DISINTEGRATING ORAL EVERY 8 HOURS PRN
Qty: 10 TABLET | Refills: 0 | Status: SHIPPED | OUTPATIENT
Start: 2022-01-01 | End: 2022-01-01

## 2022-01-01 RX ORDER — QUETIAPINE FUMARATE 25 MG/1
50 TABLET, FILM COATED ORAL 3 TIMES DAILY
Qty: 90 TABLET | Refills: 1 | COMMUNITY
Start: 2022-01-01 | End: 2022-01-01

## 2022-01-01 RX ORDER — ONDANSETRON 2 MG/ML
4 INJECTION INTRAMUSCULAR; INTRAVENOUS EVERY 30 MIN PRN
Status: DISCONTINUED | OUTPATIENT
Start: 2022-01-01 | End: 2022-01-01 | Stop reason: HOSPADM

## 2022-01-01 RX ORDER — SODIUM CHLORIDE 9 MG/ML
INJECTION, SOLUTION INTRAVENOUS CONTINUOUS
Status: DISCONTINUED | OUTPATIENT
Start: 2022-01-01 | End: 2022-01-01 | Stop reason: HOSPADM

## 2022-01-01 RX ORDER — QUETIAPINE FUMARATE 25 MG/1
50 TABLET, FILM COATED ORAL 3 TIMES DAILY
Qty: 90 TABLET | Refills: 1 | COMMUNITY
Start: 2022-01-01

## 2022-01-01 RX ORDER — MESALAMINE 0.38 G/1
0.75 CAPSULE, EXTENDED RELEASE ORAL 2 TIMES DAILY
COMMUNITY
Start: 2022-01-01

## 2022-01-01 RX ORDER — LOSARTAN POTASSIUM 100 MG/1
100 TABLET ORAL DAILY
Qty: 30 TABLET | Refills: 0 | COMMUNITY
Start: 2022-01-01

## 2022-01-01 RX ORDER — SENNOSIDES 8.6 MG
1 TABLET ORAL 2 TIMES DAILY
COMMUNITY
Start: 2022-01-01

## 2022-01-01 RX ORDER — ACETAMINOPHEN 500 MG
500 TABLET ORAL 2 TIMES DAILY
Qty: 30 TABLET | Refills: 0 | Status: SHIPPED | OUTPATIENT
Start: 2022-01-01

## 2022-01-01 RX ORDER — CANAGLIFLOZIN 100 MG/1
TABLET, FILM COATED ORAL
Qty: 30 TABLET | Refills: 8 | Status: SHIPPED | OUTPATIENT
Start: 2022-01-01 | End: 2022-01-01

## 2022-01-01 RX ADMIN — ONDANSETRON 4 MG: 2 INJECTION INTRAMUSCULAR; INTRAVENOUS at 12:46

## 2022-01-01 RX ADMIN — SODIUM CHLORIDE: 9 INJECTION, SOLUTION INTRAVENOUS at 20:07

## 2022-01-01 RX ADMIN — SODIUM CHLORIDE 500 ML: 9 INJECTION, SOLUTION INTRAVENOUS at 12:46

## 2022-01-01 ASSESSMENT — MIFFLIN-ST. JEOR: SCORE: 1261.45

## 2022-01-04 NOTE — TELEPHONE ENCOUNTER
"[facility] called and Vikas is out of Invokana. He has had two recent visits with Jacqui Espino and a visit with Dr Parker in September of 2021. A1C was at or near goal. Will refill per Collaborative Practice Agreement through Sept 2022 to coincide with Dr Parker visit.    Requested Prescriptions   Pending Prescriptions Disp Refills     INVOKANA 100 MG tablet [Pharmacy Med Name: Invokana 100 mg tablet] 30 tablet 8     Sig: Take 1 tablet (100 mg) by mouth every morning (before breakfast)       Sodium Glucose Co-Transport Inhibitor Agents Passed - 1/3/2022 10:32 AM        Passed - Patient has documented A1c within the specified period of time.     If HgbA1C is 8 or greater, it needs to be on file within the past 3 months.  If less than 8, must be on file within the past 6 months.     Recent Labs   Lab Test 09/09/21  1520 11/28/18  1102 11/25/15  1206   A1C 8.0*   < >  --    [id number]  --   --  8.2*    < > = values in this interval not displayed.             Passed - No creatinine >1.4 or GFR <45 within the past 12 mos     Recent Labs   Lab Test 09/09/21  1520 06/29/21  0555   GFRESTIMATED 79 >90   GFRESTBLACK  --  >90       Recent Labs   Lab Test 09/09/21  1520   CR 0.90             Passed - Medication is active on med list        Passed - Patient is age 18 or older        Passed - Patient has documented normal Potassium within the last 12 mos.     Recent Labs   Lab Test 09/09/21  1520   POTASSIUM 5.0             Passed - Recent (6 mo) or future (30 days) visit within the authorizing provider's specialty     Patient had office visit in the last 6 months or has a visit in the next 30 days with authorizing provider or within the authorizing provider's specialty.  See \"Patient Info\" tab in inbasket, or \"Choose Columns\" in Meds & Orders section of the refill encounter.               "

## 2022-01-17 NOTE — ED TRIAGE NOTES
EMS Arrival Note  ________________________________  Miles COOPER Ji is a 81 year old Male [facility]   Pre hospital clinical presentation per EMS personnel includes altered mental status, patient not verbally responding, opens eyes spontaneously. LKW is 1500 today.   Pre hospital personnel report vital signs of:  VR=135.   Pre Hospital Cardiac rhythm reported as Normal Sinus    Patient arrives with:  GCS Total = 9  Airway intact  Breathing Assessment Normal  Circulation Assessment Normal      Placed in room E01, gowned, warm blanket provided, side rails up,  ID verified and band placed, and call light within reach.       Previous living situation Nursing Home

## 2022-01-17 NOTE — ED PROVIDER NOTES
History     Chief Complaint   Patient presents with     Altered Mental Status     HPI  Vikas Ji is a 81 year old male who comes in by ambulance from skilled nursing facility for altered mental status.  Apparently staff there found him almost unresponsive.  He would not open his eyes or respond to them in any way.  He does have dementia but is normally responsive.  His wife is here but she does not have any history as to any of the events of the day.  She has never seen him quite like this.  By the time he arrives here he is starting to wake up.  EMS reports that he opened his eyes to sternal rub.  When I entered the room and start talking he looks directly at me, however does not answer or seem to comprehend anything I am saying initially.  During exam we sat him up in bed and he stayed there on his own.  When I told him he could lie back he did so.  Therefore he does seem to be understanding us somewhat.  No reports of recent illness, trauma    Allergies:  No Known Allergies    Problem List:    Patient Active Problem List    Diagnosis Date Noted     History of radiation therapy 10/20/2021     Priority: Medium     At high risk for elopement 10/20/2021     Priority: Medium     Ulcerative colitis without complications, unspecified location (H) 10/20/2021     Priority: Medium     DNR (do not resuscitate) 10/08/2021     Priority: Medium     Septic encephalopathy 06/28/2021     Priority: Medium     Senile dementia, uncomplicated (H) 06/28/2021     Priority: Medium     Essential hypertension, malignant 06/28/2021     Priority: Medium     Personal history of tobacco use, presenting hazards to health 06/28/2021     Priority: Medium     Encounter for long-term (current) use of aspirin 06/28/2021     Priority: Medium     Hypomagnesemia 06/28/2021     Priority: Medium     Need for prophylactic chemotherapy 06/28/2021     Priority: Medium     Hyperlipidemia, unspecified hyperlipidemia type 06/28/2021     Priority:  Medium     Type 2 diabetes mellitus without complication, unspecified whether long term insulin use (H) 06/28/2021     Priority: Medium     Encounter for screening laboratory testing for COVID-19 virus 06/28/2021     Priority: Medium     Fever, unspecified fever cause 06/26/2021     Priority: Medium     Dehydration 09/24/2020     Priority: Medium     Hyponatremia 09/24/2020     Priority: Medium     Adenocarcinoma of esophagus (H) 07/02/2020     Priority: Medium     Change in bowel habits 03/11/2020     Priority: Medium     Other dysphagia 03/11/2020     Priority: Medium     Difficulty urinating 03/11/2020     Priority: Medium     Chewing tobacco use 12/18/2019     Priority: Medium     Vitamin B12 deficiency 09/17/2019     Priority: Medium     Memory problem 09/17/2019     Priority: Medium     Benign essential hypertension 11/28/2018     Priority: Medium     Mixed hyperlipidemia 11/28/2018     Priority: Medium     Balance problems 10/06/2015     Priority: Medium     Generalized weakness 10/06/2015     Priority: Medium     Controlled type 2 diabetes mellitus without complication, with long-term current use of insulin (H) 09/10/2015     Priority: Medium     History of tobacco use 09/10/2015     Priority: Medium     Low blood magnesium level 09/10/2015     Priority: Medium     Cough 11/28/2012     Priority: Medium     Inguinal hernia, right 04/19/2011     Priority: Medium     Diverticulosis of large intestine 10/18/2010     Priority: Medium     Personal history of other diseases of digestive system 10/18/2010     Priority: Medium        Past Medical History:    Past Medical History:   Diagnosis Date     Dementia (H)      HLD (hyperlipidemia)      HTN (hypertension)      Hypomagnesemia      Other abnormalities of gait and mobility      Type 2 diabetes mellitus with hyperglycemia (H)        Past Surgical History:    Past Surgical History:   Procedure Laterality Date     COLONOSCOPY  09/27/2010 2010,F/U 2020  Ulcerative  colitis     COLONOSCOPY N/A 2020    Procedure: COLONOSCOPY, WITH POLYPECTOMY AND BIOPSY;  Surgeon: Eliseo Arshad MD;  Location:  OR     ESOPHAGOSCOPY, GASTROSCOPY, DUODENOSCOPY (EGD), COMBINED N/A 2020    Procedure: ESOPHAGOGASTRODUODENOSCOPY, WITH BIOPSY;  Surgeon: Eliseo Arshad MD;  Location:  OR       Family History:    Family History   Problem Relation Age of Onset     No Known Problems Mother      No Known Problems Father      Throat cancer Maternal Grandmother        Social History:  Marital Status:   [2]  Social History     Tobacco Use     Smoking status: Former Smoker     Packs/day: 0.60     Years: 30.00     Pack years: 18.00     Types: Cigarettes     Quit date: 1987     Years since quittin.0     Smokeless tobacco: Current User     Types: Chew   Vaping Use     Vaping Use: Never used   Substance Use Topics     Alcohol use: Yes     Alcohol/week: 10.0 standard drinks     Types: 10 Cans of beer per week     Comment: 0-2 beer per week     Drug use: No        Medications:    alcohol swab prep pads  aspirin 81 MG chewable tablet  blood glucose (NO BRAND SPECIFIED) test strip  blood glucose calibration (NO BRAND SPECIFIED) solution  blood glucose monitoring (NO BRAND SPECIFIED) meter device kit  glipiZIDE (GLUCOTROL) 10 MG tablet  INVOKANA 100 MG tablet  losartan (COZAAR) 100 MG tablet  mesalamine (APRISO ER) 0.375 g 24 hr capsule  metFORMIN (GLUCOPHAGE) 1000 MG tablet  Nutritional Supplements (BOOST HIGH PROTEIN) LIQD  omeprazole (PRILOSEC) 20 MG DR capsule  QUEtiapine (SEROQUEL) 25 MG tablet  sennosides (SENOKOT) 8.6 MG tablet  sertraline (ZOLOFT) 25 MG tablet  thin (NO BRAND SPECIFIED) lancets          Review of Systems   Unable to perform ROS: Dementia       Physical Exam   BP: 122/63  Pulse: 92  Temp: 97.6  F (36.4  C)  Resp: 22  Weight: 61.1 kg (134 lb 11.2 oz)  SpO2: 100 %      Physical Exam  Vitals and nursing note reviewed.   Constitutional:        Appearance: He is well-developed.   HENT:      Head: Normocephalic and atraumatic.      Mouth/Throat:      Mouth: Mucous membranes are moist.   Eyes:      Conjunctiva/sclera: Conjunctivae normal.   Cardiovascular:      Rate and Rhythm: Normal rate and regular rhythm.      Heart sounds: Normal heart sounds.   Pulmonary:      Effort: Pulmonary effort is normal.      Breath sounds: Normal breath sounds.   Abdominal:      General: Abdomen is flat. Bowel sounds are normal.   Neurological:      Mental Status: He is alert.      GCS: GCS eye subscore is 4. GCS verbal subscore is 1. GCS motor subscore is 6.      Cranial Nerves: No facial asymmetry.      Motor: No weakness or pronator drift.      Comments: We will hold patient's arms up in front of them he will hold their and there is no pronator drift.  He does have equal grasp.  Only hold the legs up he also will hold them there as well.  Otherwise he does not comply with neuro exam.         ED Course                 Procedures         EKG shows normal sinus rhythm at 92 bpm.  There is left anterior fascicular block.  Appears to have some J-point elevation in V2 V3, however this is seen on previous EKGs.  No acute changes    Results for orders placed or performed during the hospital encounter of 01/16/22 (from the past 24 hour(s))   UA with Microscopic reflex to Culture    Specimen: Urine, Plascencia Catheter   Result Value Ref Range    Color Urine Light Yellow Colorless, Straw, Light Yellow, Yellow    Appearance Urine Clear Clear    Glucose Urine >1000 (A) Negative mg/dL    Bilirubin Urine Negative Negative    Ketones Urine Trace (A) Negative mg/dL    Specific Gravity Urine 1.034 (H) 1.000 - 1.030    Blood Urine Trace (A) Negative    pH Urine 5.5 5.0 - 9.0    Protein Albumin Urine Negative Negative mg/dL    Urobilinogen Urine Normal Normal, 2.0 mg/dL    Nitrite Urine Negative Negative    Leukocyte Esterase Urine Negative Negative    Mucus Urine Present (A) None Seen /LPF    RBC  Urine 16 (H) <=2 /HPF    WBC Urine 1 <=5 /HPF    Hyaline Casts Urine 3 (H) <=2 /LPF    Narrative    Urine Culture not indicated   CT Head w/o Contrast    Narrative    Exam: CT HEAD W/O CONTRAST      Exam reason: Mental status change, unknown cause    Technique:   -Axial images of the head obtained without contrast. Coronal and  sagittal reformations were generated.    Comparison: MRI of the brain on 6/28/2021, 6/26/2021       Findings:      Parenchyma: No evidence of intraparenchymal hemorrhage, mass, acute  cortical infarct or prior infarct in a major vascular territory.      There is mild diffuse cerebral volume loss. Patchy periventricular and  deep white matter hypoattenuation is nonspecific but likely due to  chronic microvascular ischemic change. There is mineralization of the  basal ganglia bilaterally.    No midline shift. The basilar cisterns are patent.    Extra-axial spaces: No extra-axial fluid collection or hemorrhage.     Ventricles: Normal.  Paranasal sinuses: Clear.   Mastoid air cells: Clear.    Osseous: No acute findings.  Orbits: Normal.    Soft tissues: Unremarkable.       Impression    Impression:  No acute intracranial abnormality.      JAY JAY LEVY MD         SYSTEM ID:  [id number]   CBC with platelets differential    Narrative    The following orders were created for panel order CBC with platelets differential.  Procedure                               Abnormality         Status                     ---------                               -----------         ------                     CBC with platelets and d...[[id number]]  Abnormal            Final result                 Please view results for these tests on the individual orders.   Comprehensive metabolic panel   Result Value Ref Range    Sodium 137 134 - 144 mmol/L    Potassium 4.2 3.5 - 5.1 mmol/L    Chloride 101 98 - 107 mmol/L    Carbon Dioxide (CO2) 23 21 - 31 mmol/L    Anion Gap 13 3 - 14 mmol/L    Urea Nitrogen 15 7 - 25 mg/dL     Creatinine 0.93 0.70 - 1.30 mg/dL    Calcium 10.1 8.6 - 10.3 mg/dL    Glucose 174 (H) 70 - 105 mg/dL    Alkaline Phosphatase 40 34 - 104 U/L    AST 11 (L) 13 - 39 U/L    ALT 5 (L) 7 - 52 U/L    Protein Total 6.7 6.4 - 8.9 g/dL    Albumin 4.6 3.5 - 5.7 g/dL    Bilirubin Total 0.3 0.3 - 1.0 mg/dL    GFR Estimate 82 >60 mL/min/1.73m2   Magnesium   Result Value Ref Range    Magnesium 1.8 (L) 1.9 - 2.7 mg/dL   CBC with platelets and differential   Result Value Ref Range    WBC Count 4.3 4.0 - 11.0 10e3/uL    RBC Count 4.09 (L) 4.40 - 5.90 10e6/uL    Hemoglobin 12.9 (L) 13.3 - 17.7 g/dL    Hematocrit 38.3 (L) 40.0 - 53.0 %    MCV 94 78 - 100 fL    MCH 31.5 26.5 - 33.0 pg    MCHC 33.7 31.5 - 36.5 g/dL    RDW 13.1 10.0 - 15.0 %    Platelet Count 181 150 - 450 10e3/uL    % Neutrophils 71 %    % Lymphocytes 17 %    % Monocytes 8 %    % Eosinophils 3 %    % Basophils 1 %    % Immature Granulocytes 0 %    NRBCs per 100 WBC 0 <1 /100    Absolute Neutrophils 3.1 1.6 - 8.3 10e3/uL    Absolute Lymphocytes 0.7 (L) 0.8 - 5.3 10e3/uL    Absolute Monocytes 0.4 0.0 - 1.3 10e3/uL    Absolute Eosinophils 0.1 0.0 - 0.7 10e3/uL    Absolute Basophils 0.0 0.0 - 0.2 10e3/uL    Absolute Immature Granulocytes 0.0 <=0.4 10e3/uL    Absolute NRBCs 0.0 10e3/uL       Medications   sodium chloride 0.9% infusion ( Intravenous New Bag 1/16/22 2007)       Assessments & Plan (with Medical Decision Making)     I have reviewed the nursing notes.    I have reviewed the findings, diagnosis, plan and need for follow up with the patient.  By the time all of his studies returned, he has woken up and his daughter is here with him.  She states that he is back to baseline at this time.  It is unclear exactly what caused this transient altered mental status.  TIA is certainly a possibility.  She does not want any further evaluation if that would be the case.  She did confirm that he is taking an ARB.  She is his decision maker.  He is comfortable and they would like  to be discharged back to his place of living.    New Prescriptions    No medications on file       Final diagnoses:   Transient alteration of awareness       1/16/2022   [facility] AND [facility]     Jeferson Mcgrath MD  01/16/22 2057

## 2022-02-06 NOTE — ED PROVIDER NOTES
History     Chief Complaint   Patient presents with     Nausea & Vomiting     HPI  Vikas Ji is a 82 year old male who comes in with his daughter who reports that the staff at his skilled nursing facility told her that he has not been able to keep anything down for the last 3 days.  Has been having nausea vomiting.  No history of diarrhea.  She feels like he has lost a lot of weight recently.  He has dementia and says he feels fine.  No other complaints    Allergies:  No Known Allergies    Problem List:    Patient Active Problem List    Diagnosis Date Noted     History of radiation therapy 10/20/2021     Priority: Medium     At high risk for elopement 10/20/2021     Priority: Medium     Ulcerative colitis without complications, unspecified location (H) 10/20/2021     Priority: Medium     DNR (do not resuscitate) 10/08/2021     Priority: Medium     Septic encephalopathy 06/28/2021     Priority: Medium     Senile dementia, uncomplicated (H) 06/28/2021     Priority: Medium     Essential hypertension, malignant 06/28/2021     Priority: Medium     Personal history of tobacco use, presenting hazards to health 06/28/2021     Priority: Medium     Encounter for long-term (current) use of aspirin 06/28/2021     Priority: Medium     Hypomagnesemia 06/28/2021     Priority: Medium     Need for prophylactic chemotherapy 06/28/2021     Priority: Medium     Hyperlipidemia, unspecified hyperlipidemia type 06/28/2021     Priority: Medium     Type 2 diabetes mellitus without complication, unspecified whether long term insulin use (H) 06/28/2021     Priority: Medium     Encounter for screening laboratory testing for COVID-19 virus 06/28/2021     Priority: Medium     Fever, unspecified fever cause 06/26/2021     Priority: Medium     Dehydration 09/24/2020     Priority: Medium     Hyponatremia 09/24/2020     Priority: Medium     Adenocarcinoma of esophagus (H) 07/02/2020     Priority: Medium     Change in bowel habits 03/11/2020      Priority: Medium     Other dysphagia 03/11/2020     Priority: Medium     Difficulty urinating 03/11/2020     Priority: Medium     Chewing tobacco use 12/18/2019     Priority: Medium     Vitamin B12 deficiency 09/17/2019     Priority: Medium     Memory problem 09/17/2019     Priority: Medium     Benign essential hypertension 11/28/2018     Priority: Medium     Mixed hyperlipidemia 11/28/2018     Priority: Medium     Balance problems 10/06/2015     Priority: Medium     Generalized weakness 10/06/2015     Priority: Medium     Controlled type 2 diabetes mellitus without complication, with long-term current use of insulin (H) 09/10/2015     Priority: Medium     History of tobacco use 09/10/2015     Priority: Medium     Low blood magnesium level 09/10/2015     Priority: Medium     Cough 11/28/2012     Priority: Medium     Inguinal hernia, right 04/19/2011     Priority: Medium     Diverticulosis of large intestine 10/18/2010     Priority: Medium     Personal history of other diseases of digestive system 10/18/2010     Priority: Medium        Past Medical History:    Past Medical History:   Diagnosis Date     Dementia (H)      HLD (hyperlipidemia)      HTN (hypertension)      Hypomagnesemia      Other abnormalities of gait and mobility      Type 2 diabetes mellitus with hyperglycemia (H)        Past Surgical History:    Past Surgical History:   Procedure Laterality Date     COLONOSCOPY  09/27/2010 2010,F/U 2020  Ulcerative colitis     COLONOSCOPY N/A 6/30/2020    Procedure: COLONOSCOPY, WITH POLYPECTOMY AND BIOPSY;  Surgeon: Eliseo Arshad MD;  Location:  OR     ESOPHAGOSCOPY, GASTROSCOPY, DUODENOSCOPY (EGD), COMBINED N/A 6/30/2020    Procedure: ESOPHAGOGASTRODUODENOSCOPY, WITH BIOPSY;  Surgeon: Eliseo Arshad MD;  Location:  OR       Family History:    Family History   Problem Relation Age of Onset     No Known Problems Mother      No Known Problems Father      Throat cancer Maternal  "Grandmother        Social History:  Marital Status:   [2]  Social History     Tobacco Use     Smoking status: Former Smoker     Packs/day: 0.60     Years: 30.00     Pack years: 18.00     Types: Cigarettes     Quit date: 1987     Years since quittin.1     Smokeless tobacco: Current User     Types: Chew   Vaping Use     Vaping Use: Never used   Substance Use Topics     Alcohol use: Yes     Alcohol/week: 10.0 standard drinks     Types: 10 Cans of beer per week     Comment: 0-2 beer per week     Drug use: No        Medications:    ondansetron (ZOFRAN ODT) 4 MG ODT tab  alcohol swab prep pads  aspirin 81 MG chewable tablet  blood glucose (NO BRAND SPECIFIED) test strip  blood glucose calibration (NO BRAND SPECIFIED) solution  blood glucose monitoring (NO BRAND SPECIFIED) meter device kit  glipiZIDE (GLUCOTROL) 10 MG tablet  INVOKANA 100 MG tablet  losartan (COZAAR) 100 MG tablet  mesalamine (APRISO ER) 0.375 g 24 hr capsule  metFORMIN (GLUCOPHAGE) 1000 MG tablet  Nutritional Supplements (BOOST HIGH PROTEIN) LIQD  omeprazole (PRILOSEC) 20 MG DR capsule  QUEtiapine (SEROQUEL) 25 MG tablet  sennosides (SENOKOT) 8.6 MG tablet  sertraline (ZOLOFT) 25 MG tablet  thin (NO BRAND SPECIFIED) lancets          Review of Systems   Unable to perform ROS: Dementia       Physical Exam   BP: 120/48  Pulse: 95  Temp: (!) 96.1  F (35.6  C)  Resp: 16  Height: 170.2 cm (5' 7\")  Weight: 60.8 kg (134 lb)  SpO2: 99 %      Physical Exam  Vitals and nursing note reviewed.   Constitutional:       Appearance: Normal appearance.   HENT:      Head: Normocephalic and atraumatic.      Mouth/Throat:      Mouth: Mucous membranes are moist.   Eyes:      Conjunctiva/sclera: Conjunctivae normal.   Cardiovascular:      Rate and Rhythm: Normal rate and regular rhythm.      Heart sounds: Normal heart sounds.   Pulmonary:      Effort: Pulmonary effort is normal.      Breath sounds: Normal breath sounds.   Abdominal:      General: Abdomen is " flat. Bowel sounds are normal.   Skin:     General: Skin is warm and dry.   Neurological:      Mental Status: He is alert. Mental status is at baseline.   Psychiatric:         Mood and Affect: Mood normal.         Behavior: Behavior normal.         ED Course                 Procedures                Results for orders placed or performed during the hospital encounter of 02/06/22 (from the past 24 hour(s))   CBC with platelets differential    Narrative    The following orders were created for panel order CBC with platelets differential.  Procedure                               Abnormality         Status                     ---------                               -----------         ------                     CBC with platelets and d...[[id number]]  Abnormal            Final result                 Please view results for these tests on the individual orders.   Comprehensive metabolic panel   Result Value Ref Range    Sodium 140 134 - 144 mmol/L    Potassium 3.9 3.5 - 5.1 mmol/L    Chloride 104 98 - 107 mmol/L    Carbon Dioxide (CO2) 24 21 - 31 mmol/L    Anion Gap 12 3 - 14 mmol/L    Urea Nitrogen 25 7 - 25 mg/dL    Creatinine 0.89 0.70 - 1.30 mg/dL    Calcium 10.0 8.6 - 10.3 mg/dL    Glucose 163 (H) 70 - 105 mg/dL    Alkaline Phosphatase 32 (L) 34 - 104 U/L    AST 11 (L) 13 - 39 U/L    ALT 5 (L) 7 - 52 U/L    Protein Total 6.2 (L) 6.4 - 8.9 g/dL    Albumin 4.5 3.5 - 5.7 g/dL    Bilirubin Total 0.7 0.3 - 1.0 mg/dL    GFR Estimate 85 >60 mL/min/1.73m2   CBC with platelets and differential   Result Value Ref Range    WBC Count 3.4 (L) 4.0 - 11.0 10e3/uL    RBC Count 3.67 (L) 4.40 - 5.90 10e6/uL    Hemoglobin 11.6 (L) 13.3 - 17.7 g/dL    Hematocrit 34.4 (L) 40.0 - 53.0 %    MCV 94 78 - 100 fL    MCH 31.6 26.5 - 33.0 pg    MCHC 33.7 31.5 - 36.5 g/dL    RDW 12.8 10.0 - 15.0 %    Platelet Count 144 (L) 150 - 450 10e3/uL    % Neutrophils 69 %    % Lymphocytes 18 %    % Monocytes 8 %    % Eosinophils 4 %    % Basophils 1 %    %  Immature Granulocytes 0 %    NRBCs per 100 WBC 0 <1 /100    Absolute Neutrophils 2.3 1.6 - 8.3 10e3/uL    Absolute Lymphocytes 0.6 (L) 0.8 - 5.3 10e3/uL    Absolute Monocytes 0.3 0.0 - 1.3 10e3/uL    Absolute Eosinophils 0.1 0.0 - 0.7 10e3/uL    Absolute Basophils 0.0 0.0 - 0.2 10e3/uL    Absolute Immature Granulocytes 0.0 <=0.4 10e3/uL    Absolute NRBCs 0.0 10e3/uL   UA with Microscopic reflex to Culture    Specimen: Urinary Bladder; Urine   Result Value Ref Range    Color Urine Yellow Colorless, Straw, Light Yellow, Yellow    Appearance Urine Clear Clear    Glucose Urine >1000 (A) Negative mg/dL    Bilirubin Urine Negative Negative    Ketones Urine 10  (A) Negative mg/dL    Specific Gravity Urine 1.033 (H) 1.000 - 1.030    Blood Urine Trace (A) Negative    pH Urine 5.5 5.0 - 9.0    Protein Albumin Urine Negative Negative mg/dL    Urobilinogen Urine Normal Normal, 2.0 mg/dL    Nitrite Urine Negative Negative    Leukocyte Esterase Urine Negative Negative    Mucus Urine Present (A) None Seen /LPF    RBC Urine 5 (H) <=2 /HPF    WBC Urine 1 <=5 /HPF    Narrative    Urine Culture not indicated       Medications   0.9% sodium chloride BOLUS (0 mLs Intravenous Stopped 2/6/22 1348)     Followed by   sodium chloride 0.9% infusion (has no administration in time range)   ondansetron (ZOFRAN) injection 4 mg (4 mg Intravenous Given 2/6/22 1246)       Assessments & Plan (with Medical Decision Making)     I have reviewed the nursing notes.    I have reviewed the findings, diagnosis, plan and need for follow up with the patient.  Feeling better with above interventions.  He is able to eat and drink something and has kept it down.  He says he is feeling fine.  No sign of bladder infection.  Blood work is unremarkable.  Will discharge back to place of living with prescription for some Zofran.  Return if worse.    New Prescriptions    ONDANSETRON (ZOFRAN ODT) 4 MG ODT TAB    Take 1 tablet (4 mg) by mouth every 8 hours as needed for  nausea       Final diagnoses:   Gastroenteritis       2/6/2022   [facility] AND [facility]     Jeferson Mcgrath MD  02/06/22 2613

## 2022-02-06 NOTE — ED TRIAGE NOTES
"Pt here with daughter from [facility] care mandy montelongo, pt reportedly has been vomiting x 3 days, no current distress, pt is confused, VSS, pt into [facility] via [facility]  ED Nursing Triage Note (General)   ________________________________    Miles COOPER Ji is a 82 year old Male that presents to triage private car  With history of  Nausea and vomiting reported by patient   Significant symptoms had onset 3 day(s) ago.  /48   Pulse 95   Temp (!) 96.1  F (35.6  C) (Tympanic)   Resp 16   Ht 1.702 m (5' 7\")   Wt 60.8 kg (134 lb)   SpO2 99%   BMI 20.99 kg/m  t  Patient appears alert  and oriented, in no acute distress., and cooperative and pleasant behavior.    GCS Total = 15  Airway: intact  Breathing noted as Normal  Circulation Normal  Skin:  Normal  Action taken:  Triage to critical care immediately      [facility] mandy montelongo    "

## 2022-03-26 NOTE — TELEPHONE ENCOUNTER
"Call From Bri LPN [facility]--asking for crush order--resident does not like swallowing pills.    Has had 20 pound weight loss over past year--Nurse reports glucose levels \"30s\" and most under 70    Hospice has been discussed--daughter agreeable--family members unable to agree  Nirmala wants her father to be comfortable--I called her and  We discussed stopping Diabetic medications and others not beneficial and she is agreeable and holding some others unclear if needed until I see him.  Nirmala would like tylenol 500 BID given crushed for pain    Will hold other meds besides seroquel--Nirmala feels he needs this for agitation.  Will reduce senna to 1 tab BID with PRN hold--[facility] staff uncertain of bowel habits but reports incontinence   And loose stools history.    Recommend Nirmala call Hospice for informational family visit.    I will followup on him next week at rounds    Jacqui Espino, APRN CNP   March 26, 2022     "

## 2022-03-29 NOTE — TELEPHONE ENCOUNTER
I agree with this plan.     Electronically signed by:  Ace Salter MD  on March 29, 2022 8:16 AM

## 2022-03-30 NOTE — TELEPHONE ENCOUNTER
Dr Salter    Family care conference today--Nirmala is agreeable with Hospice services    Please review and sign    Family has chosen [facility]  Chart review with Christine Littlejohn RN Admission director this morning who is proceeding with admission to medical director     Thanks  Jacqui Espino, APRN CNP   March 30, 2022

## 2022-03-30 NOTE — PROGRESS NOTES
Vikas Ji is a 82 year old male being seen today for comprehensive and follow up visit visit at [facility].    Code Status: DNR / DNI, Advance Directives: YES-   Health Care Power of : Extended Emergency Contact Information  Primary Emergency Contact: Goodell, Brenda  Home Phone: [phone]  Work Phone: [phone]  Mobile Phone: [phone]  Relation: Daughter  Secondary Emergency Contact: Gail Ji   [place]  Mobile Phone: [phone]  Relation: Spouse     Allergies: Patient has no known allergies.     Chief Complaint / HPI: Follow-up on resident who resides in [facility] assisted living facility on a call I received over the weekend requesting crushing medications as staff reported it was difficult for him to swallow multiple medications.  After more inquiry discovered that resident had been declining for months since admission to facility in October 2021, and most recently was isolating in room, not interested in eating and has had a weight loss of at least 20 pounds--- chart review back to 2019 has lost 40 pounds.  Staff had also reported glucose levels were as low as 30--- resident was on 3 oral antidiabetic medications, blood pressure medications, low-dose aspirin, losartan, mesalamine.  I have contacted his daughter Nirmala BARKSDALE to discuss discontinuing diabetic medications, aspirin and sertraline, and placing on hold blood pressure medication and mesalamine and follow-up today during rounds  Nirmala was very agreeable with the plan, she reported that she has felt that her father is nearing last stages of life and has considered hospice comfort care as family wants comfort and quality of life measures only.  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  Family had decided they did not want to pursue chemotherapy or surgery as the  risks outweigh the benefits but did pursue a trial of radiation therapy.  In reviewing oncology and radiation oncology office notes consistent comments regarding nausea with dysphagia and weight loss.  Resident's dementia had progressed to the point where he could not be managed at home safely any longer as he was wandering and was admitted to [facility] memory unit assisted living October 2021.  Resident has had episodes of severe agitation, aggressive behaviors, urinating in room and other residents rooms, wandering and was started on low-dose Seroquel and recently in January Seroquel dose was doubled to current dose at 50 mg 3 times daily which staff feels has been beneficial in moderating agitation, aggression, resistance to cares and self distressing behaviors and has been redirectable and consolable.    Nirmala who is the daughter and APOLONIA and her sister Natalie had requested to have a care conference meeting today with the nursing staff and requested that I attend.  Nirmala realizes that her father is declining rapidly and would like to pursue hospice comfort cares as they are not interested in any hospitalizations, treatments or invasive procedures they would like to focus on quality of life measures only.  Nirmala has purchased boost nutritional supplement for the staff to administer 3 times a day as she realizes her father's interest in food has declined but she would like to make sure that the quality of his intake is optimal.     I did review glucose levels with the staff off of his medications and they have remained low 100s-130s.  Blood pressure has been 90-110s.  My plan was to further discontinue blood pressure medication and minimize pill burden--however staff would like to wait for hospice admission for final medication orders--- currently blood pressure medication is on hold.    Resident is pleasantly confused and is unable to provide any meaningful history due to advanced dementia, visibly apparent weight  loss emaciated appearance.    Past Medical, Surgical, Family and Social History reviewed: YES.     Medications: reviewed and reconciled  Medications - recent changes: Diabetes oral medications discontinued, low-dose aspirin discontinued, mesalamine on hold per daughter request  Sertraline on hold see MAR.  Blood pressure medication on hold and bowel medication senna dosage reduced from 2 tabs twice daily to 1 tab twice daily until it is clear that resident does not need bowel maintenance medication.   Tylenol 500 mg po crushed BID started 3/26 per daughter request    Review of Systems:  General: positive for fatigue, weakness  Resp: positive for dyspnea  GI: positive for incontinent  : positive for incontinence  Musculoskeletal: positive for muscular weakness  Neurologic: positive for memory problems and behavior changes  Psychiatric: positive for anxiety and agitation  Endocrine: positive for diabetes  All other systems negative  Toileting:    Continent of Bowel: No   Continent of Bladder: No  Mobility: walker--often forgets to use--very unsteady at baseline    Recent Labs: reviewed previous ED labs 2/2022    Pertinent Screening Tool results: Karnofsky scale approximately 40-50, E prognosis.ucsf scale  60 % mortality in 6 months    Current Therapies: none    Exam:  Vital signs reviewed.   GENERAL APPEARANCE: alert and no acute distress  EYES: Eyes grossly normal to inspection, conjunctivae and sclerae normal  RESP: mild dyspnea--baseline   MS: no musculoskeletal defects are noted and gait is unsteady  SKIN: cool and dry, finger tips cyanotic appearing  NEURO:  mentation drowsy, easily arousable confused at baseline, and speech tangential  PSYCH: Drowsy, confused, easily agitated    Assessment and Plan:      Resident with advanced dementia, full incontinence dependent on staff for most ADLs requiring supervision for wandering, weight loss  Mentation generally drowsy but arousable--staff feel is sleeping 16 to 20  hours a day approximately, lost interest in food and self isolating in apartment unless he is wandering in other residents rooms.  Significant history of esophageal adenocarcinoma with difficulty swallowing pills and declining meals and snacks including fluids and mechanical soft.  Dementia advanced with episodes of agitation, aggression currently redirectable to staff on current dose of Seroquel  Daughter has been adamant she does not want Seroquel medication changed.     Goal of care conference with family and staff today at the request of family who are ready for hospice care.  Daughter Nirmala BARKSDALE would like a call from hospice admission director today if possible.  Daughter has chosen [facility] preference for services.    Telephone call to Christine Littlejohn RN [facility] for referral, chart review to discuss condition and eligibility criteria who will follow up with daughter after eligibility verification with medical director.    We will continue current medication as discussed above and continue to hold other medications until hospice evaluation and admission.    Staff will continue to offer supportive comfort care and encourage intake as tolerated as family requests.    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

## 2023-11-25 NOTE — PROGRESS NOTES
Daughter, Nirmala who is an RN offered to stay with patient and stated the sitter isn't necessary when she's present. She will help him as needed and let us know when she leaves or if she needs help. Charge notified. Will continue to monitor.     Stevie Mantilla RN June 28, 2021 8:35 AM             Acute asthma exacerbation

## 2024-02-26 NOTE — TELEPHONE ENCOUNTER
Screening Questions for the Scheduling of Screening Colonoscopies   (If Colonoscopy is diagnostic, Provider should review the chart before scheduling.)  Are you younger than 50 or older than 80?  yes  Do you take aspirin or fish oil? Yes, aspirin (if yes, tell patient to stop 1 week prior to Colonoscopy)  Do you take warfarin (Coumadin), clopidogrel (Plavix), apixaban (Eliquis), dabigatram (Pradaxa), rivaroxaban (Xarelto) or any blood thinner? no  Do you use oxygen at home?  no  Do you have kidney disease? no  Are you on dialysis? no  Have you had a stroke or heart attack in the last year? no  Have you had a stent in your heart or any blood vessel in the last year? no  Have you had a transplant of any organ? no  Have you had a colonoscopy or upper endoscopy (EGD) before? yes         When?  10 years ago?  Date of scheduled Colonoscopy. 3/23/20  Provider james  Pharmacy [facility]     Opt out

## (undated) DEVICE — ENDO BRUSH CHANNEL MASTER CLEANING 2-4.2MM BW-412T

## (undated) DEVICE — ENDO SNARE POLYPECTOMY OVAL 10MM LOOP SD-240U-10

## (undated) DEVICE — SUCTION MANIFOLD NEPTUNE 2 SYS 4 PORT 0702-020-000

## (undated) DEVICE — Device

## (undated) DEVICE — ENDO TRAP POLYP E-TRAP 00711099

## (undated) DEVICE — ESU GROUND PAD ADULT W/CORD E7507

## (undated) DEVICE — TUBING SUCTION 10'X3/16" N510

## (undated) DEVICE — SOL WATER 1500ML

## (undated) DEVICE — ENDO FORCEP ENDOJAW BIOPSY 2.8MMX230CM FB-220U

## (undated) DEVICE — ENDO BITE BLOCK 60 MAXI LF 00712804

## (undated) DEVICE — ENDO KIT COMPLIANCE DYKENDOCMPLY

## (undated) DEVICE — SYR 50ML LL W/O NDL 309653

## (undated) RX ORDER — ACETAMINOPHEN 500 MG
TABLET ORAL
Status: DISPENSED
Start: 2021-01-01

## (undated) RX ORDER — SODIUM CHLORIDE 9 MG/ML
INJECTION, SOLUTION INTRAVENOUS
Status: DISPENSED
Start: 2020-05-07

## (undated) RX ORDER — DEXTROSE MONOHYDRATE 25 G/50ML
INJECTION, SOLUTION INTRAVENOUS
Status: DISPENSED
Start: 2020-06-16

## (undated) RX ORDER — SODIUM CHLORIDE 9 MG/ML
INJECTION, SOLUTION INTRAVENOUS
Status: DISPENSED
Start: 2021-01-01

## (undated) RX ORDER — CEFTRIAXONE SODIUM 1 G/50ML
INJECTION, SOLUTION INTRAVENOUS
Status: DISPENSED
Start: 2020-05-07

## (undated) RX ORDER — CYANOCOBALAMIN 1000 UG/ML
INJECTION, SOLUTION INTRAMUSCULAR; SUBCUTANEOUS
Status: DISPENSED
Start: 2019-09-18

## (undated) RX ORDER — DOXYCYCLINE 100 MG/10ML
INJECTION, POWDER, LYOPHILIZED, FOR SOLUTION INTRAVENOUS
Status: DISPENSED
Start: 2021-01-01

## (undated) RX ORDER — SODIUM CHLORIDE 9 MG/ML
INJECTION, SOLUTION INTRAVENOUS
Status: DISPENSED
Start: 2018-08-19

## (undated) RX ORDER — ONDANSETRON 2 MG/ML
INJECTION INTRAMUSCULAR; INTRAVENOUS
Status: DISPENSED
Start: 2020-05-07

## (undated) RX ORDER — PROPOFOL 10 MG/ML
INJECTION, EMULSION INTRAVENOUS
Status: DISPENSED
Start: 2020-06-30

## (undated) RX ORDER — SODIUM CHLORIDE 9 MG/ML
INJECTION, SOLUTION INTRAVENOUS
Status: DISPENSED
Start: 2020-06-16